# Patient Record
Sex: FEMALE | Race: WHITE | Employment: UNEMPLOYED | URBAN - METROPOLITAN AREA
[De-identification: names, ages, dates, MRNs, and addresses within clinical notes are randomized per-mention and may not be internally consistent; named-entity substitution may affect disease eponyms.]

---

## 2019-03-28 ENCOUNTER — HOSPITAL ENCOUNTER (EMERGENCY)
Age: 29
Discharge: HOME | End: 2019-03-28
Attending: EMERGENCY MEDICINE
Payer: SELF-PAY

## 2019-03-28 ENCOUNTER — APPOINTMENT (OUTPATIENT)
Dept: CT IMAGING | Age: 29
End: 2019-03-28
Attending: NURSE PRACTITIONER
Payer: SELF-PAY

## 2019-03-28 VITALS
HEART RATE: 100 BPM | RESPIRATION RATE: 19 BRPM | WEIGHT: 138 LBS | OXYGEN SATURATION: 97 % | HEIGHT: 59 IN | DIASTOLIC BLOOD PRESSURE: 72 MMHG | TEMPERATURE: 97.2 F | SYSTOLIC BLOOD PRESSURE: 122 MMHG | BODY MASS INDEX: 27.82 KG/M2

## 2019-03-28 DIAGNOSIS — N12 PYELONEPHRITIS: Primary | ICD-10-CM

## 2019-03-28 LAB
ALBUMIN SERPL-MCNC: 3.6 G/DL (ref 3.5–5)
ALBUMIN/GLOB SERPL: 0.8 {RATIO}
ALP SERPL-CCNC: 45 U/L (ref 50–130)
ALT SERPL-CCNC: 27 U/L (ref 12–65)
ANION GAP SERPL CALC-SCNC: 12 MMOL/L
AST SERPL-CCNC: 24 U/L (ref 15–37)
BACTERIA URNS QL MICRO: ABNORMAL /HPF
BASOPHILS # BLD: 0.1 K/UL (ref 0–0.2)
BASOPHILS NFR BLD: 0 % (ref 0–2)
BILIRUB SERPL-MCNC: 0.3 MG/DL (ref 0.2–1.1)
BUN SERPL-MCNC: 15 MG/DL (ref 6–23)
CALCIUM SERPL-MCNC: 9.1 MG/DL (ref 8.3–10.4)
CASTS URNS QL MICRO: ABNORMAL /LPF
CHLORIDE SERPL-SCNC: 102 MMOL/L (ref 98–107)
CO2 SERPL-SCNC: 24 MMOL/L (ref 21–32)
CREAT SERPL-MCNC: 0.72 MG/DL (ref 0.6–1)
DIFFERENTIAL METHOD BLD: ABNORMAL
EOSINOPHIL # BLD: 0.1 K/UL (ref 0–0.8)
EOSINOPHIL NFR BLD: 0 % (ref 0.5–7.8)
EPI CELLS #/AREA URNS HPF: ABNORMAL /HPF
ERYTHROCYTE [DISTWIDTH] IN BLOOD BY AUTOMATED COUNT: 13.6 % (ref 11.9–14.6)
GLOBULIN SER CALC-MCNC: 4.7 G/DL (ref 2.3–3.5)
GLUCOSE SERPL-MCNC: 57 MG/DL (ref 65–100)
HCG UR QL: NEGATIVE
HCT VFR BLD AUTO: 42.3 % (ref 35.8–46.3)
HGB BLD-MCNC: 14 G/DL (ref 11.7–15.4)
IMM GRANULOCYTES # BLD AUTO: 0.1 K/UL (ref 0–0.5)
IMM GRANULOCYTES NFR BLD AUTO: 1 % (ref 0–5)
LACTATE BLD-SCNC: 1.34 MMOL/L (ref 0.5–1.9)
LACTATE BLD-SCNC: 2.18 MMOL/L (ref 0.5–1.9)
LYMPHOCYTES # BLD: 1.6 K/UL (ref 0.5–4.6)
LYMPHOCYTES NFR BLD: 10 % (ref 13–44)
MCH RBC QN AUTO: 30.5 PG (ref 26.1–32.9)
MCHC RBC AUTO-ENTMCNC: 33.1 G/DL (ref 31.4–35)
MCV RBC AUTO: 92.2 FL (ref 79.6–97.8)
MONOCYTES # BLD: 0.7 K/UL (ref 0.1–1.3)
MONOCYTES NFR BLD: 4 % (ref 4–12)
NEUTS SEG # BLD: 14.5 K/UL (ref 1.7–8.2)
NEUTS SEG NFR BLD: 85 % (ref 43–78)
NRBC # BLD: 0 K/UL (ref 0–0.2)
PLATELET # BLD AUTO: 274 K/UL (ref 150–450)
PMV BLD AUTO: 10.4 FL (ref 9.4–12.3)
POTASSIUM SERPL-SCNC: 4.1 MMOL/L (ref 3.5–5.1)
PROT SERPL-MCNC: 8.3 G/DL
RBC # BLD AUTO: 4.59 M/UL (ref 4.05–5.2)
RBC #/AREA URNS HPF: ABNORMAL /HPF
SODIUM SERPL-SCNC: 138 MMOL/L (ref 136–145)
WBC # BLD AUTO: 17 K/UL (ref 4.3–11.1)
WBC URNS QL MICRO: >100 /HPF

## 2019-03-28 PROCEDURE — 96375 TX/PRO/DX INJ NEW DRUG ADDON: CPT | Performed by: NURSE PRACTITIONER

## 2019-03-28 PROCEDURE — 81003 URINALYSIS AUTO W/O SCOPE: CPT | Performed by: NURSE PRACTITIONER

## 2019-03-28 PROCEDURE — 96365 THER/PROPH/DIAG IV INF INIT: CPT | Performed by: NURSE PRACTITIONER

## 2019-03-28 PROCEDURE — 74011000258 HC RX REV CODE- 258: Performed by: NURSE PRACTITIONER

## 2019-03-28 PROCEDURE — 81025 URINE PREGNANCY TEST: CPT

## 2019-03-28 PROCEDURE — 74011250636 HC RX REV CODE- 250/636: Performed by: NURSE PRACTITIONER

## 2019-03-28 PROCEDURE — 99284 EMERGENCY DEPT VISIT MOD MDM: CPT | Performed by: NURSE PRACTITIONER

## 2019-03-28 PROCEDURE — 80053 COMPREHEN METABOLIC PANEL: CPT

## 2019-03-28 PROCEDURE — 87186 SC STD MICRODIL/AGAR DIL: CPT

## 2019-03-28 PROCEDURE — 87086 URINE CULTURE/COLONY COUNT: CPT

## 2019-03-28 PROCEDURE — 81015 MICROSCOPIC EXAM OF URINE: CPT

## 2019-03-28 PROCEDURE — 87088 URINE BACTERIA CULTURE: CPT

## 2019-03-28 PROCEDURE — 74176 CT ABD & PELVIS W/O CONTRAST: CPT

## 2019-03-28 PROCEDURE — 83605 ASSAY OF LACTIC ACID: CPT

## 2019-03-28 PROCEDURE — 85025 COMPLETE CBC W/AUTO DIFF WBC: CPT

## 2019-03-28 RX ORDER — CIPROFLOXACIN 500 MG/1
500 TABLET ORAL 2 TIMES DAILY
Qty: 14 TAB | Refills: 0 | Status: SHIPPED | OUTPATIENT
Start: 2019-03-28 | End: 2019-04-04

## 2019-03-28 RX ORDER — ONDANSETRON 2 MG/ML
4 INJECTION INTRAMUSCULAR; INTRAVENOUS
Status: COMPLETED | OUTPATIENT
Start: 2019-03-28 | End: 2019-03-28

## 2019-03-28 RX ORDER — KETOROLAC TROMETHAMINE 30 MG/ML
30 INJECTION, SOLUTION INTRAMUSCULAR; INTRAVENOUS
Status: COMPLETED | OUTPATIENT
Start: 2019-03-28 | End: 2019-03-28

## 2019-03-28 RX ORDER — SODIUM CHLORIDE 9 MG/ML
1000 INJECTION, SOLUTION INTRAVENOUS ONCE
Status: COMPLETED | OUTPATIENT
Start: 2019-03-28 | End: 2019-03-28

## 2019-03-28 RX ADMIN — CEFTRIAXONE 1 G: 1 INJECTION, POWDER, FOR SOLUTION INTRAMUSCULAR; INTRAVENOUS at 14:05

## 2019-03-28 RX ADMIN — ONDANSETRON 4 MG: 2 INJECTION INTRAMUSCULAR; INTRAVENOUS at 13:31

## 2019-03-28 RX ADMIN — KETOROLAC TROMETHAMINE 30 MG: 30 INJECTION, SOLUTION INTRAMUSCULAR; INTRAVENOUS at 13:32

## 2019-03-28 RX ADMIN — SODIUM CHLORIDE 1000 ML: 900 INJECTION, SOLUTION INTRAVENOUS at 13:31

## 2019-03-28 NOTE — ED NOTES
I have reviewed discharge instructions with the patient. The patient verbalized understanding. Patient left ED via Discharge Method: ambulatory to Home with spouse. Opportunity for questions and clarification provided. Patient given 1 scripts. To continue your aftercare when you leave the hospital, you may receive an automated call from our care team to check in on how you are doing. This is a free service and part of our promise to provide the best care and service to meet your aftercare needs.  If you have questions, or wish to unsubscribe from this service please call [phone]. Thank you for Choosing our [facility].

## 2019-03-28 NOTE — ED TRIAGE NOTES
Pt in states lower right side back pain x 5 days. States fever chills nausea and vomiting. States dysuria.

## 2019-03-28 NOTE — DISCHARGE INSTRUCTIONS
Cipro as prescribed. Increase your water intake. Return to the Emergency Department if symptoms fail to improve or get worse.

## 2019-03-28 NOTE — LETTER
[facility] EMERGENCY DEPT 
[address] [place] [id number] [phone] Work/School Note Date: 3/28/2019 To Whom It May concern: Sharla Gonzalez was seen and treated today in the emergency room by the following provider(s): 
Attending Provider: Amanda Haney MD 
Nurse Practitioner: ABBEY Montelongo. Sharla Gonzalez needs to be excused from work 03/28/2019.  
 
Sincerely, 
 
 
 
 
ABBEY Feldman

## 2019-03-28 NOTE — ED PROVIDER NOTES
Patient presents with right sided lower back pain, fever, dysuria, nausea, and vomiting for the past 5 days. Patient is taking over the counter Azo without relief. She states she took ibuprofen approx 4 hours prior to ED arrival.  
 
The history is provided by the patient. Urinary Pain This is a new problem. The current episode started more than 2 days ago. The problem occurs every urination. The problem has been gradually worsening. The quality of the pain is described as burning. The pain is at a severity of 6/10. The pain is moderate. There has been a fever of 101 - 101.9 F. The fever has been present for 1 - 2 days. She is sexually active. There is a history of pyelonephritis. Associated symptoms include chills, sweats, nausea, vomiting, flank pain and back pain. Pertinent negatives include no discharge, no frequency, no hematuria, no hesitancy, no urgency, no vaginal discharge, no penile discharge and no abdominal pain. The patient is not pregnant. Treatments tried: Azo. The treatment provided no relief. Her past medical history is significant for recurrent UTIs. Past Medical History:  
Diagnosis Date  Asthma  Chronic back pain  Chronic lower back pain  Psychiatric disorder   
 bipolar  Seizures ([facility] [address] Past Surgical History:  
Procedure Laterality Date  HX GYN    
 2 c-sec, tubal  
 
   
History reviewed. No pertinent family history. Social History Socioeconomic History  Marital status: SINGLE Spouse name: Not on file  Number of children: Not on file  Years of education: Not on file  Highest education level: Not on file Occupational History  Not on file Social Needs  Financial resource strain: Not on file  Food insecurity:  
  Worry: Not on file Inability: Not on file  Transportation needs:  
  Medical: Not on file Non-medical: Not on file Tobacco Use  Smoking status: Current Every Day Smoker   Packs/day: 1.00  
 Years: 1.00 Pack years: 1.00  Smokeless tobacco: Never Used Substance and Sexual Activity  Alcohol use: Yes Comment: occassionally  Drug use: No  
 Sexual activity: Not on file Lifestyle  Physical activity:  
  Days per week: Not on file Minutes per session: Not on file  Stress: Not on file Relationships  Social connections:  
  Talks on phone: Not on file Gets together: Not on file Attends Nondenominational service: Not on file Active member of club or organization: Not on file Attends meetings of clubs or organizations: Not on file Relationship status: Not on file  Intimate partner violence:  
  Fear of current or ex partner: Not on file Emotionally abused: Not on file Physically abused: Not on file Forced sexual activity: Not on file Other Topics Concern  Not on file Social History Narrative  Not on file ALLERGIES: Dilantin [phenytoin sodium extended] and Keppra [levetiracetam] Review of Systems Constitutional: Positive for chills and fever. Gastrointestinal: Positive for nausea and vomiting. Negative for abdominal pain. Genitourinary: Positive for flank pain. Negative for frequency, hematuria, hesitancy, penile discharge, urgency and vaginal discharge. Musculoskeletal: Positive for back pain. Vitals:  
 03/28/19 1200 BP: 128/73 Pulse: (!) 120 Resp: 22 Temp: 97.2 °F (36.2 °C) SpO2: 97% Weight: 62.6 kg (138 lb) Height: 4' 11\" (1.499 m) Physical Exam  
Constitutional: She is oriented to person, place, and time. She appears ill. No distress. HENT:  
Head: Normocephalic and atraumatic. Eyes: Conjunctivae and EOM are normal.  
Neck: Normal range of motion. Neck supple. Cardiovascular: Tachycardia present. Pulmonary/Chest: Effort normal and breath sounds normal.  
Abdominal: Soft. She exhibits no distension.  There is tenderness in the right lower quadrant, suprapubic area and left lower quadrant. Musculoskeletal:  
     Lumbar back: She exhibits tenderness and pain. Back: 
 
Neurological: She is alert and oriented to person, place, and time. Skin: Skin is warm and dry. She is not diaphoretic. Psychiatric: She has a normal mood and affect. Her behavior is normal.  
Nursing note and vitals reviewed. 12:48 PM-discussed patient with Dr. Naren Del Rosario. MDM Number of Diagnoses or Management Options Pyelonephritis:  
Diagnosis management comments: UA noted to be infected. Urine sent for culture. Patient noted to have elevated WBC and initial lactic acid. Lactic acid improved after IV fluids. Patient was given IV rocephin while in the department. CT was negative for kidney stone. Patient states symptoms have improved with treatment and she would like to be discharged home. Prescription for cipro given. Strict return precautions given. Discussed patient with Dr. Naren Del Rosario. Amount and/or Complexity of Data Reviewed Clinical lab tests: reviewed and ordered Tests in the radiology section of CPT®: ordered and reviewed Tests in the medicine section of CPT®: ordered and reviewed Discuss the patient with other providers: yes (Naren Del Rosario ) Risk of Complications, Morbidity, and/or Mortality Presenting problems: moderate Diagnostic procedures: moderate Management options: low Patient Progress Patient progress: stable ED Course as of Mar 28 1633 Thu Mar 28, 2019  
1624 In to discuss results and management with patient. She is sitting on the stretcher eating [facility]. She states she feels a lot better. Discussed admission with her for continued management. She states she would like to try out patient management first. I feel this is appropriate at this time. We discussed return precautions. She voiced understanding. Plan of care discussed with Dr. Naren Del Rosario. [JM] ED Course User Index [JM] ABBEY Alcantar  
 I discussed the results of all labs, procedures, radiographs, and treatments with the patient and available family. Treatment plan is agreed upon and the patient is ready for discharge. Questions about treatment in the ED and differential diagnosis of presenting condition were answered. Patient was given verbal discharge instructions including, but not limited to, importance of returning to the emergency department for any concern of worsening or continued symptoms. Instructions were given to follow up with a primary care provider or specialist within 1-2 days. Adverse effects of medications, if prescribed, were discussed and patient was advised to refrain from significant physical activity until followed up by primary care physician and to not drive or operate heavy machinery after taking any sedating substances. Procedures

## 2019-03-30 LAB
BACTERIA SPEC CULT: ABNORMAL
SERVICE CMNT-IMP: ABNORMAL

## 2019-04-06 ENCOUNTER — HOSPITAL ENCOUNTER (INPATIENT)
Age: 29
LOS: 18 days | Discharge: LEFT BEFORE DISCHARGE | DRG: 871 | End: 2019-04-24
Attending: EMERGENCY MEDICINE | Admitting: INTERNAL MEDICINE
Payer: SELF-PAY

## 2019-04-06 ENCOUNTER — APPOINTMENT (OUTPATIENT)
Dept: ULTRASOUND IMAGING | Age: 29
DRG: 871 | End: 2019-04-06
Attending: INTERNAL MEDICINE
Payer: SELF-PAY

## 2019-04-06 ENCOUNTER — APPOINTMENT (OUTPATIENT)
Dept: CT IMAGING | Age: 29
DRG: 871 | End: 2019-04-06
Attending: EMERGENCY MEDICINE
Payer: SELF-PAY

## 2019-04-06 ENCOUNTER — APPOINTMENT (OUTPATIENT)
Dept: GENERAL RADIOLOGY | Age: 29
DRG: 871 | End: 2019-04-06
Attending: EMERGENCY MEDICINE
Payer: SELF-PAY

## 2019-04-06 DIAGNOSIS — A41.9 SEPSIS, DUE TO UNSPECIFIED ORGANISM: Primary | ICD-10-CM

## 2019-04-06 PROBLEM — E87.1 HYPONATREMIA: Status: ACTIVE | Noted: 2019-04-06

## 2019-04-06 PROBLEM — I10 HYPERTENSION: Status: ACTIVE | Noted: 2019-04-06

## 2019-04-06 PROBLEM — F19.90 SUBSTANCE USE DISORDER: Chronic | Status: ACTIVE | Noted: 2019-04-06

## 2019-04-06 PROBLEM — N17.9 AKI (ACUTE KIDNEY INJURY) (HCC): Status: ACTIVE | Noted: 2019-04-06

## 2019-04-06 PROBLEM — K75.9 HEPATITIS: Status: ACTIVE | Noted: 2019-04-06

## 2019-04-06 PROBLEM — M25.559 HIP PAIN: Status: ACTIVE | Noted: 2019-04-06

## 2019-04-06 PROBLEM — N39.0 UTI (URINARY TRACT INFECTION): Status: ACTIVE | Noted: 2019-04-06

## 2019-04-06 LAB
ALBUMIN SERPL-MCNC: 3.1 G/DL (ref 3.5–5)
ALBUMIN/GLOB SERPL: 0.7 {RATIO} (ref 1.2–3.5)
ALP SERPL-CCNC: 48 U/L (ref 50–136)
ALT SERPL-CCNC: 672 U/L (ref 12–65)
AMORPH CRY URNS QL MICRO: ABNORMAL
AMPHET UR QL SCN: POSITIVE
ANION GAP SERPL CALC-SCNC: 11 MMOL/L (ref 7–16)
APPEARANCE UR: ABNORMAL
AST SERPL-CCNC: 3195 U/L (ref 15–37)
BACTERIA URNS QL MICRO: ABNORMAL /HPF
BARBITURATES UR QL SCN: NEGATIVE
BASOPHILS # BLD: 0.1 K/UL (ref 0–0.2)
BASOPHILS NFR BLD: 0 % (ref 0–2)
BENZODIAZ UR QL: POSITIVE
BILIRUB SERPL-MCNC: 0.7 MG/DL (ref 0.2–1.1)
BILIRUB UR QL: ABNORMAL
BUN SERPL-MCNC: 22 MG/DL (ref 6–23)
CALCIUM SERPL-MCNC: 7.9 MG/DL (ref 8.3–10.4)
CANNABINOIDS UR QL SCN: NEGATIVE
CASTS URNS QL MICRO: 0 /LPF
CHLORIDE SERPL-SCNC: 103 MMOL/L (ref 98–107)
CO2 SERPL-SCNC: 17 MMOL/L (ref 21–32)
COCAINE UR QL SCN: NEGATIVE
COLOR UR: ABNORMAL
CREAT SERPL-MCNC: 1.78 MG/DL (ref 0.6–1)
CRYSTALS URNS QL MICRO: 0 /LPF
DIFFERENTIAL METHOD BLD: ABNORMAL
EOSINOPHIL # BLD: 0 K/UL (ref 0–0.8)
EOSINOPHIL NFR BLD: 0 % (ref 0.5–7.8)
EPI CELLS #/AREA URNS HPF: 0 /HPF
ERYTHROCYTE [DISTWIDTH] IN BLOOD BY AUTOMATED COUNT: 13.7 % (ref 11.9–14.6)
ETHANOL SERPL-MCNC: <3 MG/DL
GLOBULIN SER CALC-MCNC: 4.2 G/DL (ref 2.3–3.5)
GLUCOSE SERPL-MCNC: 194 MG/DL (ref 65–100)
GLUCOSE UR STRIP.AUTO-MCNC: 250 MG/DL
HCG UR QL: NEGATIVE
HCT VFR BLD AUTO: 52.2 % (ref 35.8–46.3)
HGB BLD-MCNC: 17.3 G/DL (ref 11.7–15.4)
HGB UR QL STRIP: ABNORMAL
IMM GRANULOCYTES # BLD AUTO: 0.2 K/UL (ref 0–0.5)
IMM GRANULOCYTES NFR BLD AUTO: 1 % (ref 0–5)
KETONES UR QL STRIP.AUTO: 15 MG/DL
LACTATE SERPL-SCNC: 1.5 MMOL/L (ref 0.4–2)
LEUKOCYTE ESTERASE UR QL STRIP.AUTO: ABNORMAL
LYMPHOCYTES # BLD: 1.4 K/UL (ref 0.5–4.6)
LYMPHOCYTES NFR BLD: 6 % (ref 13–44)
MCH RBC QN AUTO: 30.1 PG (ref 26.1–32.9)
MCHC RBC AUTO-ENTMCNC: 33.1 G/DL (ref 31.4–35)
MCV RBC AUTO: 90.8 FL (ref 79.6–97.8)
METHADONE UR QL: NEGATIVE
MONOCYTES # BLD: 1.2 K/UL (ref 0.1–1.3)
MONOCYTES NFR BLD: 5 % (ref 4–12)
MUCOUS THREADS URNS QL MICRO: ABNORMAL /LPF
NEUTS SEG # BLD: 21 K/UL (ref 1.7–8.2)
NEUTS SEG NFR BLD: 88 % (ref 43–78)
NITRITE UR QL STRIP.AUTO: POSITIVE
NRBC # BLD: 0 K/UL (ref 0–0.2)
OPIATES UR QL: POSITIVE
OTHER OBSERVATIONS,UCOM: ABNORMAL
PCP UR QL: NEGATIVE
PH UR STRIP: 5.5 [PH] (ref 5–9)
PLATELET # BLD AUTO: 347 K/UL (ref 150–450)
PMV BLD AUTO: 9.9 FL (ref 9.4–12.3)
POTASSIUM SERPL-SCNC: 4.4 MMOL/L (ref 3.5–5.1)
PROT SERPL-MCNC: 7.3 G/DL (ref 6.3–8.2)
PROT UR STRIP-MCNC: 100 MG/DL
RBC # BLD AUTO: 5.75 M/UL (ref 4.05–5.2)
RBC #/AREA URNS HPF: ABNORMAL /HPF
SODIUM SERPL-SCNC: 131 MMOL/L (ref 136–145)
SP GR UR REFRACTOMETRY: 1.03 (ref 1–1.02)
UROBILINOGEN UR QL STRIP.AUTO: 1 EU/DL (ref 0.2–1)
WBC # BLD AUTO: 23.8 K/UL (ref 4.3–11.1)
WBC URNS QL MICRO: 0 /HPF

## 2019-04-06 PROCEDURE — 96374 THER/PROPH/DIAG INJ IV PUSH: CPT | Performed by: EMERGENCY MEDICINE

## 2019-04-06 PROCEDURE — 81015 MICROSCOPIC EXAM OF URINE: CPT

## 2019-04-06 PROCEDURE — 80307 DRUG TEST PRSMV CHEM ANLYZR: CPT

## 2019-04-06 PROCEDURE — 76700 US EXAM ABDOM COMPLETE: CPT

## 2019-04-06 PROCEDURE — 96375 TX/PRO/DX INJ NEW DRUG ADDON: CPT | Performed by: EMERGENCY MEDICINE

## 2019-04-06 PROCEDURE — 74011250636 HC RX REV CODE- 250/636: Performed by: INTERNAL MEDICINE

## 2019-04-06 PROCEDURE — 77030027138 HC INCENT SPIROMETER -A

## 2019-04-06 PROCEDURE — 87086 URINE CULTURE/COLONY COUNT: CPT

## 2019-04-06 PROCEDURE — 70450 CT HEAD/BRAIN W/O DYE: CPT

## 2019-04-06 PROCEDURE — 71046 X-RAY EXAM CHEST 2 VIEWS: CPT

## 2019-04-06 PROCEDURE — 81025 URINE PREGNANCY TEST: CPT

## 2019-04-06 PROCEDURE — 87040 BLOOD CULTURE FOR BACTERIA: CPT

## 2019-04-06 PROCEDURE — 74011000258 HC RX REV CODE- 258: Performed by: INTERNAL MEDICINE

## 2019-04-06 PROCEDURE — 99285 EMERGENCY DEPT VISIT HI MDM: CPT | Performed by: EMERGENCY MEDICINE

## 2019-04-06 PROCEDURE — 65270000029 HC RM PRIVATE

## 2019-04-06 PROCEDURE — 83605 ASSAY OF LACTIC ACID: CPT

## 2019-04-06 PROCEDURE — 81001 URINALYSIS AUTO W/SCOPE: CPT

## 2019-04-06 PROCEDURE — 82550 ASSAY OF CK (CPK): CPT

## 2019-04-06 PROCEDURE — 74011000258 HC RX REV CODE- 258: Performed by: EMERGENCY MEDICINE

## 2019-04-06 PROCEDURE — 65660000000 HC RM CCU STEPDOWN

## 2019-04-06 PROCEDURE — 36415 COLL VENOUS BLD VENIPUNCTURE: CPT

## 2019-04-06 PROCEDURE — 80053 COMPREHEN METABOLIC PANEL: CPT

## 2019-04-06 PROCEDURE — 93005 ELECTROCARDIOGRAM TRACING: CPT | Performed by: EMERGENCY MEDICINE

## 2019-04-06 PROCEDURE — 74011250636 HC RX REV CODE- 250/636: Performed by: EMERGENCY MEDICINE

## 2019-04-06 PROCEDURE — 85025 COMPLETE CBC W/AUTO DIFF WBC: CPT

## 2019-04-06 RX ORDER — LORAZEPAM 2 MG/ML
1 INJECTION INTRAMUSCULAR
Status: DISCONTINUED | OUTPATIENT
Start: 2019-04-06 | End: 2019-04-24 | Stop reason: HOSPADM

## 2019-04-06 RX ORDER — MORPHINE SULFATE 2 MG/ML
4 INJECTION, SOLUTION INTRAMUSCULAR; INTRAVENOUS ONCE
Status: COMPLETED | OUTPATIENT
Start: 2019-04-06 | End: 2019-04-06

## 2019-04-06 RX ORDER — ONDANSETRON 2 MG/ML
4 INJECTION INTRAMUSCULAR; INTRAVENOUS
Status: DISCONTINUED | OUTPATIENT
Start: 2019-04-06 | End: 2019-04-24 | Stop reason: HOSPADM

## 2019-04-06 RX ORDER — VANCOMYCIN/0.9 % SOD CHLORIDE 1.5G/250ML
1500 PLASTIC BAG, INJECTION (ML) INTRAVENOUS ONCE
Status: COMPLETED | OUTPATIENT
Start: 2019-04-06 | End: 2019-04-07

## 2019-04-06 RX ORDER — SODIUM CHLORIDE 9 MG/ML
75 INJECTION, SOLUTION INTRAVENOUS CONTINUOUS
Status: DISCONTINUED | OUTPATIENT
Start: 2019-04-06 | End: 2019-04-11

## 2019-04-06 RX ORDER — SODIUM CHLORIDE 0.9 % (FLUSH) 0.9 %
5-10 SYRINGE (ML) INJECTION AS NEEDED
Status: DISCONTINUED | OUTPATIENT
Start: 2019-04-06 | End: 2019-04-24 | Stop reason: HOSPADM

## 2019-04-06 RX ORDER — MORPHINE SULFATE 2 MG/ML
2 INJECTION, SOLUTION INTRAMUSCULAR; INTRAVENOUS
Status: DISCONTINUED | OUTPATIENT
Start: 2019-04-06 | End: 2019-04-15

## 2019-04-06 RX ORDER — IBUPROFEN 200 MG
1 TABLET ORAL DAILY
Status: DISCONTINUED | OUTPATIENT
Start: 2019-04-06 | End: 2019-04-22

## 2019-04-06 RX ORDER — NALOXONE HYDROCHLORIDE 0.4 MG/ML
0.4 INJECTION, SOLUTION INTRAMUSCULAR; INTRAVENOUS; SUBCUTANEOUS
Status: DISCONTINUED | OUTPATIENT
Start: 2019-04-06 | End: 2019-04-24 | Stop reason: HOSPADM

## 2019-04-06 RX ADMIN — PIPERACILLIN AND TAZOBACTAM 3.38 G: 3; .375 INJECTION, POWDER, FOR SOLUTION INTRAVENOUS at 22:31

## 2019-04-06 RX ADMIN — SODIUM CHLORIDE 1000 ML: 900 INJECTION, SOLUTION INTRAVENOUS at 18:37

## 2019-04-06 RX ADMIN — SODIUM CHLORIDE 1000 ML: 900 INJECTION, SOLUTION INTRAVENOUS at 22:20

## 2019-04-06 RX ADMIN — MORPHINE SULFATE 4 MG: 2 INJECTION, SOLUTION INTRAMUSCULAR; INTRAVENOUS at 20:47

## 2019-04-06 RX ADMIN — CEFTRIAXONE SODIUM 1 G: 1 INJECTION, POWDER, FOR SOLUTION INTRAMUSCULAR; INTRAVENOUS at 20:49

## 2019-04-06 NOTE — ED NOTES
Pt arrives via EMS with complaint of pain all over after drinking ETOH and smoking meth. Requesting pain medicine.

## 2019-04-06 NOTE — ED PROVIDER NOTES
Patient is a 42-year-old female who arrives in the emergency department via EMS. On arrival she complained of pain and kept asking for pain medication but would not provide much information. She does admit to alcohol and methamphetamine abuse. When I enter the room she is sleeping soundly. She then complains of pain in both of her legs. She then asked for something to drink. She does not know how she got here she does not want not know what happened. She thinks she might have passed out. The history is provided by the patient. Altered mental status Pertinent negatives include no seizures and no weakness. Mental status baseline is normal.  Her past medical history does not include head trauma. Past Medical History:  
Diagnosis Date  Asthma  Chronic back pain  Chronic lower back pain  Psychiatric disorder   
 bipolar  Seizures ([facility] [address] Past Surgical History:  
Procedure Laterality Date  HX GYN    
 2 c-sec, tubal  
 
   
History reviewed. No pertinent family history. Social History Socioeconomic History  Marital status: SINGLE Spouse name: Not on file  Number of children: Not on file  Years of education: Not on file  Highest education level: Not on file Occupational History  Not on file Social Needs  Financial resource strain: Not on file  Food insecurity:  
  Worry: Not on file Inability: Not on file  Transportation needs:  
  Medical: Not on file Non-medical: Not on file Tobacco Use  Smoking status: Current Every Day Smoker Packs/day: 1.00 Years: 1.00 Pack years: 1.00  Smokeless tobacco: Never Used Substance and Sexual Activity  Alcohol use: Yes Comment: occassionally  Drug use: No  
 Sexual activity: Not on file Lifestyle  Physical activity:  
  Days per week: Not on file Minutes per session: Not on file  Stress: Not on file Relationships  Social connections: Talks on phone: Not on file Gets together: Not on file Attends Gnosticist service: Not on file Active member of club or organization: Not on file Attends meetings of clubs or organizations: Not on file Relationship status: Not on file  Intimate partner violence:  
  Fear of current or ex partner: Not on file Emotionally abused: Not on file Physically abused: Not on file Forced sexual activity: Not on file Other Topics Concern  Not on file Social History Narrative  Not on file ALLERGIES: Dilantin [phenytoin sodium extended] and Keppra [levetiracetam] Review of Systems Constitutional: Negative for chills, fatigue and fever. HENT: Negative for congestion, rhinorrhea and sore throat. Eyes: Negative for pain, discharge and visual disturbance. Respiratory: Negative for cough and shortness of breath. Cardiovascular: Negative for chest pain and palpitations. Gastrointestinal: Negative for abdominal pain, diarrhea and nausea. Endocrine: Negative for polydipsia and polyuria. Genitourinary: Negative for dysuria, frequency and urgency. Musculoskeletal: Negative for back pain and neck pain. Skin: Negative for rash. Neurological: Negative for seizures, syncope and weakness. Hematological: Negative. Vitals:  
 04/06/19 1601 04/06/19 1753 BP: 124/90 (!) 134/105 Pulse: (!) 134 Resp: 18 Temp: 97.1 °F (36.2 °C) SpO2: 99% 92% Weight: 56.7 kg (125 lb) Height: 4' 11\" (1.499 m) Physical Exam  
Constitutional: She is oriented to person, place, and time. She appears well-developed and well-nourished. disheveled HENT:  
Head: Normocephalic and atraumatic. Dry mucous membranes Eyes: Pupils are equal, round, and reactive to light. Conjunctivae and EOM are normal.  
Neck: Normal range of motion. Neck supple. No midline cervical spine tenderness Cardiovascular: Regular rhythm and intact distal pulses.  Tachycardia present. Pulmonary/Chest: Effort normal and breath sounds normal.  
Abdominal: Soft. There is no tenderness. There is no rebound and no guarding. Musculoskeletal: Normal range of motion. She exhibits no edema, tenderness or deformity. Lymphadenopathy:  
  She has no cervical adenopathy. Neurological: She is alert and oriented to person, place, and time. She has normal strength. No cranial nerve deficit or sensory deficit. GCS eye subscore is 4. GCS verbal subscore is 5. GCS motor subscore is 6. Skin: Skin is warm and dry. Capillary refill takes less than 2 seconds. No rash noted. Psychiatric: She expresses no homicidal and no suicidal ideation. Bizarre affect with flight of ideas. Nursing note and vitals reviewed. MDM Number of Diagnoses or Management Options Diagnosis management comments: EKG reviewed by me shows sinus tachycardia rate 134. No acute ischemia. 8:31 PM 
Chest x-ray is negative CAT scan head normal 
White blood cell count markedly elevated at 23,000 Acute kidney injury with a creatinine of 1.78 These laboratory values are very different from her visit 3 days ago when she was treated for UTI I've ordered blood cultures ×2 and IV Rocephin Blood alcohol negative Urine drug screen is positive for multiple substances I will consult with the hospitalist service for admission and further management. Voice dictation software was used during the making of this note. This software is not perfect and grammatical and other typographical errors may be present. This note has been proofread, but may still contain errors. Elva Cerrato MD; 4/6/2019 @8:57 PM  
=================================================================== Amount and/or Complexity of Data Reviewed Clinical lab tests: ordered and reviewed Tests in the radiology section of CPT®: ordered and reviewed Review and summarize past medical records: yes Discuss the patient with other providers: yes Independent visualization of images, tracings, or specimens: yes Risk of Complications, Morbidity, and/or Mortality Presenting problems: moderate Diagnostic procedures: moderate Management options: low Patient Progress Patient progress: stable Procedures

## 2019-04-07 ENCOUNTER — APPOINTMENT (OUTPATIENT)
Dept: ULTRASOUND IMAGING | Age: 29
DRG: 871 | End: 2019-04-07
Attending: PHYSICIAN ASSISTANT
Payer: SELF-PAY

## 2019-04-07 ENCOUNTER — APPOINTMENT (OUTPATIENT)
Dept: MRI IMAGING | Age: 29
DRG: 871 | End: 2019-04-07
Attending: INTERNAL MEDICINE
Payer: SELF-PAY

## 2019-04-07 ENCOUNTER — APPOINTMENT (OUTPATIENT)
Dept: CT IMAGING | Age: 29
DRG: 871 | End: 2019-04-07
Attending: INTERNAL MEDICINE
Payer: SELF-PAY

## 2019-04-07 ENCOUNTER — APPOINTMENT (OUTPATIENT)
Dept: GENERAL RADIOLOGY | Age: 29
DRG: 871 | End: 2019-04-07
Attending: INTERNAL MEDICINE
Payer: SELF-PAY

## 2019-04-07 ENCOUNTER — APPOINTMENT (OUTPATIENT)
Dept: ULTRASOUND IMAGING | Age: 29
DRG: 871 | End: 2019-04-07
Attending: INTERNAL MEDICINE
Payer: SELF-PAY

## 2019-04-07 PROBLEM — M62.82 RHABDOMYOLYSIS: Status: ACTIVE | Noted: 2019-04-07

## 2019-04-07 PROBLEM — K59.00 CONSTIPATION: Chronic | Status: ACTIVE | Noted: 2019-04-07

## 2019-04-07 PROBLEM — K59.00 CONSTIPATION: Status: ACTIVE | Noted: 2019-04-07

## 2019-04-07 LAB
ALBUMIN SERPL-MCNC: 2.3 G/DL (ref 3.5–5)
ALBUMIN/GLOB SERPL: 0.6 {RATIO} (ref 1.2–3.5)
ALP SERPL-CCNC: 39 U/L (ref 50–136)
ALT SERPL-CCNC: 491 U/L (ref 12–65)
ANION GAP SERPL CALC-SCNC: 11 MMOL/L (ref 7–16)
APAP SERPL-MCNC: 3 UG/ML (ref 10–30)
AST SERPL-CCNC: 1155 U/L (ref 15–37)
ATRIAL RATE: 134 BPM
BASOPHILS # BLD: 0 K/UL (ref 0–0.2)
BASOPHILS NFR BLD: 0 % (ref 0–2)
BILIRUB SERPL-MCNC: 0.5 MG/DL (ref 0.2–1.1)
BUN SERPL-MCNC: 39 MG/DL (ref 6–23)
CALCIUM SERPL-MCNC: 7.4 MG/DL (ref 8.3–10.4)
CALCULATED P AXIS, ECG09: 81 DEGREES
CALCULATED R AXIS, ECG10: 78 DEGREES
CALCULATED T AXIS, ECG11: 62 DEGREES
CHLORIDE SERPL-SCNC: 105 MMOL/L (ref 98–107)
CK SERPL-CCNC: ABNORMAL U/L (ref 21–215)
CO2 SERPL-SCNC: 18 MMOL/L (ref 21–32)
CREAT SERPL-MCNC: 4.13 MG/DL (ref 0.6–1)
DIAGNOSIS, 93000: NORMAL
DIFFERENTIAL METHOD BLD: ABNORMAL
EOSINOPHIL # BLD: 0 K/UL (ref 0–0.8)
EOSINOPHIL NFR BLD: 0 % (ref 0.5–7.8)
ERYTHROCYTE [DISTWIDTH] IN BLOOD BY AUTOMATED COUNT: 14 % (ref 11.9–14.6)
GLOBULIN SER CALC-MCNC: 3.8 G/DL (ref 2.3–3.5)
GLUCOSE SERPL-MCNC: 82 MG/DL (ref 65–100)
HCT VFR BLD AUTO: 51.4 % (ref 35.8–46.3)
HGB BLD-MCNC: 17.1 G/DL (ref 11.7–15.4)
HIV1 P24 AG SERPL QL IA: NONREACTIVE
HIV1+2 AB SERPL QL IA: NONREACTIVE
IMM GRANULOCYTES # BLD AUTO: 0.2 K/UL (ref 0–0.5)
IMM GRANULOCYTES NFR BLD AUTO: 1 % (ref 0–5)
LYMPHOCYTES # BLD: 2.8 K/UL (ref 0.5–4.6)
LYMPHOCYTES NFR BLD: 13 % (ref 13–44)
MCH RBC QN AUTO: 30.6 PG (ref 26.1–32.9)
MCHC RBC AUTO-ENTMCNC: 33.3 G/DL (ref 31.4–35)
MCV RBC AUTO: 91.9 FL (ref 79.6–97.8)
MONOCYTES # BLD: 1.1 K/UL (ref 0.1–1.3)
MONOCYTES NFR BLD: 5 % (ref 4–12)
NEUTS SEG # BLD: 17.1 K/UL (ref 1.7–8.2)
NEUTS SEG NFR BLD: 81 % (ref 43–78)
NRBC # BLD: 0 K/UL (ref 0–0.2)
P-R INTERVAL, ECG05: 122 MS
PLATELET # BLD AUTO: 203 K/UL (ref 150–450)
PMV BLD AUTO: 10.8 FL (ref 9.4–12.3)
POTASSIUM SERPL-SCNC: 4.7 MMOL/L (ref 3.5–5.1)
PROT SERPL-MCNC: 6.1 G/DL (ref 6.3–8.2)
Q-T INTERVAL, ECG07: 282 MS
QRS DURATION, ECG06: 68 MS
QTC CALCULATION (BEZET), ECG08: 421 MS
RBC # BLD AUTO: 5.59 M/UL (ref 4.05–5.2)
SODIUM SERPL-SCNC: 134 MMOL/L (ref 136–145)
VENTRICULAR RATE, ECG03: 134 BPM
WBC # BLD AUTO: 21.2 K/UL (ref 4.3–11.1)

## 2019-04-07 PROCEDURE — 65660000000 HC RM CCU STEPDOWN

## 2019-04-07 PROCEDURE — 65270000029 HC RM PRIVATE

## 2019-04-07 PROCEDURE — 80053 COMPREHEN METABOLIC PANEL: CPT

## 2019-04-07 PROCEDURE — 74011250636 HC RX REV CODE- 250/636: Performed by: INTERNAL MEDICINE

## 2019-04-07 PROCEDURE — 77030034849

## 2019-04-07 PROCEDURE — 93970 EXTREMITY STUDY: CPT

## 2019-04-07 PROCEDURE — 80307 DRUG TEST PRSMV CHEM ANLYZR: CPT

## 2019-04-07 PROCEDURE — 73502 X-RAY EXAM HIP UNI 2-3 VIEWS: CPT

## 2019-04-07 PROCEDURE — 93922 UPR/L XTREMITY ART 2 LEVELS: CPT

## 2019-04-07 PROCEDURE — 74011000258 HC RX REV CODE- 258: Performed by: INTERNAL MEDICINE

## 2019-04-07 PROCEDURE — 85025 COMPLETE CBC W/AUTO DIFF WBC: CPT

## 2019-04-07 PROCEDURE — 82550 ASSAY OF CK (CPK): CPT

## 2019-04-07 PROCEDURE — 36415 COLL VENOUS BLD VENIPUNCTURE: CPT

## 2019-04-07 PROCEDURE — 74176 CT ABD & PELVIS W/O CONTRAST: CPT

## 2019-04-07 PROCEDURE — 74011636320 HC RX REV CODE- 636/320: Performed by: INTERNAL MEDICINE

## 2019-04-07 PROCEDURE — 87040 BLOOD CULTURE FOR BACTERIA: CPT

## 2019-04-07 PROCEDURE — 77030020263 HC SOL INJ SOD CL0.9% LFCR 1000ML

## 2019-04-07 PROCEDURE — 74011250637 HC RX REV CODE- 250/637: Performed by: INTERNAL MEDICINE

## 2019-04-07 PROCEDURE — 51798 US URINE CAPACITY MEASURE: CPT

## 2019-04-07 PROCEDURE — 80074 ACUTE HEPATITIS PANEL: CPT

## 2019-04-07 PROCEDURE — 72148 MRI LUMBAR SPINE W/O DYE: CPT

## 2019-04-07 PROCEDURE — BT40ZZZ ULTRASONOGRAPHY OF BLADDER: ICD-10-PCS | Performed by: INTERNAL MEDICINE

## 2019-04-07 PROCEDURE — 74011250636 HC RX REV CODE- 250/636: Performed by: PHYSICIAN ASSISTANT

## 2019-04-07 PROCEDURE — 87389 HIV-1 AG W/HIV-1&-2 AB AG IA: CPT

## 2019-04-07 PROCEDURE — 0T9B70Z DRAINAGE OF BLADDER WITH DRAINAGE DEVICE, VIA NATURAL OR ARTIFICIAL OPENING: ICD-10-PCS | Performed by: INTERNAL MEDICINE

## 2019-04-07 RX ORDER — HEPARIN SODIUM 5000 [USP'U]/ML
5000 INJECTION, SOLUTION INTRAVENOUS; SUBCUTANEOUS EVERY 8 HOURS
Status: DISCONTINUED | OUTPATIENT
Start: 2019-04-07 | End: 2019-04-07

## 2019-04-07 RX ORDER — SODIUM CHLORIDE 0.9 % (FLUSH) 0.9 %
10 SYRINGE (ML) INJECTION
Status: ACTIVE | OUTPATIENT
Start: 2019-04-07 | End: 2019-04-08

## 2019-04-07 RX ORDER — HEPARIN SODIUM 5000 [USP'U]/100ML
18-36 INJECTION, SOLUTION INTRAVENOUS
Status: DISCONTINUED | OUTPATIENT
Start: 2019-04-07 | End: 2019-04-16

## 2019-04-07 RX ADMIN — LORAZEPAM 1 MG: 2 INJECTION INTRAMUSCULAR; INTRAVENOUS at 12:56

## 2019-04-07 RX ADMIN — MORPHINE SULFATE 2 MG: 2 INJECTION, SOLUTION INTRAMUSCULAR; INTRAVENOUS at 06:17

## 2019-04-07 RX ADMIN — PIPERACILLIN AND TAZOBACTAM 3.38 G: 3; .375 INJECTION, POWDER, FOR SOLUTION INTRAVENOUS at 16:37

## 2019-04-07 RX ADMIN — DIATRIZOATE MEGLUMINE AND DIATRIZOATE SODIUM 15 ML: 660; 100 LIQUID ORAL; RECTAL at 21:33

## 2019-04-07 RX ADMIN — VANCOMYCIN HYDROCHLORIDE 1500 MG: 10 INJECTION, POWDER, LYOPHILIZED, FOR SOLUTION INTRAVENOUS at 00:10

## 2019-04-07 RX ADMIN — SODIUM CHLORIDE 150 ML/HR: 900 INJECTION, SOLUTION INTRAVENOUS at 16:38

## 2019-04-07 RX ADMIN — MORPHINE SULFATE 2 MG: 2 INJECTION, SOLUTION INTRAMUSCULAR; INTRAVENOUS at 21:44

## 2019-04-07 RX ADMIN — MORPHINE SULFATE 2 MG: 2 INJECTION, SOLUTION INTRAMUSCULAR; INTRAVENOUS at 10:33

## 2019-04-07 RX ADMIN — HEPARIN SODIUM 5000 UNITS: 5000 INJECTION INTRAVENOUS; SUBCUTANEOUS at 16:40

## 2019-04-07 RX ADMIN — HEPARIN SODIUM 5000 UNITS: 5000 INJECTION INTRAVENOUS; SUBCUTANEOUS at 10:23

## 2019-04-07 RX ADMIN — PIPERACILLIN AND TAZOBACTAM 3.38 G: 3; .375 INJECTION, POWDER, FOR SOLUTION INTRAVENOUS at 06:12

## 2019-04-07 RX ADMIN — MORPHINE SULFATE 2 MG: 2 INJECTION, SOLUTION INTRAMUSCULAR; INTRAVENOUS at 15:28

## 2019-04-07 RX ADMIN — LORAZEPAM 1 MG: 2 INJECTION INTRAMUSCULAR; INTRAVENOUS at 01:10

## 2019-04-07 RX ADMIN — MORPHINE SULFATE 2 MG: 2 INJECTION, SOLUTION INTRAMUSCULAR; INTRAVENOUS at 00:09

## 2019-04-07 RX ADMIN — SODIUM CHLORIDE 150 ML/HR: 900 INJECTION, SOLUTION INTRAVENOUS at 00:09

## 2019-04-07 RX ADMIN — SODIUM CHLORIDE 1000 ML: 900 INJECTION, SOLUTION INTRAVENOUS at 12:46

## 2019-04-07 NOTE — H&P
Hospitalist H&P Note Admit Date:  2019  4:59 PM  
Name:  Vilma Daly Age:  33 y.o. 
:  1990 MRN:  [id number] PCP:  None Treatment Team: Attending Provider: Neville Aldana MD 
 
HPI:  
 
CC:  Hip pain Ms. Hernandez is a 34 yo female with PMH of polysubstance use who is evaluated with malaise and bilateral hip pain. She has UDS showing amphetamines, benzo, opiates and is altered and agitated on my interview. history obtained from chart and from speaking with ED Dr. Jocelyne Ryder. UA positive and has been ordered rocpehin. Also noted CIRA and elevated LFTS. CT haad negative. CXR negative. 10 systems not reviewed due to agitation and mentation Past Medical History:  
Diagnosis Date  Asthma  Chronic back pain  Chronic lower back pain  Psychiatric disorder   
 bipolar  Seizures ([facility] [address] Past Surgical History:  
Procedure Laterality Date  HX GYN    
 2 c-sec, tubal  
  
Allergies Allergen Reactions  Dilantin [Phenytoin Sodium Extended] Itching  Keppra [Levetiracetam] Rash Social History Tobacco Use  Smoking status: Current Every Day Smoker Packs/day: 1.00 Years: 1.00 Pack years: 1.00  Smokeless tobacco: Never Used Substance Use Topics  Alcohol use: Yes Comment: occassionally Unable to obtain her family history due to agitation There is no immunization history on file for this patient. PTA Medications: 
None Objective:  
 
Patient Vitals for the past 24 hrs: 
 Temp Pulse Resp BP SpO2  
19 97.8 °F (36.6 °C)   (!) 126/98 98 % 19    136/83 99 % 19 1753    (!) 134/105 92 % 19 1601 97.1 °F (36.2 °C) (!) 134 18 124/90 99 % Oxygen Therapy O2 Sat (%): 98 % (19) Pulse via Oximetry: 127 beats per minute (19) O2 Device: Room air (19) Intake/Output Summary (Last 24 hours) at 2019 4536 Last data filed at 2019 Gross per 24 hour Intake  Output 500 ml Net -500 ml Physical Exam: 
General:    Lethargic, appears unwell Eyes:   Normal sclera. Extraocular movements intact. PERRLA 
ENT:  Normocephalic, atraumatic.  dry mucous membranes CV:   Regular and tachycardic, No edema Lungs:  CTAB. No wheezing, rhonchi, or rales. anterior exam 
Abdomen: Soft, nontender, nondistended. Present BS Extremities: Cool and slightly mottled Neurologic:  states unable to move RLE, has intact sensation to LE, moves all  extremities spontaneously Skin:     Mottled LE Psych:  Agitated I reviewed the labs, imaging, EKGs, telemetry, and other studies done this admission. EKG: tracing personally reviewed as sinus tachycardia Data Review:  
Recent Results (from the past 24 hour(s)) EKG, 12 LEAD, INITIAL Collection Time: 04/06/19  6:10 PM  
Result Value Ref Range Ventricular Rate 134 BPM  
 Atrial Rate 134 BPM  
 P-R Interval 122 ms QRS Duration 68 ms Q-T Interval 282 ms QTC Calculation (Bezet) 421 ms Calculated P Axis 81 degrees Calculated R Axis 78 degrees Calculated T Axis 62 degrees Diagnosis Sinus tachycardia Biatrial enlargement Nonspecific ST abnormality Abnormal ECG When compared with ECG of 13-SEP-2015 19:29, 
Premature ventricular complexes are no longer Present Vent. rate has increased BY  45 BPM 
ST now depressed in Inferior leads DRUG SCREEN, URINE Collection Time: 04/06/19  6:57 PM  
Result Value Ref Range PCP(PHENCYCLIDINE) NEGATIVE BENZODIAZEPINES POSITIVE    
 COCAINE NEGATIVE AMPHETAMINES POSITIVE METHADONE NEGATIVE     
 THC (TH-CANNABINOL) NEGATIVE     
 OPIATES POSITIVE    
 BARBITURATES NEGATIVE URINE MICROSCOPIC Collection Time: 04/06/19  6:57 PM  
Result Value Ref Range WBC 0 0 /hpf  
 RBC 20-50 0 /hpf Epithelial cells 0 0 /hpf Bacteria TRACE 0 /hpf Casts 0 0 /lpf Crystals, urine 0 0 /LPF Amorphous Crystals 4+ (H) 0 Mucus 2+ (H) 0 /lpf Other observations RESULTS VERIFIED MANUALLY URINALYSIS W/ RFLX MICROSCOPIC Collection Time: 04/06/19  6:57 PM  
Result Value Ref Range Color RED Appearance TURBID Specific gravity 1.026 (H) 1.001 - 1.023    
 pH (UA) 5.5 5.0 - 9.0 Protein 100 (A) NEG mg/dL Glucose 250 mg/dL Ketone 15 (A) NEG mg/dL Bilirubin LARGE (A) NEG Blood LARGE (A) NEG Urobilinogen 1.0 0.2 - 1.0 EU/dL Nitrites POSITIVE (A) NEG Leukocyte Esterase MODERATE (A) NEG    
HCG URINE, QL. - POC Collection Time: 04/06/19  7:01 PM  
Result Value Ref Range Pregnancy test,urine (POC) NEGATIVE  NEG    
CBC WITH AUTOMATED DIFF Collection Time: 04/06/19  7:25 PM  
Result Value Ref Range WBC 23.8 (H) 4.3 - 11.1 K/uL  
 RBC 5.75 (H) 4.05 - 5.2 M/uL  
 HGB 17.3 (H) 11.7 - 15.4 g/dL HCT 52.2 (H) 35.8 - 46.3 % MCV 90.8 79.6 - 97.8 FL  
 MCH 30.1 26.1 - 32.9 PG  
 MCHC 33.1 31.4 - 35.0 g/dL  
 RDW 13.7 11.9 - 14.6 % PLATELET 982 223 - 877 K/uL MPV 9.9 9.4 - 12.3 FL ABSOLUTE NRBC 0.00 0.0 - 0.2 K/uL  
 DF AUTOMATED NEUTROPHILS 88 (H) 43 - 78 % LYMPHOCYTES 6 (L) 13 - 44 % MONOCYTES 5 4.0 - 12.0 % EOSINOPHILS 0 (L) 0.5 - 7.8 % BASOPHILS 0 0.0 - 2.0 % IMMATURE GRANULOCYTES 1 0.0 - 5.0 %  
 ABS. NEUTROPHILS 21.0 (H) 1.7 - 8.2 K/UL  
 ABS. LYMPHOCYTES 1.4 0.5 - 4.6 K/UL  
 ABS. MONOCYTES 1.2 0.1 - 1.3 K/UL  
 ABS. EOSINOPHILS 0.0 0.0 - 0.8 K/UL  
 ABS. BASOPHILS 0.1 0.0 - 0.2 K/UL  
 ABS. IMM. GRANS. 0.2 0.0 - 0.5 K/UL METABOLIC PANEL, COMPREHENSIVE Collection Time: 04/06/19  7:25 PM  
Result Value Ref Range Sodium 131 (L) 136 - 145 mmol/L Potassium 4.4 3.5 - 5.1 mmol/L Chloride 103 98 - 107 mmol/L  
 CO2 17 (L) 21 - 32 mmol/L Anion gap 11 7 - 16 mmol/L Glucose 194 (H) 65 - 100 mg/dL BUN 22 6 - 23 MG/DL Creatinine 1.78 (H) 0.6 - 1.0 MG/DL  
 GFR est AA 43 (L) >60 ml/min/1.73m2 GFR est non-AA 36 (L) >60 ml/min/1.73m2 Calcium 7.9 (L) 8.3 - 10.4 MG/DL Bilirubin, total 0.7 0.2 - 1.1 MG/DL  
 ALT (SGPT) 672 (H) 12 - 65 U/L  
 AST (SGOT) 3,195 (H) 15 - 37 U/L Alk. phosphatase 48 (L) 50 - 136 U/L Protein, total 7.3 6.3 - 8.2 g/dL Albumin 3.1 (L) 3.5 - 5.0 g/dL Globulin 4.2 (H) 2.3 - 3.5 g/dL A-G Ratio 0.7 (L) 1.2 - 3.5 ETHYL ALCOHOL Collection Time: 04/06/19  7:25 PM  
Result Value Ref Range ALCOHOL(ETHYL),SERUM <3 MG/DL  
LACTIC ACID Collection Time: 04/06/19  8:40 PM  
Result Value Ref Range Lactic acid 1.5 0.4 - 2.0 MMOL/L All Micro Results Procedure Component Value Units Date/Time CULTURE, URINE [[id number]] Collected:  04/06/19 1857 Order Status:  Completed Specimen:  Cath Urine Updated:  04/06/19 2310 CULTURE, URINE [[id number]] Order Status:  Sent Specimen:  Urine from Clean catch CULTURE, BLOOD [[id number]] Collected:  04/06/19 2032 Order Status:  Completed Specimen:  Blood Updated:  04/06/19 2130 CULTURE, BLOOD [[id number]] Order Status:  Sent Specimen:  Blood Other Studies: Xr Chest Pa Lat Result Date: 4/6/2019 EXAM: Chest x-ray. INDICATION: Chest pain. COMPARISON: April 1, 2013. TECHNIQUE: Two view chest. FINDINGS: The lungs are clear. The cardiac size, mediastinal contour and pulmonary vasculature are normal. No pneumothorax or pleural effusion is seen. IMPRESSION: Normal chest x-ray. Ct Head Wo Cont Result Date: 4/6/2019 EXAM: Noncontrast CT head. INDICATION: Pain and mental status changes. COMPARISON: October 21, 2015. TECHNIQUE: Noncontrast CT images of the head were obtained. Radiation dose reduction techniques were used for this study. Our CT scanners use one or all of the following:  Automated exposure control, adjustment of the mA or kV according to patient size, iterative reconstruction. FINDINGS: Brain volume is appropriate for age. No acute infarct, hemorrhage or mass is identified. There is no mass effect, midline shift or depressed fracture. The visualized paranasal sinuses and mastoid air cells are clear. IMPRESSION: No acute process. Us Abd Comp Result Date: 4/6/2019 EXAM: Complete abdominal ultrasound. INDICATION: Pain. COMPARISON: None. TECHNIQUE: Standard protocol limited right upper quadrant abdomen ultrasound. FINDINGS: - Liver: Within normal limits. - Gallbladder: Within normal limits. - Bile ducts: Within normal limits. Measures 4.2 mm. - Pancreas: Within normal limits. - Bilateral kidneys: Within normal limits. The right kidney measures 9.7 cm in the left kidney measures 8.4 cm. - Aorta and IVC: Within normal limits. - Portal vein: Within normal limits. - Other: The spleen is normal, measuring 7.9 cm. There is no ascites. IMPRESSION: Unremarkable study. Assessment and Plan:  
 
Hospital Problems as of 4/6/2019 Never Reviewed Codes Class Noted - Resolved POA Sepsis ([facility] ICD-10-CM: A41.9 ICD-9-CM: 038.9, 995.91  4/6/2019 - Present Yes Hepatitis ICD-10-CM: K75.9 ICD-9-CM: 573.3  4/6/2019 - Present Yes Substance use disorder (Chronic) ICD-10-CM: F19.90 ICD-9-CM: 305.90  4/6/2019 - Present Yes Hip pain ICD-10-CM: M25.559 ICD-9-CM: 719.45  4/6/2019 - Present Yes Hyponatremia ICD-10-CM: E87.1 ICD-9-CM: 276.1  4/6/2019 - Present Yes CIRA (acute kidney injury) ([facility] ICD-10-CM: N17.9 ICD-9-CM: 584.9  4/6/2019 - Present Yes  
   
 UTI (urinary tract infection) ICD-10-CM: N39.0 ICD-9-CM: 599.0  4/6/2019 - Present Yes Hypertension ICD-10-CM: I10 
ICD-9-CM: 401.9  4/6/2019 - Present Yes · Sepsis with UTI: due to severity of her il ness and suspected bacteremia with illicit drug use will start vancomycin and zosyn, followup BC x 2, check UCX · Bilateral hip pain and RLE weakness: check hip films and MRI Lspine · Polysubstance use: needs cessation, prn ativan and nicotine patch · CIRA: hydrate and reassess BMP , pending CPK · Elevated LFTS/ hepatitis: check tylenol level/ HIV/ hepatitis panel and ABD US 
· Hyponatremia: hydrate and reassess Discharge planning:  pending DVT ppx: SCD Code status:  Full Estimated LOS:  Greater than 2 midnights Risk:  high Care plan: unable to obtain Signed: Charanjit Griffin MD

## 2019-04-07 NOTE — PROGRESS NOTES
Pharmacokinetic Consult to Pharmacist 
 
Orlandotamiko Pickens is a 33 y.o. female being treated for sepsis with Vancomycin. Height: 4' 11\" (149.9 cm)  Weight: 56.7 kg (125 lb) Lab Results Component Value Date/Time BUN 22 04/06/2019 07:25 PM  
 Creatinine 1.78 (H) 04/06/2019 07:25 PM  
 WBC 23.8 (H) 04/06/2019 07:25 PM  
 Lactic acid 1.5 04/06/2019 08:40 PM  
 Lactic Acid (POC) 1.34 03/28/2019 02:44 PM  
  
Estimated Creatinine Clearance: 35.9 mL/min (A) (based on SCr of 1.78 mg/dL (H)). CULTURES: 
All Micro Results Procedure Component Value Units Date/Time CULTURE, URINE [[id number]] Collected:  04/06/19 1857 Order Status:  Completed Specimen:  Cath Urine Updated:  04/06/19 2310 CULTURE, URINE [[id number]] Order Status:  Sent Specimen:  Urine from Clean catch CULTURE, BLOOD [[id number]] Collected:  04/06/19 2032 Order Status:  Completed Specimen:  Blood Updated:  04/06/19 2130 CULTURE, BLOOD [[id number]] Order Status:  Sent Specimen:  Blood Day 1 of vancomycin. Goal trough is 15-20. Vancomycin dose initiated at 1500 mg x 1 dose; followed by intermittent dosing . Will continue to follow patient. Thank you, Aleida Ruiz, Pharm D.

## 2019-04-07 NOTE — ED NOTES
TRANSFER - OUT REPORT: 
 
Verbal report given to Farhat(name) on Darlynn Levels  being transferred to [facility](unit) for routine progression of care Report consisted of patients Situation, Background, Assessment and  
Recommendations(SBAR). Information from the following report(s) SBAR, ED Summary, [facility] and Recent Results was reviewed with the receiving nurse. Lines:  
Peripheral IV 04/06/19 Right Wrist (Active) Site Assessment Clean, dry, & intact 4/6/2019  7:06 PM  
Phlebitis Assessment 0 4/6/2019  7:06 PM  
Infiltration Assessment 0 4/6/2019  7:06 PM  
Dressing Status Clean, dry, & intact 4/6/2019  7:06 PM  
Alcohol Cap Used No 4/6/2019  7:06 PM  
   
Peripheral IV 04/06/19 Right Antecubital (Active) Opportunity for questions and clarification was provided. Patient transported with: 
 [facility]

## 2019-04-07 NOTE — PROGRESS NOTES
Hospitalist Progress Note 2019 Admit Date: 2019  4:59 PM  
NAME: Gretel Corral :  1990 MRN:  [id number] Attending: Dilip Lopes MD 
PCP:  None SUBJECTIVE:  
Patient is a 32 yo CF with a history of polysubstance use, asthma, chronic pain, seizures, Bipolar who presented to the ED with a complaint of malaise, b/l hip pain. UDS showing amphetamines, benzo, opiates. Admitted with Sepsis, UTI, CIRA and hepatitis. : No Events over night. Patient complains of severe b/l calf, leg, and \"butt\" pain, swelling. States pain medication only last 30 minutes. No chest pain, abd pain or palpitations. Review of Systems negative with exception of pertinent positives noted above PHYSICAL EXAM  
 
Visit Vitals /90 Pulse (!) 126 Comment: Notified RN Temp 97.4 °F (36.3 °C) Resp 20 Ht 4' 11\" (1.499 m) Wt 56.7 kg (125 lb) SpO2 98% BMI 25.25 kg/m² Temp (24hrs), Av.5 °F (36.4 °C), Min:97.1 °F (36.2 °C), Max:97.8 °F (36.6 °C) Oxygen Therapy O2 Sat (%): 98 % (19 0754) Pulse via Oximetry: 127 beats per minute (19) O2 Device: Room air (19) Intake/Output Summary (Last 24 hours) at 2019 1040 Last data filed at 2019 0340 Gross per 24 hour Intake  Output 500 ml Net -500 ml General: Alert and oriented, acute painful distress, thrashing in bed Eye: EOMI, Normal conjunctiva HENT: Normocephalic, atraumatic, Normal hearing, dry mucous membranes. Neck: Supple, Non-tender. Respiratory: Lungs are clear to auscultation, Respirations are non-labored. Cardiovascular: Normal rate, Regular rhythm, No murmur. Gastrointestinal: Soft, Non-tender, nondistended, positive bowel sounds Musculoskeletal: 1+ LE edema, extremities cold to touch, decreased pedal pulses appreciatable. Painful palpation of b/l calves. Integumentary: Warm, Dry, Pink, no rash. Neurologic: Alert, Oriented, No focal deficits. Cognition and Speech: Oriented, Speech clear and coherent. Psychiatric: Crying, asking for pain medication. EKG: Sinus Tachycardia ASSESSMENT Active Hospital Problems Diagnosis Date Noted  Rhabdomyolysis 04/07/2019  Sepsis ([facility] 04/06/2019  Hepatitis 04/06/2019  Substance use disorder 04/06/2019  Hip pain 04/06/2019  Hyponatremia 04/06/2019  CIRA (acute kidney injury) ([facility] 04/06/2019  UTI (urinary tract infection) 04/06/2019  Hypertension 04/06/2019  Asthma 05/19/2016 Last Assessment & Plan:  
Albuterol prn  Borderline personality disorder ([facility] 05/19/2016 Last Assessment & Plan:  
Continue Prozac, Seroquel, and trazodone Sepsis, Acute urinary tract infection - IV Vanc & Zosyn due to suspected bacteremia with ilicit drug use and severity of illness. - IVFs. - Follow Urine C&S. 
- Follow blood cultures - CXR: NAD 
- Tele - LA 1.5 Acute kidney injury 
- Hold nephrotoxic medications. - Follow BUN and Cr.  
- likely prerenal secondary to dehydration , sepsis 
- IVF - Abd US NAD Rhabdomyolysis - IVFs - Follow CK Elevated LFTs/ Hepatitis - tylenol level/ HIV/ hepatitis panel pending - Abd US NAD 
- follow lfts Hyponatremia - Repeat and follow Na.  
- Hold SSRIs, thiazide diuretics. - Goal is not to increase Na more than 8-12 mEq/L/d. - Hypovolemia. Most likely secondary to dehydration. Give IVFs. H/o Asthma 
- controlled 
- monitor breathing Acute encephalopathy  
- CT head NAD 
- likely due to infection and improved. - tele, neurochecks Acute on chronic pain 
- with polysubstance abuse and likely tolerance - treat pain appropriately and use of non pharmacological therapy encouraged B/L Leg Pain 
- edema, decreased pedal pulses - Order venous doppler, r/o DVT 
- MRI lumbar spine Pending Hip Pain: - XR NAD 
- acute likely 2/2 rhabdo Polysubstance use - aware Seizures - aware Psychiatric Disease - continue home medications. DVT prophylaxis: heparin Full Code Dispo: to home when stable. Total Time spent: 35 minutes. Counseling & coordinating care dominated the encounter >55%. Counseled patient regarding dx, rx, tx. Reviewed prior records, labs, vitals, diagnostic tests, flow sheet, home medications, inpatient medications, nursing and provider notes.  
 
Chelsy Almonte PA-C, MPAS

## 2019-04-08 ENCOUNTER — APPOINTMENT (OUTPATIENT)
Dept: INTERVENTIONAL RADIOLOGY/VASCULAR | Age: 29
DRG: 871 | End: 2019-04-08
Attending: NURSE PRACTITIONER
Payer: SELF-PAY

## 2019-04-08 ENCOUNTER — APPOINTMENT (OUTPATIENT)
Dept: ULTRASOUND IMAGING | Age: 29
DRG: 871 | End: 2019-04-08
Attending: FAMILY MEDICINE
Payer: SELF-PAY

## 2019-04-08 PROBLEM — G92.9 ENCEPHALOPATHY, TOXIC: Status: ACTIVE | Noted: 2019-04-08

## 2019-04-08 PROBLEM — G92.8 TOXIC METABOLIC ENCEPHALOPATHY: Status: ACTIVE | Noted: 2019-04-08

## 2019-04-08 LAB
ALBUMIN SERPL-MCNC: 1.7 G/DL (ref 3.5–5)
ALBUMIN/GLOB SERPL: 0.6 {RATIO} (ref 1.2–3.5)
ALP SERPL-CCNC: 29 U/L (ref 50–136)
ALT SERPL-CCNC: 310 U/L (ref 12–65)
ANION GAP SERPL CALC-SCNC: 14 MMOL/L (ref 7–16)
APTT PPP: 63 SEC (ref 24.7–39.8)
APTT PPP: 96.9 SEC (ref 24.7–39.8)
AST SERPL-CCNC: 726 U/L (ref 15–37)
BASOPHILS # BLD: 0.1 K/UL (ref 0–0.2)
BASOPHILS NFR BLD: 0 % (ref 0–2)
BILIRUB SERPL-MCNC: 0.5 MG/DL (ref 0.2–1.1)
BUN SERPL-MCNC: 44 MG/DL (ref 6–23)
CALCIUM SERPL-MCNC: 6.9 MG/DL (ref 8.3–10.4)
CHLORIDE SERPL-SCNC: 106 MMOL/L (ref 98–107)
CK SERPL-CCNC: ABNORMAL U/L (ref 21–215)
CO2 SERPL-SCNC: 14 MMOL/L (ref 21–32)
CREAT SERPL-MCNC: 5.31 MG/DL (ref 0.6–1)
DIFFERENTIAL METHOD BLD: ABNORMAL
EOSINOPHIL # BLD: 0 K/UL (ref 0–0.8)
EOSINOPHIL NFR BLD: 0 % (ref 0.5–7.8)
ERYTHROCYTE [DISTWIDTH] IN BLOOD BY AUTOMATED COUNT: 13.8 % (ref 11.9–14.6)
GLOBULIN SER CALC-MCNC: 3 G/DL (ref 2.3–3.5)
GLUCOSE SERPL-MCNC: 84 MG/DL (ref 65–100)
HCT VFR BLD AUTO: 37.2 % (ref 35.8–46.3)
HGB BLD-MCNC: 12.3 G/DL (ref 11.7–15.4)
IMM GRANULOCYTES # BLD AUTO: 0.1 K/UL (ref 0–0.5)
IMM GRANULOCYTES NFR BLD AUTO: 1 % (ref 0–5)
INR PPP: 1
LYMPHOCYTES # BLD: 2.3 K/UL (ref 0.5–4.6)
LYMPHOCYTES NFR BLD: 13 % (ref 13–44)
MCH RBC QN AUTO: 31 PG (ref 26.1–32.9)
MCHC RBC AUTO-ENTMCNC: 33.1 G/DL (ref 31.4–35)
MCV RBC AUTO: 93.7 FL (ref 79.6–97.8)
MONOCYTES # BLD: 1.1 K/UL (ref 0.1–1.3)
MONOCYTES NFR BLD: 6 % (ref 4–12)
NEUTS SEG # BLD: 13.5 K/UL (ref 1.7–8.2)
NEUTS SEG NFR BLD: 79 % (ref 43–78)
NRBC # BLD: 0 K/UL (ref 0–0.2)
PLATELET # BLD AUTO: 147 K/UL (ref 150–450)
PMV BLD AUTO: 10.6 FL (ref 9.4–12.3)
POTASSIUM SERPL-SCNC: 4.5 MMOL/L (ref 3.5–5.1)
PROT SERPL-MCNC: 4.7 G/DL (ref 6.3–8.2)
PROTHROMBIN TIME: 13.3 SEC (ref 11.7–14.5)
RBC # BLD AUTO: 3.97 M/UL (ref 4.05–5.2)
SODIUM SERPL-SCNC: 134 MMOL/L (ref 136–145)
VANCOMYCIN SERPL-MCNC: 26.7 UG/ML
WBC # BLD AUTO: 17 K/UL (ref 4.3–11.1)

## 2019-04-08 PROCEDURE — 74011250637 HC RX REV CODE- 250/637: Performed by: INTERNAL MEDICINE

## 2019-04-08 PROCEDURE — 77030020263 HC SOL INJ SOD CL0.9% LFCR 1000ML

## 2019-04-08 PROCEDURE — 74011250636 HC RX REV CODE- 250/636: Performed by: INTERNAL MEDICINE

## 2019-04-08 PROCEDURE — 76857 US EXAM PELVIC LIMITED: CPT

## 2019-04-08 PROCEDURE — 36415 COLL VENOUS BLD VENIPUNCTURE: CPT

## 2019-04-08 PROCEDURE — 85025 COMPLETE CBC W/AUTO DIFF WBC: CPT

## 2019-04-08 PROCEDURE — 80053 COMPREHEN METABOLIC PANEL: CPT

## 2019-04-08 PROCEDURE — 85730 THROMBOPLASTIN TIME PARTIAL: CPT

## 2019-04-08 PROCEDURE — 77030018719 HC DRSG PTCH ANTIMIC J&J -A

## 2019-04-08 PROCEDURE — 85610 PROTHROMBIN TIME: CPT

## 2019-04-08 PROCEDURE — 74011250636 HC RX REV CODE- 250/636

## 2019-04-08 PROCEDURE — 02H633Z INSERTION OF INFUSION DEVICE INTO RIGHT ATRIUM, PERCUTANEOUS APPROACH: ICD-10-PCS | Performed by: RADIOLOGY

## 2019-04-08 PROCEDURE — 74011250636 HC RX REV CODE- 250/636: Performed by: RADIOLOGY

## 2019-04-08 PROCEDURE — 80202 ASSAY OF VANCOMYCIN: CPT

## 2019-04-08 PROCEDURE — 74011000258 HC RX REV CODE- 258: Performed by: INTERNAL MEDICINE

## 2019-04-08 PROCEDURE — C1894 INTRO/SHEATH, NON-LASER: HCPCS

## 2019-04-08 PROCEDURE — 93306 TTE W/DOPPLER COMPLETE: CPT

## 2019-04-08 PROCEDURE — 77001 FLUOROGUIDE FOR VEIN DEVICE: CPT

## 2019-04-08 PROCEDURE — 76830 TRANSVAGINAL US NON-OB: CPT

## 2019-04-08 PROCEDURE — 82550 ASSAY OF CK (CPK): CPT

## 2019-04-08 PROCEDURE — 74011250637 HC RX REV CODE- 250/637: Performed by: FAMILY MEDICINE

## 2019-04-08 PROCEDURE — 65660000000 HC RM CCU STEPDOWN

## 2019-04-08 PROCEDURE — C1750 CATH, HEMODIALYSIS,LONG-TERM: HCPCS

## 2019-04-08 PROCEDURE — 77030002996 HC SUT SLK J&J -A

## 2019-04-08 RX ORDER — LANOLIN ALCOHOL/MO/W.PET/CERES
100 CREAM (GRAM) TOPICAL DAILY
Status: DISCONTINUED | OUTPATIENT
Start: 2019-04-08 | End: 2019-04-24 | Stop reason: HOSPADM

## 2019-04-08 RX ORDER — FOLIC ACID 1 MG/1
1 TABLET ORAL DAILY
Status: DISCONTINUED | OUTPATIENT
Start: 2019-04-08 | End: 2019-04-24 | Stop reason: HOSPADM

## 2019-04-08 RX ORDER — HEPARIN SODIUM 5000 [USP'U]/ML
35 INJECTION, SOLUTION INTRAVENOUS; SUBCUTANEOUS ONCE
Status: COMPLETED | OUTPATIENT
Start: 2019-04-08 | End: 2019-04-08

## 2019-04-08 RX ORDER — HEPARIN SODIUM 200 [USP'U]/100ML
1000 INJECTION, SOLUTION INTRAVENOUS
Status: DISCONTINUED | OUTPATIENT
Start: 2019-04-08 | End: 2019-04-09 | Stop reason: HOSPADM

## 2019-04-08 RX ORDER — HEPARIN SODIUM 1000 [USP'U]/ML
1000-10000 INJECTION, SOLUTION INTRAVENOUS; SUBCUTANEOUS ONCE
Status: COMPLETED | OUTPATIENT
Start: 2019-04-08 | End: 2019-04-08

## 2019-04-08 RX ORDER — DOXYCYCLINE 100 MG/1
100 CAPSULE ORAL EVERY 12 HOURS
Status: DISCONTINUED | OUTPATIENT
Start: 2019-04-08 | End: 2019-04-09

## 2019-04-08 RX ORDER — FENTANYL CITRATE 50 UG/ML
100 INJECTION, SOLUTION INTRAMUSCULAR; INTRAVENOUS ONCE
Status: COMPLETED | OUTPATIENT
Start: 2019-04-08 | End: 2019-04-08

## 2019-04-08 RX ORDER — LIDOCAINE HYDROCHLORIDE 20 MG/ML
40-120 INJECTION, SOLUTION INFILTRATION; PERINEURAL ONCE
Status: COMPLETED | OUTPATIENT
Start: 2019-04-08 | End: 2019-04-08

## 2019-04-08 RX ADMIN — ONDANSETRON 4 MG: 2 INJECTION INTRAMUSCULAR; INTRAVENOUS at 15:32

## 2019-04-08 RX ADMIN — MORPHINE SULFATE 2 MG: 2 INJECTION, SOLUTION INTRAMUSCULAR; INTRAVENOUS at 15:32

## 2019-04-08 RX ADMIN — Medication 100 MG: at 14:27

## 2019-04-08 RX ADMIN — HEPARIN SODIUM 2000 UNITS: 5000 INJECTION INTRAVENOUS; SUBCUTANEOUS at 23:04

## 2019-04-08 RX ADMIN — MORPHINE SULFATE 2 MG: 2 INJECTION, SOLUTION INTRAMUSCULAR; INTRAVENOUS at 06:49

## 2019-04-08 RX ADMIN — FENTANYL CITRATE 100 MCG: 50 INJECTION, SOLUTION INTRAMUSCULAR; INTRAVENOUS at 13:44

## 2019-04-08 RX ADMIN — MORPHINE SULFATE 2 MG: 2 INJECTION, SOLUTION INTRAMUSCULAR; INTRAVENOUS at 19:25

## 2019-04-08 RX ADMIN — MORPHINE SULFATE 2 MG: 2 INJECTION, SOLUTION INTRAMUSCULAR; INTRAVENOUS at 23:25

## 2019-04-08 RX ADMIN — SODIUM CHLORIDE 150 ML/HR: 900 INJECTION, SOLUTION INTRAVENOUS at 11:16

## 2019-04-08 RX ADMIN — PIPERACILLIN AND TAZOBACTAM 3.38 G: 3; .375 INJECTION, POWDER, FOR SOLUTION INTRAVENOUS at 18:05

## 2019-04-08 RX ADMIN — DOXYCYCLINE HYCLATE 100 MG: 100 CAPSULE ORAL at 01:48

## 2019-04-08 RX ADMIN — SODIUM CHLORIDE 150 ML/HR: 900 INJECTION, SOLUTION INTRAVENOUS at 22:40

## 2019-04-08 RX ADMIN — PIPERACILLIN AND TAZOBACTAM 3.38 G: 3; .375 INJECTION, POWDER, FOR SOLUTION INTRAVENOUS at 05:49

## 2019-04-08 RX ADMIN — MORPHINE SULFATE 2 MG: 2 INJECTION, SOLUTION INTRAMUSCULAR; INTRAVENOUS at 10:32

## 2019-04-08 RX ADMIN — DOXYCYCLINE HYCLATE 100 MG: 100 CAPSULE ORAL at 10:32

## 2019-04-08 RX ADMIN — HEPARIN SODIUM 2300 UNITS: 1000 INJECTION INTRAVENOUS; SUBCUTANEOUS at 13:53

## 2019-04-08 RX ADMIN — DOXYCYCLINE HYCLATE 100 MG: 100 CAPSULE ORAL at 22:40

## 2019-04-08 RX ADMIN — HEPARIN SODIUM 2000 UNITS: 200 INJECTION, SOLUTION INTRAVENOUS at 13:53

## 2019-04-08 RX ADMIN — MORPHINE SULFATE 2 MG: 2 INJECTION, SOLUTION INTRAMUSCULAR; INTRAVENOUS at 01:48

## 2019-04-08 RX ADMIN — FOLIC ACID 1 MG: 1 TABLET ORAL at 14:27

## 2019-04-08 RX ADMIN — LIDOCAINE HYDROCHLORIDE 100 MG: 20 INJECTION, SOLUTION INFILTRATION; PERINEURAL at 13:51

## 2019-04-08 RX ADMIN — HEPARIN SODIUM 18 UNITS/KG/HR: 5000 INJECTION, SOLUTION INTRAVENOUS at 01:04

## 2019-04-08 RX ADMIN — ONDANSETRON 4 MG: 2 INJECTION INTRAMUSCULAR; INTRAVENOUS at 10:32

## 2019-04-08 NOTE — PROGRESS NOTES
Labs show worsening CIRA, will consult renal. Also has left non-occlusinve SFA stenosis, on IV heparin and will consult vascular, pending INR, check ECHO Alex Robin MD

## 2019-04-08 NOTE — PROGRESS NOTES
Spoke to Ms. Hernandez (very drowsy, only able or willing to say a word or two) and her friend Mr. Chelsie Pisano (his cell is [phone]). Ms. Max Yanes gave verbal approval to speak to friend Kenisha Means in the room, and also to her aunt Ms. Angely Landry via phone ([phone]). Ms. Gonzalo Inman aunsallie has raised her since Ms. Hernandez was around 14years old. Ms. Hernandez's 2 children (9 year old girl and 6year old boy) are in the permanent custody of the aunt. The aunt said that Ms. Hernandez is not welcome to stay with her, as the aunt does not want the children exposed to the \"drugs and alcohol. \"  Aunt also said Ms. Hernandez may be facing jail time for parole violations, and that the last time Ms. Hernandez was in alf was [place] a month ago. \" Asked Ms. Hernandez if she was interested in NA or AA, and she did not respond. Kenisha Clary said that she has been in drug and alcohol rehab in the past, but did not elaborate. He said that she is welcome to stay with him at discharge. Ms. Max Yanes is also \"self pay\" (no health insurance), and has no PCP. Will follow and assist, as able, with discharge planning. Will make sure Deco sees her, and when she is more awake, discuss [place] Medicaid and [employer]. Care Management Interventions Current Support Network: Karen, Other Confirm Follow Up Transport: Family Plan discussed with Pt/Family/Caregiver:  Yes

## 2019-04-08 NOTE — ROUTINE PROCESS
gastroview is ordered and can be taken out of pixis on floor. Pt to drink and wait one hour after drinking.  
 
Floor please notify CT when patient finishes oral contrast.

## 2019-04-08 NOTE — PROGRESS NOTES
Hospitalist Progress Note Admit Date:  2019  4:59 PM  
Name:  José Miguel Galvan Age:  33 y.o. 
:  1990 MRN:  [id number] PCP:  None Treatment Team: Attending Provider: Caron Gonzalez MD; Consulting Provider: Dhara Foster MD; Care Manager: Jocy Raygoza, RN; Consulting Provider: Malika Almanzar MD 
 
Subjective:  
Ms. Leslie Severe is a 32 yo female with PMH of polysubstance use who is evaluated with malaise and bilateral hip pain. She has UDS showing amphetamines, benzo, opiates and is altered and agitated on my interview. history obtained from chart and from speaking with ED Dr. Christian Edwards. UA positive and has been ordered rocpehin. Also noted CIRA and elevated LFTS. CT haad negative. CXR negative. Also admitted for rhabdomyolysis, bilateral hip pain and rt lowe ext weakness,sepsis,hyponatremia and elevated lfts. Pt has rt peroneal vein dvt- presently on heparin. Arterial doppler showed - 
IMPRESSION: 
Evidence to suggest arterial occlusive disease in the small vessels of the left 
foot.  
Mild to moderate stenosis in the mid left superficial femoral artery.  Turbulent flow in the left common femoral artery raises the possibility of 
atherosclerotic plaque or stenosis in the left external iliac artery. Vascular surgery  Consulted- no intervention required. MRI lumbar spine- 
IMPRESSION: 
1. Lumbar spine is unremarkable. 2.  Developing presacral fluid/edema, uncertain significance. Suggest CT of the 
pelvis with IV contrast 
 
Since she was in CIRA(nephrology was consulted)- pt had cy abd pelvis with out contrast- 
IMPRESSION: 
1. New fluid and stranding throughout the pelvic fat, of unclear etiology. Differential considerations would include pelvic inflammatory disease or the 
sequela of a ruptured ovarian cyst. 
2. There are also new edematous changes in the subcutaneous tissue of the pelvis 
and bilateral buttock muscles. Correlate for myositis. 19 Pt c/o pain all over the body Says still cant move rt lower ext Wants more pain medications Improved wbc but worsen creatinine Still elevated cpk.  who has pts children says pt also drinks lot of alcohol- says recently was seen at [address] er- was told uti with sepsis- advised admission(? 2 weeks ago)- but pt refused because she was starting a new job, says did take antibiotics for 2 days then thought those medications would cause her seizures hence stopped taking antibiotics. Has previous h/o PID. Objective:  
 
Patient Vitals for the past 24 hrs: 
 Temp Pulse Resp BP SpO2  
04/08/19 1515 97.4 °F (36.3 °C) 98 18 150/88 99 % 04/08/19 1315 98 °F (36.7 °C) (!) 105 16 149/89 98 % 04/08/19 1156 97.8 °F (36.6 °C) 97 16 138/74 98 % 04/08/19 0748 98 °F (36.7 °C) (!) 101 16 140/70 99 % 04/08/19 0328 98.5 °F (36.9 °C) (!) 109 16 120/78 97 % Oxygen Therapy O2 Sat (%): 99 % (04/08/19 1515) Pulse via Oximetry: 127 beats per minute (04/06/19 2119) O2 Device: Room air (04/08/19 1500) Intake/Output Summary (Last 24 hours) at 4/8/2019 1932 Last data filed at 4/8/2019 5457 Gross per 24 hour Intake 2837 ml Output 150 ml Net 2687 ml General:    Well nourished. Alert.   
heent-normal 
CV:   RRR. No murmur, rub, or gallop. Lungs:   Clear to auscultation bilaterally. No wheezing, rhonchi, or rales. Abdomen:   Soft, mild lower abd tenderness, no rebound or guarding Cns- says unable to move rt lower ext- scratching the rt plantar pt was able to flexion movement. normal movements of the all other extremities Extremities: Warm and dry. No cyanosis or edema. C/o pain on palpation both lower ext,buttocks. Skin:     No rashes or jaundice. Data Review: 
I have reviewed all labs, meds, telemetry events, and studies from the last 24 hours. Recent Results (from the past 24 hour(s)) METABOLIC PANEL, COMPREHENSIVE Collection Time: 04/07/19  9:30 PM  
Result Value Ref Range Sodium 134 (L) 136 - 145 mmol/L Potassium 4.7 3.5 - 5.1 mmol/L Chloride 105 98 - 107 mmol/L  
 CO2 18 (L) 21 - 32 mmol/L Anion gap 11 7 - 16 mmol/L Glucose 82 65 - 100 mg/dL BUN 39 (H) 6 - 23 MG/DL Creatinine 4.13 (H) 0.6 - 1.0 MG/DL  
 GFR est AA 16 (L) >60 ml/min/1.73m2 GFR est non-AA 14 (L) >60 ml/min/1.73m2 Calcium 7.4 (L) 8.3 - 10.4 MG/DL Bilirubin, total 0.5 0.2 - 1.1 MG/DL  
 ALT (SGPT) 491 (H) 12 - 65 U/L  
 AST (SGOT) 1,155 (H) 15 - 37 U/L Alk. phosphatase 39 (L) 50 - 136 U/L Protein, total 6.1 (L) 6.3 - 8.2 g/dL Albumin 2.3 (L) 3.5 - 5.0 g/dL Globulin 3.8 (H) 2.3 - 3.5 g/dL A-G Ratio 0.6 (L) 1.2 - 3.5    
CBC WITH AUTOMATED DIFF Collection Time: 04/07/19  9:30 PM  
Result Value Ref Range WBC 21.2 (H) 4.3 - 11.1 K/uL  
 RBC 5.59 (H) 4.05 - 5.2 M/uL  
 HGB 17.1 (H) 11.7 - 15.4 g/dL HCT 51.4 (H) 35.8 - 46.3 % MCV 91.9 79.6 - 97.8 FL  
 MCH 30.6 26.1 - 32.9 PG  
 MCHC 33.3 31.4 - 35.0 g/dL  
 RDW 14.0 11.9 - 14.6 % PLATELET 064 089 - 124 K/uL MPV 10.8 9.4 - 12.3 FL ABSOLUTE NRBC 0.00 0.0 - 0.2 K/uL NEUTROPHILS 81 (H) 43 - 78 % LYMPHOCYTES 13 13 - 44 % MONOCYTES 5 4.0 - 12.0 % EOSINOPHILS 0 (L) 0.5 - 7.8 % BASOPHILS 0 0.0 - 2.0 % IMMATURE GRANULOCYTES 1 0.0 - 5.0 %  
 ABS. NEUTROPHILS 17.1 (H) 1.7 - 8.2 K/UL  
 ABS. LYMPHOCYTES 2.8 0.5 - 4.6 K/UL  
 ABS. MONOCYTES 1.1 0.1 - 1.3 K/UL  
 ABS. EOSINOPHILS 0.0 0.0 - 0.8 K/UL  
 ABS. BASOPHILS 0.0 0.0 - 0.2 K/UL  
 ABS. IMM. GRANS. 0.2 0.0 - 0.5 K/UL  
 DF AUTOMATED ACETAMINOPHEN Collection Time: 04/07/19  9:30 PM  
Result Value Ref Range Acetaminophen level 3 (L) 10.0 - 30.0 ug/mL HIV-1,2 P24 AG/AB SCREEN Collection Time: 04/07/19  9:30 PM  
Result Value Ref Range p24 Antigen NONREACTIVE NR    
 HIV-1,2 Ab NONREACTIVE NR    
VANCOMYCIN, RANDOM Collection Time: 04/08/19 12:31 AM  
Result Value Ref Range  Vancomycin, random 26.7 UG/ML  
PROTHROMBIN TIME + INR  
 Collection Time: 04/08/19 12:31 AM  
Result Value Ref Range Prothrombin time 13.3 11.7 - 14.5 sec INR 1.0 METABOLIC PANEL, COMPREHENSIVE Collection Time: 04/08/19  7:57 AM  
Result Value Ref Range Sodium 134 (L) 136 - 145 mmol/L Potassium 4.5 3.5 - 5.1 mmol/L Chloride 106 98 - 107 mmol/L  
 CO2 14 (L) 21 - 32 mmol/L Anion gap 14 7 - 16 mmol/L Glucose 84 65 - 100 mg/dL BUN 44 (H) 6 - 23 MG/DL Creatinine 5.31 (H) 0.6 - 1.0 MG/DL  
 GFR est AA 12 (L) >60 ml/min/1.73m2 GFR est non-AA 10 (L) >60 ml/min/1.73m2 Calcium 6.9 (L) 8.3 - 10.4 MG/DL Bilirubin, total 0.5 0.2 - 1.1 MG/DL  
 ALT (SGPT) 310 (H) 12 - 65 U/L  
 AST (SGOT) 726 (H) 15 - 37 U/L Alk. phosphatase 29 (L) 50 - 136 U/L Protein, total 4.7 (L) 6.3 - 8.2 g/dL Albumin 1.7 (L) 3.5 - 5.0 g/dL Globulin 3.0 2.3 - 3.5 g/dL A-G Ratio 0.6 (L) 1.2 - 3.5    
CBC WITH AUTOMATED DIFF Collection Time: 04/08/19  7:57 AM  
Result Value Ref Range WBC 17.0 (H) 4.3 - 11.1 K/uL  
 RBC 3.97 (L) 4.05 - 5.2 M/uL  
 HGB 12.3 11.7 - 15.4 g/dL HCT 37.2 35.8 - 46.3 % MCV 93.7 79.6 - 97.8 FL  
 MCH 31.0 26.1 - 32.9 PG  
 MCHC 33.1 31.4 - 35.0 g/dL  
 RDW 13.8 11.9 - 14.6 % PLATELET 175 (L) 031 - 450 K/uL MPV 10.6 9.4 - 12.3 FL ABSOLUTE NRBC 0.00 0.0 - 0.2 K/uL  
 DF AUTOMATED NEUTROPHILS 79 (H) 43 - 78 % LYMPHOCYTES 13 13 - 44 % MONOCYTES 6 4.0 - 12.0 % EOSINOPHILS 0 (L) 0.5 - 7.8 % BASOPHILS 0 0.0 - 2.0 % IMMATURE GRANULOCYTES 1 0.0 - 5.0 %  
 ABS. NEUTROPHILS 13.5 (H) 1.7 - 8.2 K/UL  
 ABS. LYMPHOCYTES 2.3 0.5 - 4.6 K/UL  
 ABS. MONOCYTES 1.1 0.1 - 1.3 K/UL  
 ABS. EOSINOPHILS 0.0 0.0 - 0.8 K/UL  
 ABS. BASOPHILS 0.1 0.0 - 0.2 K/UL  
 ABS. IMM. GRANS. 0.1 0.0 - 0.5 K/UL  
CK Collection Time: 04/08/19  7:57 AM  
Result Value Ref Range CK >14,000 (H) 21 - 215 U/L  
PTT Collection Time: 04/08/19  7:57 AM  
Result Value Ref Range aPTT 96.9 (H) 24.7 - 39.8 SEC All Micro Results Procedure Component Value Units Date/Time CULTURE, BLOOD [[id number]] Collected:  04/06/19 2032 Order Status:  Completed Specimen:  Blood Updated:  04/08/19 6698 Special Requests: --     
  LEFT 
HAND Culture result: NO GROWTH 2 DAYS     
 CULTURE, URINE [[id number]] Collected:  04/06/19 1857 Order Status:  Completed Specimen:  Cath Urine Updated:  04/08/19 6916 Special Requests: NO SPECIAL REQUESTS Culture result: NO GROWTH 1 DAY     
 CULTURE, BLOOD [[id number]] Collected:  04/07/19 2130 Order Status:  Completed Specimen:  Blood Updated:  04/08/19 0114 CULTURE, URINE [[id number]] Collected:  04/06/19 2145 Order Status:  Canceled Specimen:  Urine from Clean catch CULTURE, BLOOD [[id number]] Order Status:  Sent Specimen:  Blood Current Meds: 
Current Facility-Administered Medications Medication Dose Route Frequency  doxycycline (VIBRAMYCIN) capsule 100 mg  100 mg Oral Q12H  piperacillin-tazobactam (ZOSYN) 3.375 g in 0.9% sodium chloride (MBP/ADV) 100 mL  3.375 g IntraVENous Q12H  thiamine HCL (B-1) tablet 100 mg  100 mg Oral DAILY  folic acid (FOLVITE) tablet 1 mg  1 mg Oral DAILY  heparin (PF) 2 units/ml in NS infusion 2,000 Units  1,000 mL Irrigation Multiple  heparin 25,000 units in dextrose 500 mL infusion  18-36 Units/kg/hr IntraVENous TITRATE  sodium chloride (NS) flush 5-10 mL  5-10 mL IntraVENous PRN  
 ondansetron (ZOFRAN) injection 4 mg  4 mg IntraVENous Q6H PRN  
 0.9% sodium chloride infusion  150 mL/hr IntraVENous CONTINUOUS  
 nicotine (NICODERM CQ) 21 mg/24 hr patch 1 Patch  1 Patch TransDERmal DAILY  LORazepam (ATIVAN) injection 1 mg  1 mg IntraVENous Q6H PRN  
 morphine injection 2 mg  2 mg IntraVENous Q4H PRN  
 naloxone (NARCAN) injection 0.4 mg  0.4 mg IntraVENous EVERY 2 MINUTES AS NEEDED Other Studies (last 24 hours): 
Ct Abd Pelv Wo Cont Result Date: 4/8/2019 EXAM: CT abdomen and pelvis without IV contrast. INDICATION: Pain. COMPARISON: Prior CT abdomen and pelvis on March 28, 2019. TECHNIQUE: Axial CT images of abdomen and pelvis were obtained after oral contrast. No IV contrast was administered. Radiation dose reduction techniques were used for this study. Our CT scanners use one or all of the following: Automated exposure control, adjustment of the mA and/or kV according to patient size, iterative reconstruction. FINDINGS: There is new fluid and stranding throughout the pelvic fat. No focal abscess or associated gas is identified. The urinary bladder is decompressed around a Smith catheter. The uterus is present. The ovaries are grossly normal.  The gallbladder is mildly distended, but otherwise normal in appearance. No calcified gallstones are identified. The liver, pancreas, spleen, adrenal glands, kidneys, bowel, appendix and abdominal aorta are within normal limits. The lung bases are clear. The bony structures are unremarkable. New edematous changes are also noted in the subcutaneous tissue of the pelvis and bilateral buttock muscles, without associated gas. IMPRESSION: 1. New fluid and stranding throughout the pelvic fat, of unclear etiology. Differential considerations would include pelvic inflammatory disease or the sequela of a ruptured ovarian cyst. 2. There are also new edematous changes in the subcutaneous tissue of the pelvis and bilateral buttock muscles. Correlate for myositis. [facility] [facility] Result Date: 4/8/2019 Indication:33year-old female status post tubal ligation with vaginal discharge Comparison exams: None Findings: Transabdominal imaging the pelvis. Patient discomfort with transvaginal imaging. Bladder is collapsed with Smith catheter in place. Endometrial thickness is measuring 10 mm. Uterus is midline. Size = 8.0 x 3.4 by transverse width of 4.9 cm.  Left ovarian size = 3.0 x 2.0 x 1.8 cm. Right ovarian size = 2.9 x 2.5 x 1.8 cm. Bilateral ovaries demonstrate normal homogeneous echotexture. Color flow and spectral analysis of bilateral ovarian arteries and veins demonstrates patency. Normal arterial and venous waveforms were documented. Impression:  No ovarian torsion Unremarkable transabdominal pelvic ultrasound Duplex Low Ext Arteries With Ladona Gist Result Date: 4/8/2019 History: Decreased pedal pulses on the left side. FINDINGS: Duplex Doppler ultrasound was performed of the lower extremities bilaterally. Mildly elevated velocity in the distal right superficial femoral artery. Patent anterior tibial and posterior tibial arteries on the right side. Turbulent flow in the left common femoral artery. Patent left profunda femoral artery. Elevated velocity in the left superficial femoral artery at the mid segment measuring 199 cm/s. Dampened waveform in the left popliteal artery. Patent left posterior tibial and anterior tibial arteries. Right ankle brachial index of 0.98. Left ankle brachial index of 0.96. Right toe index of 0.19. Dampened waveforms in the right toe. No waveform demonstrated in the left great toe. IMPRESSION: Evidence to suggest arterial occlusive disease in the small vessels of the left foot. Mild to moderate stenosis in the mid left superficial femoral artery. Turbulent flow in the left common femoral artery raises the possibility of atherosclerotic plaque or stenosis in the left external iliac artery. Ir Insert Non Tunl Cvc Over 5 Yrs Result Date: 4/8/2019 Title:  Temporary hemodialysis catheter placement. Ultrasound guidance for vascular access. Indication:  Acute renal failure. Consent: Informed written and oral consent was obtained from the patient after explanation of benefits and risks (including, but not limited to: Infection, hemorrhage, pneumothorax).   The patient's questions were answered to their satisfaction. The patient stated understanding and requested that we proceed. Procedure:  Maximal sterile barrier technique (including:  cap, mask, sterile gown, sterile gloves, sterile sheet, hand hygiene, chlorhexidene for antiseptic skin preparation, sterile ultrasound techniques including sterile gel and sterile ultrasound probe cover) was used. Following routine prep and drape of the right neck, a local field block with lidocaine was achieved. Ultrasound evaluation of all potential access sites was performed due to lack of a palpable vein. No veins were palpable due to overlying adipose tissue. Using real-time ultrasound guidance, with appropriate image recording and visualization of vascular needle entry, the patent right internal jugular vein was accessed using micropuncture technique. Using fluoroscopy, a triple lumen temporary hemodialysis catheter was advanced over the wire positioning the tip in the right atrium. A permanent image was recorded. All 3 lumens aspirated easily and were filled with heparinized saline. The catheter was secured with nonabsorbable suture. Sterile dressings were applied. Complications: None. Radiation Exposure Indices: Reference Air Kerma (Ka,r) = 1 mGy Dose Area Product/Kerma Area Product (DAP/RIAN/PKA) = 7.06 cGy-cm2 Fluoroscopy Exposure Time = 0.6 minutes/seconds Fluorographic Images = 1 Contrast: None Medications: Procedure was performed with local lidocaine. Findings:  Patent right internal jugular vein. Catheter tip in the mid right atrium. Impression:  Technically successful temporary hemodialysis catheter placement. Plan:  Catheter is ready for use. Assessment and Plan:  
 
Hospital Problems as of 4/8/2019 Date Reviewed: 4/7/2019 Codes Class Noted - Resolved POA Encephalopathy, toxic ICD-10-CM: G92 ICD-9-CM: 349.82  4/8/2019 - Present Unknown  Toxic metabolic encephalopathy [id number]: D19 
 ICD-9-CM: 349.82  4/8/2019 - Present Unknown Rhabdomyolysis ICD-10-CM: U49.88 ICD-9-CM: 728.88  4/7/2019 - Present Yes * (Principal) Sepsis ([place] [address] ICD-10-CM: A41.9 ICD-9-CM: 038.9, 995.91  4/6/2019 - Present Yes Hepatitis ICD-10-CM: K75.9 ICD-9-CM: 573.3  4/6/2019 - Present Yes Substance use disorder (Chronic) ICD-10-CM: F19.90 ICD-9-CM: 305.90  4/6/2019 - Present Yes Hip pain ICD-10-CM: M25.559 ICD-9-CM: 719.45  4/6/2019 - Present Yes Hyponatremia ICD-10-CM: E87.1 ICD-9-CM: 276.1  4/6/2019 - Present Yes CIRA (acute kidney injury) ([place] [address] ICD-10-CM: N17.9 ICD-9-CM: 584.9  4/6/2019 - Present Yes  
   
 UTI (urinary tract infection) ICD-10-CM: N39.0 ICD-9-CM: 599.0  4/6/2019 - Present Yes Hypertension ICD-10-CM: I10 
ICD-9-CM: 401.9  4/6/2019 - Present Yes Asthma (Chronic) ICD-10-CM: J45.909 ICD-9-CM: 493.90  5/19/2016 - Present Yes Overview Signed 4/7/2019  7:19 AM by GARRET Lucero Last Assessment & Plan:  
Albuterol prn Borderline personality disorder ([facility] (Chronic) ICD-10-CM: F60.3 ICD-9-CM: 301.83  5/19/2016 - Present Yes Overview Signed 4/7/2019  7:19 AM by GARRET Lucero Last Assessment & Plan:  
Continue Prozac, Seroquel, and trazodone PLAN:   
Sepsis- on vanc and zosyn- will d/c vancomycin. uti-  
cira- nephrology following- for temporary dialysis cath today Rhabdomyolysis/myositis-?etiology- cont ivf Elevated lfts- sec to rhabdomyolysis vs some sec to alcohol. Ct abd pelvis findings of PID vs ovarian cyst rupture- ordered pelvic ultrasound- couldn't do per vaginal sec to pain- transabdominal films- normal ovaries. Polysubstance abuse-alcohol,meth amphetamine-- will order thiamine and folic acid. Rt peroneal vein dvt- heparin drip Pad left leg- no vascular intervention at this point. Difficulty moving rt leg-?  Etiology- was able to do flexion at hip and knee on scratching the rt sole. 
htn Asthma Hyponatremia- resolved. Toxic metabolic encephalopathy sec to amphetamine etc- improving Advise on polysubstance abuse cessation. DC planning/Dispo: DVT ppx:  heparin Signed: 
Christen Enriquez MD

## 2019-04-08 NOTE — CONSULTS
Nephrology consult    Admission Date:  4/6/2019    Admission Diagnosis  Sepsis ([facility] [address] [A41.9]    We are asked by Dr. Joanie Machado  We are consulted for CIRA  History of Present Illness: Ms. Sharif Mosher is a 33 y.o. Female admitted with complaints of malaise and bilateral hip pain. PMH significant for polysubstance abuse, chronic back pain, seizure, bipolar disorder, and asthma. ER labs significant for amphetamines, benzodiazepines, and opiates on toxicology, CK >14,000, Cr 1.78-->4.13 today, UA + protein, Amorphous crystals 4+, RBC 20-50, moderate leukocyte esterase was placed on Rocephin, elevated LFTs. CT and CXR negative. Baseline Creatinine 0.85-1.1, abdominal US revealed bilateral normal size kidneys with no obstructive nephropathy, UOP last 24 hours 500 ml, not on ACEi or ARB. Patient seen and examined on rounds, friend at the bedside pt is drowsy and oriented reports feeling pain all over, denies SOB, CP, N/V/D, no fever/ chills. Reports she remembers going out side and being found by a friend and may have been unconscious for an unknown amount of time. Discussed dialysis and pt agrees if eminent.      Past Medical History:   Diagnosis Date    Asthma     Chronic back pain     Chronic lower back pain     Psychiatric disorder     bipolar    Seizures (HCC)       Past Surgical History:   Procedure Laterality Date    HX GYN      2 c-sec, tubal      Current Facility-Administered Medications   Medication Dose Route Frequency    doxycycline (VIBRAMYCIN) capsule 100 mg  100 mg Oral Q12H    Vancomycin Intermittent Dosing   Other Rx Dosing/Monitoring    heparin 25,000 units in dextrose 500 mL infusion  18-36 Units/kg/hr IntraVENous TITRATE    sodium chloride 0.9 % bolus infusion 100 mL  100 mL IntraVENous RAD ONCE    saline peripheral flush soln 10 mL  10 mL InterCATHeter RAD ONCE    iopamidol (ISOVUE-370) 76 % injection 100 mL  100 mL IntraVENous RAD ONCE    sodium chloride (NS) flush 5-10 mL  5-10 mL IntraVENous PRN  ondansetron (ZOFRAN) injection 4 mg  4 mg IntraVENous Q6H PRN    piperacillin-tazobactam (ZOSYN) 3.375 g in 0.9% sodium chloride (MBP/ADV) 100 mL  3.375 g IntraVENous Q8H    0.9% sodium chloride infusion  150 mL/hr IntraVENous CONTINUOUS    nicotine (NICODERM CQ) 21 mg/24 hr patch 1 Patch  1 Patch TransDERmal DAILY    LORazepam (ATIVAN) injection 1 mg  1 mg IntraVENous Q6H PRN    morphine injection 2 mg  2 mg IntraVENous Q4H PRN    naloxone (NARCAN) injection 0.4 mg  0.4 mg IntraVENous EVERY 2 MINUTES AS NEEDED     Allergies   Allergen Reactions    Dilantin [Phenytoin Sodium Extended] Itching    Keppra [Levetiracetam] Rash      Social History     Tobacco Use    Smoking status: Current Every Day Smoker     Packs/day: 1.00     Years: 1.00     Pack years: 1.00    Smokeless tobacco: Never Used   Substance Use Topics    Alcohol use: Yes     Comment: occassionally      History reviewed. No pertinent family history.      Review of Systems  Gen - no fever, no chills, appetite poor  HEENT - no sore throat, no decreased vision, no hearing loss  CV - no chest pain, no palpitation, no orthopnea  Lung - no shortness of breath, no cough, no hemoptysis  Abd - + tenderness, no nausea/vomiting, no bloody stool  Ext - no edema, no clubbing, no cyanosis  Musculoskeletal - no joint pain, no back pain  Neurologic - no headaches  Psychiatric - + anxiety, + depression  Skin - no rashes, no pupura  Genitourinary - + decreased urine output    Objective:     Vitals:    04/07/19 1549 04/07/19 1810 04/08/19 0328 04/08/19 0748   BP: (!) 147/106 (!) 142/97 120/78 140/70   Pulse: (!) 104 99 (!) 109 (!) 101   Resp: 20 20 16 16   Temp: 97.4 °F (36.3 °C) 97.9 °F (36.6 °C) 98.5 °F (36.9 °C) 98 °F (36.7 °C)   SpO2: 100% 100% 97% 99%   Weight:       Height:           Intake/Output Summary (Last 24 hours) at 4/8/2019 0837  Last data filed at 4/7/2019 2000  Gross per 24 hour   Intake 1218 ml   Output 50 ml   Net 1168 ml       Physical Exam  GEN :in no distress, alert and oriented  HEENT: anicteric sclerae, Oropharynx without lesions. Mucous membranes are moist.  Neck - supple without JVD,   No lymphadenopathy. CV - regular, rhythm tachycardia, no murmur, no rub  Lung - clear bilaterally, lungs expand symmetrically  Abd - soft, nontender, bowel sounds present, no hepatosplenomegaly  Ext - no clubbing, no cyanosis, no edema  Neurologic - mild asterixis   Genitourinary - Smith   Skin - no rashes, no purpura, no ecchymoses  Psychiatric: Normal mood and affect. Data Review:   Recent Labs     04/08/19  0757 04/07/19 2130 04/06/19 1925   WBC 17.0* 21.2* 23.8*   HGB 12.3 17.1* 17.3*   HCT 37.2 51.4* 52.2*   * 203 347     Recent Labs     04/07/19 2130 04/06/19 1925   * 131*   K 4.7 4.4    103   CO2 18* 17*   BUN 39* 22   CREA 4.13* 1.78*   GLU 82 194*   CA 7.4* 7.9*     No results for input(s): PH, PCO2, PO2, PCO2 in the last 72 hours. Problem List:     Patient Active Problem List    Diagnosis Date Noted    Rhabdomyolysis 04/07/2019    Constipation 04/07/2019    Sepsis ([facility] [address] 04/06/2019    Hepatitis 04/06/2019    Substance use disorder 04/06/2019    Hip pain 04/06/2019    Hyponatremia 04/06/2019    CIRA (acute kidney injury) ([facility] [address] 04/06/2019    UTI (urinary tract infection) 04/06/2019    Hypertension 04/06/2019    Asthma 05/19/2016    Borderline personality disorder ([facility] [address] 05/19/2016       Impression:    Plan:     1. CIRA 2/2 rhabdomyolysis in the setting of CK >14,000, positive UA   -Creatinine worsening 5.3,  last 24 hours, discussed dialysis and patient agrees if eminent.   -Continue aggressive IVF monitor renal function and UOP closely    2. Hyponatremia 2/2 #1    3. Sepsis with UTI  Urine cultures positive for E coli, BC NTD  - now on zosyn and vancomycin per hosp    4. Elevated LFTs  - HIV- non reactive,  hepatitis panel pending per hosp    5.  Polysubstance abuse   - discussed cessation  -prn ativan and nicotine patch per hosp     6.  Non occlusive SFA stenosis  -on heparin gtts- vascular following

## 2019-04-08 NOTE — PROGRESS NOTES
Spoke with Dr. Castillo Class with nephrology to confirm placement for PICC. Nephrology is on board.

## 2019-04-08 NOTE — PROGRESS NOTES
IV heparin rate has been adjusted based on the most recent PTT results. Lab Results Component Value Date/Time  
 aPTT 96.9 (H) 04/08/2019 07:57 AM  
 
No change to heparin rate. PTT at a therapeutic range. Will order ptt to be drawn at [phone] today.

## 2019-04-08 NOTE — CONSULTS
[address] [facility]  [phone]    History and Physical / Surgical Consultation   Kera Moyer    Admit date: 2019    MRN: [id number]     : 1990     Age: 33 y.o.          2019 11:06 AM    Subjective/HPI:   This patient is a 33 y.o. white female seen at the request of Prudence Kan MD and evaluated for diminished lower extremity pulses and abnormality on vascular imaging. PMH with polysubstance abuse (UDS +benzos, amphetamines, opiates), asthma and recent UTI, she presented to the ED 2019 with malaise and bilateral lower extremity pain and was admitted for sepsis. During further work-up, providers were unable to appreciate pedal pulses, arterial and venous duplexes were ordered and we were consulted. Today the patient reports generalized bilateral lower extremity pain x1wk, R>L. She states this prevents her from walking. She is a difficult historian, states re: B LE \"they just hurt all over,\" denies recent trauma. She is a daily cigarette smoker and denies injectable drug use in lower extremities. Venous duplex with thrombosed right peroneal vein, no thrombus seen above knee. Arterial duplex with mild-moderate stenosis of mid left SFA and question of plaque / stenosis in left external iliac artery. Patient's past medical, surgical, family and social histories were reviewed as noted here and below. Review of Systems  A comprehensive review of systems was negative except for that written in the HPI.     Past Medical History:   Diagnosis Date    Asthma     Chronic back pain     Chronic lower back pain     Psychiatric disorder     bipolar    Seizures (HCC)       Past Surgical History:   Procedure Laterality Date    HX GYN      2 c-sec, tubal      Allergies   Allergen Reactions    Dilantin [Phenytoin Sodium Extended] Itching    Keppra [Levetiracetam] Rash      Social History     Tobacco Use    Smoking status: Current Every Day Smoker     Packs/day: 1.00     Years: 1.00     Pack years: 1.00    Smokeless tobacco: Never Used   Substance Use Topics    Alcohol use: Yes     Comment: occassionally      History reviewed. No pertinent family history.      None     Current Facility-Administered Medications   Medication Dose Route Frequency    doxycycline (VIBRAMYCIN) capsule 100 mg  100 mg Oral Q12H    piperacillin-tazobactam (ZOSYN) 3.375 g in 0.9% sodium chloride (MBP/ADV) 100 mL  3.375 g IntraVENous Q12H    Vancomycin Intermittent Dosing   Other Rx Dosing/Monitoring    heparin 25,000 units in dextrose 500 mL infusion  18-36 Units/kg/hr IntraVENous TITRATE    sodium chloride (NS) flush 5-10 mL  5-10 mL IntraVENous PRN    ondansetron (ZOFRAN) injection 4 mg  4 mg IntraVENous Q6H PRN    0.9% sodium chloride infusion  150 mL/hr IntraVENous CONTINUOUS    nicotine (NICODERM CQ) 21 mg/24 hr patch 1 Patch  1 Patch TransDERmal DAILY    LORazepam (ATIVAN) injection 1 mg  1 mg IntraVENous Q6H PRN    morphine injection 2 mg  2 mg IntraVENous Q4H PRN    naloxone (NARCAN) injection 0.4 mg  0.4 mg IntraVENous EVERY 2 MINUTES AS NEEDED     Objective:     Vitals:    04/07/19 1549 04/07/19 1810 04/08/19 0328 04/08/19 0748   BP: (!) 147/106 (!) 142/97 120/78 140/70   Pulse: (!) 104 99 (!) 109 (!) 101   Resp: 20 20 16 16   Temp: 97.4 °F (36.3 °C) 97.9 °F (36.6 °C) 98.5 °F (36.9 °C) 98 °F (36.7 °C)   SpO2: 100% 100% 97% 99%   Weight:       Height:           Physical Exam:   General well-developed, well-nourished, dozes during exam, in no acute distress  Skin  warm and moist with good texture and good turgor; no jaundice or rashes  HEENT normocephalic without lesions to the scalp, extraocular muscles intact, nares patent, mouth with adequate dentition, oral mucosa moist  Neck  supple without JVD or bruits; trachea midline  Lungs  respirations unlabored, lungs clear to auscultation bilaterally  Heart  regular rate and rhythm, no appreciable murmurs  Abdomen soft, slightly protuberant but non-distended, mild generalized tenderness, worse at [facility]; bowel sounds normoactive  Extremities warm and pink without cyanosis; sensorimotor intact, feet without lesions / wounds / ulcerations  Pulses  2+ palpable and symmetric at radial, brachial, and posterior tibialis; 1+ palpable at bilateral dorsalis pedis, with right fainter than left, 1+ palpable at bilateral femoral  Neurological alert and oriented; no gross sensorimotor deficits     Data Review   Recent Labs     04/08/19 0757 04/07/19 2130 04/06/19 1925   WBC 17.0* 21.2* 23.8*   HGB 12.3 17.1* 17.3*   HCT 37.2 51.4* 52.2*   * 203 347     Recent Labs     04/08/19 0757 04/08/19  0031 04/07/19 2130 04/06/19 1925   *  --  134* 131*   K 4.5  --  4.7 4.4     --  105 103   CO2 14*  --  18* 17*   GLU 84  --  82 194*   BUN 44*  --  39* 22   CREA 5.31*  --  4.13* 1.78*   INR  --  1.0  --   --        Imaging  Bilateral lower extremity venous ultrasound, 4/7/2019  INDICATION:  Pain and swelling,  FINDINGS:  There is normal flow in the common femoral, superficial femoral, and  popliteal veins. Normal compression and augmentation is demonstrated. There is normal flow in the left calf veins. In the right calf, the peroneal  vein is thrombosed. IMPRESSION:  1. No evidence of DVT in the left leg. 2.  Thrombosis of the right peroneal vein. No thrombus is seen about the knee         Duplex Doppler ultrasound was performed of the lower extremities bilaterally, 4/7/2019  Mildly elevated velocity in the distal right superficial femoral artery. Patent  anterior tibial and posterior tibial arteries on the right side. History: Decreased pedal pulses on the left side. FINDINGS:  Turbulent flow in the left common femoral artery. Patent left profunda femoral  artery. Elevated velocity in the left superficial femoral artery at the mid  segment measuring 199 cm/s.  Dampened waveform in the left popliteal artery. Patent left posterior tibial and anterior tibial arteries. Right ankle brachial index of 0.98. Left ankle brachial index of 0.96. Right toe  index of 0.19. Dampened waveforms in the right toe. No waveform demonstrated in the left great toe. IMPRESSION:  Evidence to suggest arterial occlusive disease in the small vessels of the left  foot. Mild to moderate stenosis in the mid left superficial femoral artery. Turbulent flow in the left common femoral artery raises the possibility of  atherosclerotic plaque or stenosis in the left external iliac artery.       Assessment / Plan / Recommendations / Medical Decision Making:     Hospital Problems  Date Reviewed: 4/7/2019          Codes Class Noted POA    Rhabdomyolysis ICD-10-CM: M62.82  ICD-9-CM: 728.88  4/7/2019 Yes        * (Principal) Sepsis ([facility] ICD-10-CM: A41.9  ICD-9-CM: 038.9, 995.91  4/6/2019 Yes        Hepatitis ICD-10-CM: K75.9  ICD-9-CM: 573.3  4/6/2019 Yes        Substance use disorder (Chronic) ICD-10-CM: F19.90  ICD-9-CM: 305.90  4/6/2019 Yes        Hip pain ICD-10-CM: M25.559  ICD-9-CM: 719.45  4/6/2019 Yes        Hyponatremia ICD-10-CM: E87.1  ICD-9-CM: 276.1  4/6/2019 Yes        CIRA (acute kidney injury) ([facility] ICD-10-CM: N17.9  ICD-9-CM: 584.9  4/6/2019 Yes        UTI (urinary tract infection) ICD-10-CM: N39.0  ICD-9-CM: 599.0  4/6/2019 Yes        Hypertension ICD-10-CM: I10  ICD-9-CM: 401.9  4/6/2019 Yes        Asthma (Chronic) ICD-10-CM: J45.909  ICD-9-CM: 493.90  5/19/2016 Yes    Overview Signed 4/7/2019  7:19 AM by GARRET Bermeo     Last Assessment & Plan:   Albuterol prn             Borderline personality disorder ([facility] (Chronic) ICD-10-CM: F60.3  ICD-9-CM: 301.83  5/19/2016 Yes    Overview Signed 4/7/2019  7:19 AM by GARRET Bermeo     Last Assessment & Plan:   Continue Prozac, Seroquel, and trazodone                   Patient Active Problem List    Diagnosis Date Noted    Rhabdomyolysis 04/07/2019    Constipation 04/07/2019  Sepsis ([facility] 04/06/2019    Hepatitis 04/06/2019    Substance use disorder 04/06/2019    Hip pain 04/06/2019    Hyponatremia 04/06/2019    CIRA (acute kidney injury) ([facility] 04/06/2019    UTI (urinary tract infection) 04/06/2019    Hypertension 04/06/2019    Asthma 05/19/2016    Borderline personality disorder ([facility] 05/19/2016         Jesus Fong is a 33 y.o. female smoker with bilateral lower extremity pain and mild disease in left superficial femoral artery. Patient with palpable pedal pulses to my exam and normal ABIs; vascular surgeon on-call, Nidhi Sarabia MD will review imaging and determine further plan. Thank you very much for this referral. We appreciate the opportunity to participate in this patient's care. We will follow along with above stated plan. Henrique Chua PA-C  Physician Assistant with [facility]  [employer].  Shirley Daly MD / Victor M Anderson MD

## 2019-04-08 NOTE — PROGRESS NOTES
END OF SHIFT NOTE: 
 
INTAKE/OUTPUT 
04/07 0701 - 04/08 0700 In: 1218 [I.V.:1218] Out: 50 [Urine:50] Voiding: NO 
Catheter: YES Drain:   
 
 
 
 
 
Flatus: Patient does have flatus present. Stool:  0 occurrences. Characteristics: 
  
Emesis: 0 occurrences. Characteristics: VITAL SIGNS Patient Vitals for the past 12 hrs: 
 Temp Pulse Resp BP SpO2  
04/08/19 0328 98.5 °F (36.9 °C) (!) 109 16 120/78 97 % Pain Assessment Pain Intensity 1: 0 (04/07/19 2006) Pain Location 1: Buttocks, Leg 
Pain Intervention(s) 1: Medication (see MAR) Patient Stated Pain Goal: 0 Ambulating No 
 
Shift report given to oncoming nurse at the bedside.  
 
Medhat Woodward RN

## 2019-04-08 NOTE — PROCEDURES
.Anesthesia: Local, Moderate sedation    Findings: Please see dictated radiology report    Estimated Blood Loss: None    Tubes/Lines: 12F triple lumen HD catheter placement RT IJ    Specimen:  None    Complications: None    Disposition:  Stable to recovery

## 2019-04-08 NOTE — CDMP QUERY
Patient admitted with sepsis/UTI, Encephalopathy documented. Please further specify type of Encephalopathy in the medical record ? Alcoholic Encephalopathy ? Metabolic Encephalopathy ? Septic Encephalopathy ? Toxic Encephalopathy 
? Encephalopathy due to medications or drugs (please specify) ? Toxic Metabolic Encephalopathy 
? Other Encephalopathy 
? Other, please specify ? Clinically unable to determine The medical record reflects the following: 
   Risk Factors: Polysubstance abuse, Sepsis/UTI, Rhabdo, Hyponatremia Clinical Indicators: UDS +amphetamines/benzo/opiates, admitted to ETOH and meth prior to admission, UA w/trace bacteria, Agitation and AMS on admit per MD, Na 131 Treatment: Treating underlying infection with NS @ 150 ml/hr and 2L bolus, and IV abx. Ativan Thanks, Tara Payne, BSN, RN, CDS Clinical Documentation Improvement 
[phone]

## 2019-04-08 NOTE — PROGRESS NOTES
Came by to placed PICC line. Patient in IR for procedure. Primary RN asked to call VAT when patient returns to room.   Ludie Claude, RN, VAT

## 2019-04-08 NOTE — PROGRESS NOTES
CT with pelvic fat fluid- possible PID in differential, will add oral doxycycline Catarino Amador MD

## 2019-04-08 NOTE — PROGRESS NOTES
Asked to evaluate due to oliguria despite high rate IVF, cold/painful LLE without ability to doppler pulse, has new right peroneal DVT, MRI Lspine shows presacral edema and recommending CT pelvis. Unable to obtain labs due to difficult stick- has rhabdo and CIRA without followup labs done today. At bedside patient more alert than on admit, has diffuse pain, states left foot painful, unable to palpate left DP pulse with some dorsal foot bruising, has decreased palpable RLE DP pulse, abdomen tender to palpation, boyle placed with return of cristina urine. Will proceed with LE arterial studies, add IV heparin drip for RLE DVT, CT AP with oral contrast, asked Claudeen Hollering in lab to attempt stick for followup labs ordered, needs PICC if renal function improved, clear liquid diet Elizabeth Mills MD

## 2019-04-08 NOTE — PROGRESS NOTES
TRANSFER - OUT REPORT: 
 
Verbal report given to [facility] RN(name) on Wendi Barr  being transferred to [facility](unit) for routine progression of care Report consisted of patients Situation, Background, Assessment and  
Recommendations(SBAR). Information from the following report(s) SBAR, Kardex, Procedure Summary, Intake/Output and MAR was reviewed with the receiving nurse. Lines:  
Peripheral IV 04/06/19 Right Wrist (Active) Site Assessment Clean, dry, & intact 4/8/2019  7:30 AM  
Phlebitis Assessment 0 4/8/2019  7:30 AM  
Infiltration Assessment 0 4/8/2019  7:30 AM  
Dressing Status Clean, dry, & intact 4/8/2019  7:30 AM  
Dressing Type Transparent 4/8/2019  7:30 AM  
Hub Color/Line Status Capped 4/8/2019  7:30 AM  
Alcohol Cap Used No 4/6/2019  7:06 PM  
   
Peripheral IV 04/06/19 Right Antecubital (Active) Site Assessment Clean, dry, & intact 4/8/2019  7:30 AM  
Phlebitis Assessment 0 4/8/2019  7:30 AM  
Infiltration Assessment 0 4/8/2019  7:30 AM  
Dressing Status Clean, dry, & intact 4/8/2019  7:30 AM  
Dressing Type Transparent 4/8/2019  7:30 AM  
Hub Color/Line Status Infusing 4/8/2019  7:30 AM  
   
Peripheral IV 04/08/19 Left Antecubital (Active) Site Assessment Clean, dry, & intact 4/8/2019  7:30 AM  
Phlebitis Assessment 0 4/8/2019  7:30 AM  
Infiltration Assessment 0 4/8/2019  7:30 AM  
Dressing Status Clean, dry, & intact 4/8/2019  7:30 AM  
Dressing Type Transparent 4/8/2019  7:30 AM  
  
 
Opportunity for questions and clarification was provided. Patient transported with: 
 [employer]

## 2019-04-08 NOTE — CONSULTS
Patient seen and examined. Full consult to follow. Patient admitted for sepsis. Consult for ultrasound findings of mild narrowing the left SFA. Clinically patient has palpable femoral pulses and palpable pedal pulses. No evidence of any ulcers or wounds. Review ultrasound images show mild disease in the left SFA. She does have a history of smoking. With palpable pedal pulses and ABIs showing very 0.9 no  for any further workup. We will sign off please call with questions or concerns.     Claudia Ellis

## 2019-04-08 NOTE — PROGRESS NOTES
Came back to patients room to place PICC. Patient in U/S at this time. Primary RN to call VAT when she returns to the unit.   Miryam Moran RN, VAT

## 2019-04-09 LAB
ALBUMIN SERPL-MCNC: 1.6 G/DL (ref 3.5–5)
ALBUMIN/GLOB SERPL: 0.6 {RATIO} (ref 1.2–3.5)
ALP SERPL-CCNC: 31 U/L (ref 50–136)
ALT SERPL-CCNC: 271 U/L (ref 12–65)
ANION GAP SERPL CALC-SCNC: 14 MMOL/L (ref 7–16)
APTT PPP: 103.3 SEC (ref 24.7–39.8)
APTT PPP: 115.9 SEC (ref 24.7–39.8)
AST SERPL-CCNC: 602 U/L (ref 15–37)
BACTERIA SPEC CULT: NORMAL
BASOPHILS # BLD: 0.1 K/UL (ref 0–0.2)
BASOPHILS NFR BLD: 0 % (ref 0–2)
BILIRUB SERPL-MCNC: 0.4 MG/DL (ref 0.2–1.1)
BUN SERPL-MCNC: 53 MG/DL (ref 6–23)
CALCIUM SERPL-MCNC: 6.8 MG/DL (ref 8.3–10.4)
CHLORIDE SERPL-SCNC: 108 MMOL/L (ref 98–107)
CK SERPL-CCNC: ABNORMAL U/L (ref 21–215)
CO2 SERPL-SCNC: 12 MMOL/L (ref 21–32)
CREAT SERPL-MCNC: 6.97 MG/DL (ref 0.6–1)
DIFFERENTIAL METHOD BLD: ABNORMAL
EOSINOPHIL # BLD: 0.1 K/UL (ref 0–0.8)
EOSINOPHIL NFR BLD: 1 % (ref 0.5–7.8)
ERYTHROCYTE [DISTWIDTH] IN BLOOD BY AUTOMATED COUNT: 13.8 % (ref 11.9–14.6)
GLOBULIN SER CALC-MCNC: 2.8 G/DL (ref 2.3–3.5)
GLUCOSE SERPL-MCNC: 82 MG/DL (ref 65–100)
HAV IGM SERPL QL IA: NEGATIVE
HBV CORE IGM SERPL QL IA: NEGATIVE
HBV SURFACE AG SERPL QL IA: NEGATIVE
HCT VFR BLD AUTO: 34.3 % (ref 35.8–46.3)
HCV AB S/CO SERPL IA: <0.1 S/CO RATIO (ref 0–0.9)
HGB BLD-MCNC: 11.2 G/DL (ref 11.7–15.4)
IMM GRANULOCYTES # BLD AUTO: 0.1 K/UL (ref 0–0.5)
IMM GRANULOCYTES NFR BLD AUTO: 1 % (ref 0–5)
LYMPHOCYTES # BLD: 1.7 K/UL (ref 0.5–4.6)
LYMPHOCYTES NFR BLD: 14 % (ref 13–44)
MCH RBC QN AUTO: 30.4 PG (ref 26.1–32.9)
MCHC RBC AUTO-ENTMCNC: 32.7 G/DL (ref 31.4–35)
MCV RBC AUTO: 93 FL (ref 79.6–97.8)
MONOCYTES # BLD: 0.7 K/UL (ref 0.1–1.3)
MONOCYTES NFR BLD: 6 % (ref 4–12)
NEUTS SEG # BLD: 9.7 K/UL (ref 1.7–8.2)
NEUTS SEG NFR BLD: 79 % (ref 43–78)
NRBC # BLD: 0 K/UL (ref 0–0.2)
PLATELET # BLD AUTO: 140 K/UL (ref 150–450)
PMV BLD AUTO: 10.7 FL (ref 9.4–12.3)
POTASSIUM SERPL-SCNC: 4.1 MMOL/L (ref 3.5–5.1)
PROT SERPL-MCNC: 4.4 G/DL (ref 6.3–8.2)
RBC # BLD AUTO: 3.69 M/UL (ref 4.05–5.2)
SERVICE CMNT-IMP: NORMAL
SODIUM SERPL-SCNC: 134 MMOL/L (ref 136–145)
WBC # BLD AUTO: 12.3 K/UL (ref 4.3–11.1)

## 2019-04-09 PROCEDURE — 77030020263 HC SOL INJ SOD CL0.9% LFCR 1000ML

## 2019-04-09 PROCEDURE — 74011250636 HC RX REV CODE- 250/636: Performed by: INTERNAL MEDICINE

## 2019-04-09 PROCEDURE — 74011250637 HC RX REV CODE- 250/637: Performed by: NURSE PRACTITIONER

## 2019-04-09 PROCEDURE — 80053 COMPREHEN METABOLIC PANEL: CPT

## 2019-04-09 PROCEDURE — 5A1D70Z PERFORMANCE OF URINARY FILTRATION, INTERMITTENT, LESS THAN 6 HOURS PER DAY: ICD-10-PCS | Performed by: INTERNAL MEDICINE

## 2019-04-09 PROCEDURE — 65270000029 HC RM PRIVATE

## 2019-04-09 PROCEDURE — 90935 HEMODIALYSIS ONE EVALUATION: CPT

## 2019-04-09 PROCEDURE — 82550 ASSAY OF CK (CPK): CPT

## 2019-04-09 PROCEDURE — 74011250637 HC RX REV CODE- 250/637: Performed by: INTERNAL MEDICINE

## 2019-04-09 PROCEDURE — 36415 COLL VENOUS BLD VENIPUNCTURE: CPT

## 2019-04-09 PROCEDURE — 85025 COMPLETE CBC W/AUTO DIFF WBC: CPT

## 2019-04-09 PROCEDURE — 74011000258 HC RX REV CODE- 258: Performed by: INTERNAL MEDICINE

## 2019-04-09 PROCEDURE — 85730 THROMBOPLASTIN TIME PARTIAL: CPT

## 2019-04-09 PROCEDURE — 74011250637 HC RX REV CODE- 250/637: Performed by: FAMILY MEDICINE

## 2019-04-09 PROCEDURE — 65660000000 HC RM CCU STEPDOWN

## 2019-04-09 PROCEDURE — 87491 CHLMYD TRACH DNA AMP PROBE: CPT

## 2019-04-09 RX ORDER — FLUCONAZOLE 100 MG/1
200 TABLET ORAL ONCE
Status: COMPLETED | OUTPATIENT
Start: 2019-04-09 | End: 2019-04-09

## 2019-04-09 RX ORDER — ALBUTEROL SULFATE 90 UG/1
2 AEROSOL, METERED RESPIRATORY (INHALATION)
COMMUNITY

## 2019-04-09 RX ADMIN — DOXYCYCLINE HYCLATE 100 MG: 100 CAPSULE ORAL at 14:44

## 2019-04-09 RX ADMIN — SODIUM CHLORIDE 100 ML/HR: 900 INJECTION, SOLUTION INTRAVENOUS at 18:56

## 2019-04-09 RX ADMIN — MORPHINE SULFATE 2 MG: 2 INJECTION, SOLUTION INTRAMUSCULAR; INTRAVENOUS at 08:04

## 2019-04-09 RX ADMIN — MORPHINE SULFATE 2 MG: 2 INJECTION, SOLUTION INTRAMUSCULAR; INTRAVENOUS at 15:54

## 2019-04-09 RX ADMIN — MORPHINE SULFATE 2 MG: 2 INJECTION, SOLUTION INTRAMUSCULAR; INTRAVENOUS at 03:28

## 2019-04-09 RX ADMIN — Medication 100 MG: at 14:44

## 2019-04-09 RX ADMIN — HEPARIN SODIUM 20 UNITS/KG/HR: 5000 INJECTION, SOLUTION INTRAVENOUS at 07:27

## 2019-04-09 RX ADMIN — HEPARIN SODIUM 20 UNITS/KG/HR: 5000 INJECTION, SOLUTION INTRAVENOUS at 18:56

## 2019-04-09 RX ADMIN — MORPHINE SULFATE 2 MG: 2 INJECTION, SOLUTION INTRAMUSCULAR; INTRAVENOUS at 12:19

## 2019-04-09 RX ADMIN — SODIUM CHLORIDE 150 ML/HR: 900 INJECTION, SOLUTION INTRAVENOUS at 05:55

## 2019-04-09 RX ADMIN — LORAZEPAM 1 MG: 2 INJECTION INTRAMUSCULAR; INTRAVENOUS at 18:56

## 2019-04-09 RX ADMIN — FLUCONAZOLE 200 MG: 100 TABLET ORAL at 18:09

## 2019-04-09 RX ADMIN — FOLIC ACID 1 MG: 1 TABLET ORAL at 14:44

## 2019-04-09 RX ADMIN — MORPHINE SULFATE 2 MG: 2 INJECTION, SOLUTION INTRAMUSCULAR; INTRAVENOUS at 20:32

## 2019-04-09 RX ADMIN — PIPERACILLIN AND TAZOBACTAM 3.38 G: 3; .375 INJECTION, POWDER, FOR SOLUTION INTRAVENOUS at 05:56

## 2019-04-09 NOTE — PROGRESS NOTES
Hospitalist Progress Note 2019 Admit Date: 2019  4:59 PM  
NAME: Migel Coleman :  1990 DOS:              19 MRN:  [id number] Attending: Pino Huynh MD 
PCP:  None Treatment Team: Attending Provider: Susana Trujillo MD; Consulting Provider: Janas Holstein, MD; Care Manager: Brina Cosme RN; Consulting Provider: Lydia Guy MD 
 
Full Code SUBJECTIVE: As previously documented: 32 yo female with PMH of polysubstance use who is evaluated with malaise and bilateral hip pain. She has UDS showing amphetamines, benzo, opiates. In the ED patient was found to have UA (+) for UTI, CIRA ,rhabdo and elevated LFTs. CT brain and CXR with no acute disease. Patient also was found to have a R peroneal vein thrombosis on US, started on heparin drip. Vascular evaluated patient for suspected stenosis of L external iliac artery with no acute intervention recommended. Nephrology following due to CIRA. 19    Lena Hernandez stated still having pain all over her body. 10+ ROS reviewed and negative except for positive in HPI. Allergies Allergen Reactions  Dilantin [Phenytoin Sodium Extended] Itching  Keppra [Levetiracetam] Rash Current Facility-Administered Medications Medication Dose Route Frequency  doxycycline (VIBRAMYCIN) capsule 100 mg  100 mg Oral Q12H  piperacillin-tazobactam (ZOSYN) 3.375 g in 0.9% sodium chloride (MBP/ADV) 100 mL  3.375 g IntraVENous Q12H  thiamine HCL (B-1) tablet 100 mg  100 mg Oral DAILY  folic acid (FOLVITE) tablet 1 mg  1 mg Oral DAILY  heparin 25,000 units in dextrose 500 mL infusion  18-36 Units/kg/hr IntraVENous TITRATE  sodium chloride (NS) flush 5-10 mL  5-10 mL IntraVENous PRN  
 ondansetron (ZOFRAN) injection 4 mg  4 mg IntraVENous Q6H PRN  
 0.9% sodium chloride infusion  150 mL/hr IntraVENous CONTINUOUS  
 nicotine (NICODERM CQ) 21 mg/24 hr patch 1 Patch  1 Patch TransDERmal DAILY  LORazepam (ATIVAN) injection 1 mg  1 mg IntraVENous Q6H PRN  
 morphine injection 2 mg  2 mg IntraVENous Q4H PRN  
 naloxone (NARCAN) injection 0.4 mg  0.4 mg IntraVENous EVERY 2 MINUTES AS NEEDED There is no immunization history on file for this patient. Objective:  
 
Patient Vitals for the past 24 hrs: 
 Temp Pulse Resp BP SpO2  
19 1201  89  (!) 125/95   
19 1128  93  148/89   
19 1057  (!) 101  (!) 145/91   
19 1027  95 16 144/89   
19 0715 97.8 °F (36.6 °C) 96 16 136/86 98 % 19 0408 98.2 °F (36.8 °C) (!) 104 18 129/87 96 % 19 0052 97.8 °F (36.6 °C) 65 18 123/64 97 % 19 0004 98.5 °F (36.9 °C) (!) 103  128/84 96 % 19 1932 97.6 °F (36.4 °C) 98 19 (!) 143/93 97 % 19 1515 97.4 °F (36.3 °C) 98 18 150/88 99 % 19 1315 98 °F (36.7 °C) (!) 105 16 149/89 98 % Temp (24hrs), Av.9 °F (36.6 °C), Min:97.4 °F (36.3 °C), Max:98.5 °F (36.9 °C) Oxygen Therapy O2 Sat (%): 98 % (19) Pulse via Oximetry: 127 beats per minute (19) O2 Device: Room air (19 1500) Oxygen Therapy O2 Sat (%): 98 % (19) Pulse via Oximetry: 127 beats per minute (19) O2 Device: Room air (19 1500) Physical Exam: 
General:         Alert, cooperative, no distress HEENT:               NCAT. No obvious deformity. Lungs: No wheezing/rhonchi/rales Cardiovascular:   RRR. No m/r/g. No pedal edema b/l. Abdomen:       S/nt/nd. Bowel sounds normal. .  
Skin:         Diffused generalized pain Neurologic:    No gross focal deficit. Psychiatric:         Good mood. Normal affect. DIAGNOSTIC STUDIES Data Review:  
Recent Results (from the past 24 hour(s)) PTT Collection Time: 19 10:04 PM  
Result Value Ref Range aPTT 63.0 (H) 24.7 - 39.8 SEC  
CK Collection Time: 19  5:50 AM  
Result Value Ref Range  CK 11,557 (H) 21 - 215 U/L  
 CBC WITH AUTOMATED DIFF Collection Time: 04/09/19  5:50 AM  
Result Value Ref Range WBC 12.3 (H) 4.3 - 11.1 K/uL  
 RBC 3.69 (L) 4.05 - 5.2 M/uL  
 HGB 11.2 (L) 11.7 - 15.4 g/dL HCT 34.3 (L) 35.8 - 46.3 % MCV 93.0 79.6 - 97.8 FL  
 MCH 30.4 26.1 - 32.9 PG  
 MCHC 32.7 31.4 - 35.0 g/dL  
 RDW 13.8 11.9 - 14.6 % PLATELET 608 (L) 082 - 450 K/uL MPV 10.7 9.4 - 12.3 FL ABSOLUTE NRBC 0.00 0.0 - 0.2 K/uL  
 DF AUTOMATED NEUTROPHILS 79 (H) 43 - 78 % LYMPHOCYTES 14 13 - 44 % MONOCYTES 6 4.0 - 12.0 % EOSINOPHILS 1 0.5 - 7.8 % BASOPHILS 0 0.0 - 2.0 % IMMATURE GRANULOCYTES 1 0.0 - 5.0 %  
 ABS. NEUTROPHILS 9.7 (H) 1.7 - 8.2 K/UL  
 ABS. LYMPHOCYTES 1.7 0.5 - 4.6 K/UL  
 ABS. MONOCYTES 0.7 0.1 - 1.3 K/UL  
 ABS. EOSINOPHILS 0.1 0.0 - 0.8 K/UL  
 ABS. BASOPHILS 0.1 0.0 - 0.2 K/UL  
 ABS. IMM. GRANS. 0.1 0.0 - 0.5 K/UL METABOLIC PANEL, COMPREHENSIVE Collection Time: 04/09/19  5:50 AM  
Result Value Ref Range Sodium 134 (L) 136 - 145 mmol/L Potassium 4.1 3.5 - 5.1 mmol/L Chloride 108 (H) 98 - 107 mmol/L  
 CO2 12 (L) 21 - 32 mmol/L Anion gap 14 7 - 16 mmol/L Glucose 82 65 - 100 mg/dL BUN 53 (H) 6 - 23 MG/DL Creatinine 6.97 (H) 0.6 - 1.0 MG/DL  
 GFR est AA 9 (L) >60 ml/min/1.73m2 GFR est non-AA 7 (L) >60 ml/min/1.73m2 Calcium 6.8 (L) 8.3 - 10.4 MG/DL Bilirubin, total 0.4 0.2 - 1.1 MG/DL  
 ALT (SGPT) 271 (H) 12 - 65 U/L  
 AST (SGOT) 602 (H) 15 - 37 U/L Alk. phosphatase 31 (L) 50 - 136 U/L Protein, total 4.4 (L) 6.3 - 8.2 g/dL Albumin 1.6 (L) 3.5 - 5.0 g/dL Globulin 2.8 2.3 - 3.5 g/dL A-G Ratio 0.6 (L) 1.2 - 3.5 PTT Collection Time: 04/09/19  5:50 AM  
Result Value Ref Range aPTT 115.9 (H) 24.7 - 39.8 SEC All Micro Results Procedure Component Value Units Date/Time CULTURE, BLOOD [[id number]] Collected:  04/07/19 2130 Order Status:  Completed Specimen:  Blood Updated:  04/09/19 7685   Special Requests: --     
 LEFT 
ARM Culture result: NO GROWTH 1 DAY     
 CULTURE, BLOOD [[id number]] Collected:  04/06/19 2032 Order Status:  Completed Specimen:  Blood Updated:  04/09/19 0725 Special Requests: --     
  LEFT 
HAND Culture result: NO GROWTH 3 DAYS     
 CULTURE, URINE [[id number]] Collected:  04/06/19 1857 Order Status:  Completed Specimen:  Cath Urine Updated:  04/09/19 1945 Special Requests: NO SPECIAL REQUESTS Culture result: NO GROWTH 2 DAYS     
 CULTURE, URINE [[id number]] Collected:  04/06/19 2145 Order Status:  Canceled Specimen:  Urine from Clean catch CULTURE, BLOOD [[id number]] Order Status:  Sent Specimen:  Blood Imaging Lavenia Yareli /Studies: CXR Results  (Last 48 hours) None CT Results  (Last 48 hours) 04/07/19 2348  CT ABD PELV WO CONT Final result Impression:  IMPRESSION:  
1. New fluid and stranding throughout the pelvic fat, of unclear etiology. Differential considerations would include pelvic inflammatory disease or the  
sequela of a ruptured ovarian cyst.  
2. There are also new edematous changes in the subcutaneous tissue of the pelvis  
and bilateral buttock muscles. Correlate for myositis. Narrative:  EXAM: CT abdomen and pelvis without IV contrast.  
   
INDICATION: Pain. COMPARISON: Prior CT abdomen and pelvis on March 28, 2019. TECHNIQUE: Axial CT images of abdomen and pelvis were obtained after oral  
contrast. No IV contrast was administered. Radiation dose reduction techniques  
were used for this study. Our CT scanners use one or all of the following: Automated exposure control, adjustment of the mA and/or kV according to patient  
size, iterative reconstruction. FINDINGS: There is new fluid and stranding throughout the pelvic fat. No focal  
abscess or associated gas is identified. The urinary bladder is decompressed around a Smith catheter. The uterus is present. The ovaries are grossly normal.   
   
 The gallbladder is mildly distended, but otherwise normal in appearance. No  
calcified gallstones are identified. The liver, pancreas, spleen, adrenal  
glands, kidneys, bowel, appendix and abdominal aorta are within normal limits. The lung bases are clear. The bony structures are unremarkable. New edematous  
changes are also noted in the subcutaneous tissue of the pelvis and bilateral  
buttock muscles, without associated gas. [address] [place] Result Date: 2019 Impression:  No ovarian torsion Unremarkable transabdominal pelvic ultrasound Ir Insert Non Tunl Cvc Over 5 Yrs Result Date: 2019 Impression:  Technically successful temporary hemodialysis catheter placement. Plan:  Catheter is ready for use. Results for orders placed or performed during the hospital encounter of 19  
2D ECHO COMPLETE ADULT (TTE) W OR WO CONTR Narrative [facility] [address] [place] [address] Dr Lucero, [address] [facility] 
[phone] Transthoracic Echocardiogram 
2D, M-mode, Doppler, and Color Doppler Patient: Adriano Wiseman MR #: [id number] : 1990 Age: 33 years Gender: Female Study date: 2019 Account #: [de-identified] Height: 59 in 
Weight: 124.7 lb 
BSA: 1.51 mï¾² Status:Routine Location: 205 BP: 120/ 78 Allergies: PHENYTOIN SODIUM EXTENDED, LEVETIRACETAM 
 
Sonographer:  Norma Cardenas [facility] Group:  [facility] Referring Physician:  Rohini Patterson. Ana Silva MD 
Reading Physician:  Dheeraj Hernandez. Fred Ireland MD [facility] INDICATIONS: PVD. *Per patient- unable to turn onto left side due to blood clot in leg. * PROCEDURE: This was a routine study. A transthoracic echocardiogram was 
performed. The study included complete 2D imaging, M-mode, complete spectral 
Doppler, and color Doppler. Echocardiographic views were limited by  
restricted patient mobility and poor acoustic window availability. Image quality was 
adequate. LEFT VENTRICLE: Size was normal. Systolic function was normal. Ejection 
fraction was estimated in the range of 55 % to 60 %. There were no regional 
wall motion abnormalities. Wall thickness was normal. The E/e' ratio was 6.2. Left ventricular diastolic function parameters were normal. 
 
RIGHT VENTRICLE: The size was normal. Systolic function was normal. The 
tricuspid jet envelope definition was inadequate for estimation of RV  
systolic 
pressure. LEFT ATRIUM: Size was normal. 
 
RIGHT ATRIUM: Size was normal. 
 
SYSTEMIC VEINS: IVC: The inferior vena cava was normal in size and course. AORTIC VALVE: The valve was structurally normal, tri-commissural. There was  
no 
evidence for stenosis. There was no insufficiency. MITRAL VALVE: Valve structure was normal. There was no evidence for stenosis. There was trivial regurgitation. TRICUSPID VALVE: The valve structure was normal. There was no evidence for 
stenosis. There was no regurgitation. PULMONIC VALVE: Not well visualized. There was no evidence for stenosis. There 
was trivial regurgitation. PERICARDIUM: There was no pericardial effusion. AORTA: The root exhibited normal size. SUMMARY: 
 
-  Left ventricle: Systolic function was normal. Ejection fraction was 
estimated in the range of 55 % to 60 %. There were no regional wall motion 
abnormalities. SYSTEM MEASUREMENT TABLES 
 
2D mode AoR Diam (2D): 2.5 cm 
LA Dimension (2D): 3 cm Left Atrium Systolic Volume Index; Method of Disks, Biplane; 2D mode;: 15.2 
ml/m2 IVS/LVPW (2D): 1.1 IVSd (2D): 1 cm LVIDd (2D): 3.8 cm LVIDs (2D): 2.5 cm 
LVOT Area (2D): 3.1 cm2 LVPWd (2D): 0.9 cm Tissue Doppler Imaging LV Peak Early Charles Tissue Win; Lateral MA (TDI): 17.2 cm/s LV Peak Early Charles Tissue Win; Medial MA (TDI): 8.2 cm/s Unspecified Scan Mode Peak Grad; Mean; Antegrade Flow: 8 mm[Hg] Vmax; Antegrade Flow: 134 cm/s LVOT Diam: 2 cm 
MV Peak Win/LV Peak Tissue Win E-Wave; Lateral MA: 4 MV Peak Win/LV Peak Tissue Win E-Wave; Medial MA: 8.4 Prepared and signed by 
 
Chata Cameron MD [facility] Signed 08-Apr-2019 12:22:39 Labs and Studies from previous 24 hours have been personally reviewed by myself   
ASSESSMENT Active Hospital Problems Diagnosis Date Noted  Encephalopathy, toxic 04/08/2019  Toxic metabolic encephalopathy 99/42/5857  Rhabdomyolysis 04/07/2019  Sepsis ([facility] 04/06/2019  Hepatitis 04/06/2019  Substance use disorder 04/06/2019  Hip pain 04/06/2019  Hyponatremia 04/06/2019  CIRA (acute kidney injury) ([facility] 04/06/2019  UTI (urinary tract infection) 04/06/2019  Hypertension 04/06/2019  Asthma 05/19/2016 Last Assessment & Plan:  
Albuterol prn  Borderline personality disorder ([facility] 05/19/2016 Last Assessment & Plan:  
Continue Prozac, Seroquel, and trazodone Hospital Problems as of 4/9/2019 Date Reviewed: 4/7/2019 Codes Class Noted - Resolved POA Encephalopathy, toxic ICD-10-CM: G92 ICD-9-CM: 349.82  4/8/2019 - Present Unknown Toxic metabolic encephalopathy NFO-88-ROMO: X22 ICD-9-CM: 349.82  4/8/2019 - Present Unknown Rhabdomyolysis ICD-10-CM: L30.93 ICD-9-CM: 728.88  4/7/2019 - Present Yes * (Principal) Sepsis ([facility] ICD-10-CM: A41.9 ICD-9-CM: 038.9, 995.91  4/6/2019 - Present Yes Hepatitis ICD-10-CM: K75.9 ICD-9-CM: 573.3  4/6/2019 - Present Yes Substance use disorder (Chronic) ICD-10-CM: F19.90 ICD-9-CM: 305.90  4/6/2019 - Present Yes Hip pain ICD-10-CM: M25.559 ICD-9-CM: 719.45  4/6/2019 - Present Yes Hyponatremia ICD-10-CM: E87.1 ICD-9-CM: 276.1  4/6/2019 - Present Yes CIRA (acute kidney injury) ([facility] ICD-10-CM: N17.9 ICD-9-CM: 584.9  4/6/2019 - Present Yes  
   
 UTI (urinary tract infection) ICD-10-CM: N39.0 ICD-9-CM: 599.0  4/6/2019 - Present Yes Hypertension ICD-10-CM: I10 
ICD-9-CM: 401.9  4/6/2019 - Present Yes Asthma (Chronic) ICD-10-CM: J45.909 ICD-9-CM: 493.90  5/19/2016 - Present Yes Overview Signed 4/7/2019  7:19 AM by GARRET Chaidez Last Assessment & Plan:  
Albuterol prn Borderline personality disorder ([facility] (Chronic) ICD-10-CM: F60.3 ICD-9-CM: 301.83  5/19/2016 - Present Yes Overview Signed 4/7/2019  7:19 AM by GARRET Chaidez Last Assessment & Plan:  
Continue Prozac, Seroquel, and trazodone A/P: 
 
-Sepsis secondary to ? UTI WBC trending  Down Blood and urine cultures neg so far CT AP with PID vs rupture ovarian cyst 
Vancomycin d/c on 04/2019 Cont zosyn an doxy Will ask ID to evaluate as WBC trending down on abxs and not definitive source of infection identified. -CIRA Likely secondary to rhabdo Renal function worsening Nephrology eval appreciated Patient going for HD today 
 
-Rhabdo CPK trending down Decrease IVFs to 100 to prevent fluid overload - Toxic encephalopathy Likely secondary to amphetamine Resolved 
 
-Rt peroneal vein dvt Cont heparin drip 
 
-Elevated LFTs Likely due to rhabdo Trending down 
 viral hepatitis panel and HIV neg Cont to trend DVT Prophylaxis: heparin CODE Status: Full Plan of Care Discussed with: patient. Care team. 
 
 
Temi Son MD 
04/09/19

## 2019-04-09 NOTE — PROGRESS NOTES
IV heparin rate has been adjusted based on the most recent PTT results. Lab Results Component Value Date/Time  
 aPTT 115.9 (H) 04/09/2019 05:50 AM  
next PTT @ 1330. No change

## 2019-04-09 NOTE — PROGRESS NOTES
END OF SHIFT NOTE: 
 
INTAKE/OUTPUT 
04/08 0701 - 04/09 0700 In: 4510.9 [P.O.:240; I.V.:4270.9] Out: 225 [Urine:225] Voiding: NO 
Catheter: YES Drain:   
 
 
 
 
 
Flatus: Patient does have flatus present. Stool:  1 occurrences. Characteristics: 
Stool Assessment Stool Color: Harper Scriver Stool Appearance: Loose Stool Amount: Medium Stool Source/Status: Rectum Emesis: 0 occurrences. Characteristics: VITAL SIGNS Patient Vitals for the past 12 hrs: 
 Temp Pulse Resp BP SpO2  
04/09/19 1512 97.6 °F (36.4 °C) (!) 112 16 (!) 151/91 98 % 04/09/19 1312 97.4 °F (36.3 °C) (!) 103 16 (!) 137/95 99 % 04/09/19 1257  95  (!) 141/98   
04/09/19 1234  93  (!) 147/102   
04/09/19 1201  89  (!) 125/95   
04/09/19 1128  93  148/89   
04/09/19 1057  (!) 101  (!) 145/91   
04/09/19 1027  95 16 144/89  Pain Assessment Pain Intensity 1: 5 (04/09/19 1642) Pain Location 1: Leg 
Pain Intervention(s) 1: Medication (see MAR) Patient Stated Pain Goal: 0 Ambulating Yes with assistance Shift report given to oncoming nurse at the bedside.  
 
Apple Rosenberg RN

## 2019-04-09 NOTE — PROGRESS NOTES
Ptt 63. Instructions to give 35unit/kg bolus of Heparin. Rate increased 2u/kg/hr. Order put in to recheck ptt in 6 hrs.

## 2019-04-09 NOTE — PROGRESS NOTES
Consultation received 4/8/2019 Patient off floor on multiple attempts to see for procedures yesterday Chart reviewed Did have a discussion with significant other yesterday briefly and he did indicate that K2 and bath salts had been part of the patient's drug regime a year or 2 ago but he was unaware of anything currently. No additional information with reference to  apparent seizure history based upon the allergy to Dilantin and Keppra Formal consult to follow

## 2019-04-09 NOTE — DIALYSIS
TRANSFER IN - DIALYSIS Received patient in dialysis unit  from room 205 (unit) for ordered procedure. Consent verified for renal replacement therapy. Patient drowsy, responds to voice and oriented x 3 and vital signs stable. /91  P 101 Hemodialysis initiated using right temp cath. Aspirated and flushed both ports without difficulty. Dressing clean, dry and intact. Machine settings per MD order. Heparin 0 unit bolus and 0 units/hr. Will monitor during treatment.

## 2019-04-09 NOTE — CONSULTS
Consult    Patient: Jose Luis Vieyra MRN: [id number]  SSN: [id number]    YOB: 1990  Age: 33 y.o. Sex: female      Subjective: Jose Luis Vieyra is a 33 y.o. female who is being seen for evaluation with reference to an acute myopathy renal failure leg pain and history of intolerance to antiepileptic drugs but with an absence of significant seizure history. There is a see history however of significant drug and alcohol usage. As noted in my note yesterday with reference to history to which reference is made in terms of the subjective aspect of today's visit    Past Medical History:   Diagnosis Date    Asthma     Chronic back pain     Chronic lower back pain     Psychiatric disorder     bipolar    Seizures ([facility]      Past Surgical History:   Procedure Laterality Date    HX GYN      2 c-sec, tubal    IR INSERT NON TUNL CVC OVER 5 YRS  4/8/2019      History reviewed. No pertinent family history.   Social History     Tobacco Use    Smoking status: Current Every Day Smoker     Packs/day: 1.00     Years: 1.00     Pack years: 1.00    Smokeless tobacco: Never Used   Substance Use Topics    Alcohol use: Yes     Comment: occassionally      Current Facility-Administered Medications   Medication Dose Route Frequency Provider Last Rate Last Dose    doxycycline (VIBRAMYCIN) capsule 100 mg  100 mg Oral Q12H Nell Romo MD   100 mg at 04/08/19 2240    piperacillin-tazobactam (ZOSYN) 3.375 g in 0.9% sodium chloride (MBP/ADV) 100 mL  3.375 g IntraVENous Q12H Alexandre Romo MD 25 mL/hr at 04/09/19 0556 3.375 g at 04/09/19 0556    thiamine HCL (B-1) tablet 100 mg  100 mg Oral DAILY Jailene Abraham MD   100 mg at 07/67/20 3823    folic acid (FOLVITE) tablet 1 mg  1 mg Oral DAILY Jailene Abraham MD   1 mg at 04/08/19 1427    heparin 25,000 units in dextrose 500 mL infusion  18-36 Units/kg/hr IntraVENous TITRATE Alexandre Romo MD 22.7 mL/hr at 04/09/19 0727 20 Units/kg/hr at 04/09/19 0727    sodium chloride (NS) flush 5-10 mL  5-10 mL IntraVENous PRN Trina Romo MD        ondansetron (ZOFRAN) injection 4 mg  4 mg IntraVENous Q6H PRN Trina Romo MD   4 mg at 04/08/19 1532    0.9% sodium chloride infusion  150 mL/hr IntraVENous CONTINUOUS Trina Romo  mL/hr at 04/09/19 0555 150 mL/hr at 04/09/19 0555    nicotine (NICODERM CQ) 21 mg/24 hr patch 1 Patch  1 Patch TransDERmal DAILY Trina Romo MD   1 Patch at 04/08/19 0910    LORazepam (ATIVAN) injection 1 mg  1 mg IntraVENous Q6H PRN Trina Romo MD   1 mg at 04/07/19 1256    morphine injection 2 mg  2 mg IntraVENous Q4H PRN Trina Romo MD   2 mg at 04/09/19 0804    naloxone (NARCAN) injection 0.4 mg  0.4 mg IntraVENous EVERY 2 MINUTES AS NEEDED Trina Romo MD            Allergies   Allergen Reactions    Dilantin [Phenytoin Sodium Extended] Itching    Keppra [Levetiracetam] Rash       Review of Systems: In light of patient's cognitive function not felt to be reliable    Objective:     Vitals:    04/09/19 0804 04/09/19 1027 04/09/19 1057 04/09/19 1128   BP:  144/89 (!) 145/91 148/89   Pulse:  95 (!) 101 93   Resp:  16     Temp:       SpO2:       Weight: 155 lb 9.6 oz (70.6 kg)      Height:            Physical Exam:  Chronically ill-appearing female. No nuchal rigidity no rash  Cranial nerves II through XII within normal limits  Motor examination poor effort throughout.   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  I would continue with current management  Probable history of alcohol related seizures in the past    Hospital Problems  Date Reviewed: 4/7/2019          Codes Class Noted POA    Encephalopathy, toxic ICD-10-CM: G92  ICD-9-CM: 349.82  4/8/2019 Unknown        Toxic metabolic encephalopathy [facility]-94-FQ: G92  ICD-9-CM: 349.82  4/8/2019 Unknown        Rhabdomyolysis ICD-10-CM: M62.82  ICD-9-CM: 728.88  4/7/2019 Yes        * (Principal) Sepsis ([facility] ICD-10-CM: A41.9  ICD-9-CM: 038.9, 995.91  4/6/2019 Yes        Hepatitis ICD-10-CM: K75.9  ICD-9-CM: 573.3  4/6/2019 Yes        Substance use disorder (Chronic) ICD-10-CM: F19.90  ICD-9-CM: 305.90  4/6/2019 Yes        Hip pain ICD-10-CM: M25.559  ICD-9-CM: 719.45  4/6/2019 Yes        Hyponatremia ICD-10-CM: E87.1  ICD-9-CM: 276.1  4/6/2019 Yes        CIRA (acute kidney injury) ([facility] ICD-10-CM: N17.9  ICD-9-CM: 584.9  4/6/2019 Yes        UTI (urinary tract infection) ICD-10-CM: N39.0  ICD-9-CM: 599.0  4/6/2019 Yes        Hypertension ICD-10-CM: I10  ICD-9-CM: 401.9  4/6/2019 Yes        Asthma (Chronic) ICD-10-CM: J45.909  ICD-9-CM: 493.90  5/19/2016 Yes    Overview Signed 4/7/2019  7:19 AM by GARRET Morgan     Last Assessment & Plan:   Albuterol prn             Borderline personality disorder ([facility] (Chronic) ICD-10-CM: F60.3  ICD-9-CM: 301.83  5/19/2016 Yes    Overview Signed 4/7/2019  7:19 AM by GARRET Morgan     Last Assessment & Plan:   Continue Prozac, Seroquel, and trazodone                   Plan:     Seizure disorder not an active problem at this time  Combination of rhabdomyolysis and renal failure may well  be related to the possibility of an underlying vasculitic entity induced by drug use.   This has clearly been reported in the context of K2 and bath salts, and may well be an issue with other compounds    We will see on request.  Believe leg pain will likely resolve as rhabdomyolysis remits  Would probably cover the patient with thiamine given the alcohol history and evidence and history from family members and most close to her of poor oral intake    If CPK remains strikingly elevated then we will autoimmune serologies    Signed By: Elver Macedo MD     April 9, 2019

## 2019-04-09 NOTE — DIALYSIS
TRANSFER OUT- DIALYSIS Hemodialysis treatment completed without complications. Patient alert and oriented x 3 and VS stable  /98  P 95   
 
 1.5 Kgs removed. Flushed both ports with 10 mL of NS.  CVC dressing clean, dry, and intact, tego caps intact, bilateral lumens wrapped with 4x4 gauze. Meds given-morphine. Patient to room 205 after dialysis.

## 2019-04-09 NOTE — PROGRESS NOTES
Admit Date: 4/6/2019 Subjective:  
 
  
 
Review of Systems Lethargic. Pain all over Objective:  
 
Patient Vitals for the past 8 hrs: 
 BP Temp Pulse Resp SpO2 Weight 04/09/19 0804      70.6 kg (155 lb 9.6 oz) 04/09/19 0715 136/86 97.8 °F (36.6 °C) 96 16 98 %   
04/09/19 0408 129/87 98.2 °F (36.8 °C) (!) 104 18 96 %  No intake/output data recorded. Physical Exam:  
Lungs: clear CV: RR, Abdomen: soft,tender, no rebound. Ext: ++ edema Data Review Recent Results (from the past 8 hour(s)) CK Collection Time: 04/09/19  5:50 AM  
Result Value Ref Range CK 11,557 (H) 21 - 215 U/L  
CBC WITH AUTOMATED DIFF Collection Time: 04/09/19  5:50 AM  
Result Value Ref Range WBC 12.3 (H) 4.3 - 11.1 K/uL  
 RBC 3.69 (L) 4.05 - 5.2 M/uL  
 HGB 11.2 (L) 11.7 - 15.4 g/dL HCT 34.3 (L) 35.8 - 46.3 % MCV 93.0 79.6 - 97.8 FL  
 MCH 30.4 26.1 - 32.9 PG  
 MCHC 32.7 31.4 - 35.0 g/dL  
 RDW 13.8 11.9 - 14.6 % PLATELET 381 (L) 327 - 450 K/uL MPV 10.7 9.4 - 12.3 FL ABSOLUTE NRBC 0.00 0.0 - 0.2 K/uL  
 DF AUTOMATED NEUTROPHILS 79 (H) 43 - 78 % LYMPHOCYTES 14 13 - 44 % MONOCYTES 6 4.0 - 12.0 % EOSINOPHILS 1 0.5 - 7.8 % BASOPHILS 0 0.0 - 2.0 % IMMATURE GRANULOCYTES 1 0.0 - 5.0 %  
 ABS. NEUTROPHILS 9.7 (H) 1.7 - 8.2 K/UL  
 ABS. LYMPHOCYTES 1.7 0.5 - 4.6 K/UL  
 ABS. MONOCYTES 0.7 0.1 - 1.3 K/UL  
 ABS. EOSINOPHILS 0.1 0.0 - 0.8 K/UL  
 ABS. BASOPHILS 0.1 0.0 - 0.2 K/UL  
 ABS. IMM. GRANS. 0.1 0.0 - 0.5 K/UL METABOLIC PANEL, COMPREHENSIVE Collection Time: 04/09/19  5:50 AM  
Result Value Ref Range Sodium 134 (L) 136 - 145 mmol/L Potassium 4.1 3.5 - 5.1 mmol/L Chloride 108 (H) 98 - 107 mmol/L  
 CO2 12 (L) 21 - 32 mmol/L Anion gap 14 7 - 16 mmol/L Glucose 82 65 - 100 mg/dL BUN 53 (H) 6 - 23 MG/DL Creatinine 6.97 (H) 0.6 - 1.0 MG/DL  
 GFR est AA 9 (L) >60 ml/min/1.73m2 GFR est non-AA 7 (L) >60 ml/min/1.73m2 Calcium 6.8 (L) 8.3 - 10.4 MG/DL Bilirubin, total 0.4 0.2 - 1.1 MG/DL  
 ALT (SGPT) 271 (H) 12 - 65 U/L  
 AST (SGOT) 602 (H) 15 - 37 U/L Alk. phosphatase 31 (L) 50 - 136 U/L Protein, total 4.4 (L) 6.3 - 8.2 g/dL Albumin 1.6 (L) 3.5 - 5.0 g/dL Globulin 2.8 2.3 - 3.5 g/dL A-G Ratio 0.6 (L) 1.2 - 3.5 PTT Collection Time: 04/09/19  5:50 AM  
Result Value Ref Range aPTT 115.9 (H) 24.7 - 39.8 SEC Assessment:  
 
Principal Problem: 
  Sepsis ([facility] (4/6/2019) Active Problems: 
  Hepatitis (4/6/2019) Substance use disorder (4/6/2019) Hip pain (4/6/2019) Hyponatremia (4/6/2019) CIRA (acute kidney injury) ([facility] [address] (4/6/2019) UTI (urinary tract infection) (4/6/2019) Hypertension (4/6/2019) Rhabdomyolysis (4/7/2019) Asthma (5/19/2016) Overview: Last Assessment & Plan:  
    Albuterol prn Borderline personality disorder ([facility] (5/19/2016) Overview: Last Assessment & Plan:  
    Continue Prozac, Seroquel, and trazodone Encephalopathy, toxic (4/8/2019) Toxic metabolic encephalopathy (6/8/7825) Plan:  
 
Renal F. Worse with oliguria. HD today Jonatan Lara MD

## 2019-04-09 NOTE — PROGRESS NOTES
IV heparin rate has been adjusted based on the most recent PTT results. Lab Results Component Value Date/Time  
 aPTT 103.3 (H) 04/09/2019 03:15 PM  
2 consecutive no change. Next PTT @ 0400 4-10-19

## 2019-04-09 NOTE — CONSULTS
Infectious Disease Consult    Today's Date: 4/9/2019   Admit Date: 4/6/2019    Impression:   · Bilateral buttock myositis  · Rhabdomyolysis; CK >14,000  · E coli UTI (R Bactrim/Amp) (3/28); repeat urine cx (4/6) NG; took 2 days of PO Cipro  · Polysubstance abuse; hx overdose  · CIRA; HD started 4/8  · HIV (-)  · Thrombosis of the right peroneal vein  · Bipolar disorder  · Elevated LFT's with downward trend    Plan:   · Stop IV Zosyn and Doxcycline   · Mild cervical motion tenderness on exam; check urine GC/chlamydia & Trich  · Monitor labs daily    Anti-infectives:   · PO Doxycycline (4/8-  · IV Zosyn (4/6-  · Vancomycin (4/6-4/7)  · Rocephin (4/6)    Subjective:   Date of Consultation:  April 9, 2019  Referring Physician: Dr. Ro Sampson    Patient is a 33 y.o. female  With PMH polysubstance abuse who presented for malaise. Elevated CK, Scr, and LFT's noted initially in ER. Blood cx NGTD. Urine cx grew 100,00 CFU E coli treated with a short course of Cipro. Repeat urine cx this admission NG.   CT A/P 4/7 showed New fluid and stranding throughout the pelvic fat, of unclear etiology. Differential considerations would include pelvic inflammatory disease or the sequela of a ruptured ovarian cyst. There are also new edematous changes in the subcutaneous tissue of the pelvis and bilateral buttock muscles. Correlate for myositis per radiology. Patient has been started on PO Doxycycline and IV Zosyn. No fevers prior to presentation or since admission. No vaginal discharge. No urinary symtoms. Vaginal bleeding post U/S exam.  ID is asked to evaluate this patient for possible PID.      Patient Active Problem List   Diagnosis Code    Sepsis ([address] A41.9    Hepatitis K75.9    Substance use disorder F19.90    Hip pain M25.559    Hyponatremia E87.1    CIRA (acute kidney injury) ([facility] N17.9    UTI (urinary tract infection) N39.0    Hypertension I10    Rhabdomyolysis M62.82    Asthma J45.909    Borderline personality disorder ([facility] F60.3    Constipation K59.00    Encephalopathy, toxic G92    Toxic metabolic encephalopathy Q58     Past Medical History:   Diagnosis Date    Asthma     Chronic back pain     Chronic lower back pain     Psychiatric disorder     bipolar    Seizures ([facility]       History reviewed. No pertinent family history. Social History     Tobacco Use    Smoking status: Current Every Day Smoker     Packs/day: 1.00     Years: 1.00     Pack years: 1.00    Smokeless tobacco: Never Used   Substance Use Topics    Alcohol use: Yes     Comment: occassionally     Past Surgical History:   Procedure Laterality Date    HX GYN      2 c-sec, tubal    IR INSERT NON TUNL CVC OVER 5 YRS  2019      Prior to Admission medications    Not on File       Allergies   Allergen Reactions    Dilantin [Phenytoin Sodium Extended] Itching    Keppra [Levetiracetam] Rash        Review of Systems:  A comprehensive review of systems was negative except for that written in the History of Present Illness. Objective:     Visit Vitals  BP (!) 141/98   Pulse 95   Temp 97.8 °F (36.6 °C)   Resp 16   Ht 4' 11\" (1.499 m)   Wt 70.6 kg (155 lb 9.6 oz)   SpO2 98%   BMI 31.43 kg/m²     Temp (24hrs), Av.9 °F (36.6 °C), Min:97.4 °F (36.3 °C), Max:98.5 °F (36.9 °C)       Lines:  Peripheral IV:       Physical Exam:    General:  Alert, cooperative, well noursished, well developed, appears stated age   Eyes:  Sclera anicteric. Pupils equally round and reactive to light. Mouth/Throat: Mucous membranes normal, oral pharynx clear   Neck: Supple   Lungs:   Clear to auscultation bilaterally, good effort   CV:  Regular rate and rhythm,no murmur, click, rub or gallop   Abdomen:   Soft, non-tender.  bowel sounds normal. non-distended   Extremities: No cyanosis or edema   Skin: Skin color, texture, turgor normal. no acute rash or lesions   Lymph nodes: Cervical and supraclavicular normal   Musculoskeletal: No swelling or deformity   Lines/Devices: Intact, no erythema, drainage or tenderness   Psych: Alert and oriented, normal mood affect given the setting       Data Review:     CBC:  Recent Labs     19  0550 19   WBC 12.3* 17.0* 21.2*   GRANS 79* 79* 81*   MONOS 6 6 5   EOS 1 0* 0*   ANEU 9.7* 13.5* 17.1*   ABL 1.7 2.3 2.8   HGB 11.2* 12.3 17.1*   HCT 34.3* 37.2 51.4*   * 147* 203       BMP:  Recent Labs     19  0550 19  0757 19   CREA 6.97* 5.31* 4.13*   BUN 53* 44* 39*   * 134* 134*   K 4.1 4.5 4.7   * 106 105   CO2 12* 14* 18*   AGAP 14 14 11   GLU 82 84 82       LFTS:  Recent Labs     19  0550 19   TBILI 0.4 0.5 0.5   * 310* 491*   SGOT 602* 726* 1,155*   AP 31* 29* 39*   TP 4.4* 4.7* 6.1*   ALB 1.6* 1.7* 2.3*       Microbiology:     All Micro Results     Procedure Component Value Units Date/Time    CULTURE, BLOOD [[id number]] Collected:  19    Order Status:  Completed Specimen:  Blood Updated:  19     Special Requests: --        LEFT  ARM       Culture result: NO GROWTH 1 DAY       CULTURE, BLOOD [[id number]] Collected:  19    Order Status:  Completed Specimen:  Blood Updated:  19     Special Requests: --        LEFT  HAND       Culture result: NO GROWTH 3 DAYS       CULTURE, URINE [[id number]] Collected:  19 185    Order Status:  Completed Specimen:  Cath Urine Updated:  1945     Special Requests: NO SPECIAL REQUESTS        Culture result: NO GROWTH 2 DAYS       CULTURE, URINE [[id number]] Collected:  19    Order Status:  Canceled Specimen:  Urine from Clean catch     CULTURE, BLOOD [[id number]]     Order Status:  Sent Specimen:  Blood           Imagin/8 pelvic US (-)    19 CT abd/pelvis  IMPRESSION  IMPRESSION:  1. IMPRESSION  IMPRESSION:  1. New fluid and stranding throughout the pelvic fat, of unclear etiology.   Differential considerations would include pelvic inflammatory disease or the  sequela of a ruptured ovarian cyst.  2. There are also new edematous changes in the subcutaneous tissue of the pelvis  and bilateral buttock muscles. Correlate for myositis. 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   Mild to moderate stenosis in the mid left superficial femoral artery.   Turbulent flow in the left common femoral artery raises the possibility of  atherosclerotic plaque or stenosis in the left external iliac artery. 4/7/19 venous US  IMPRESSION:  1. No evidence of DVT in the left leg. 2.  Thrombosis of the right peroneal vein. No thrombus is seen about the knee. MRI l-spine 4/6  IMPRESSION:  1. Lumbar spine is unremarkable. 2.  Developing presacral fluid/edema, uncertain significance.   Suggest CT of the  pelvis with IV contrast.      4/6/19 Hip XR (-)    4/6 CXR (-)    4/6 Head CT (-)        Signed By: Shelly Dvais NP     April 9, 2019

## 2019-04-10 ENCOUNTER — APPOINTMENT (OUTPATIENT)
Dept: INTERVENTIONAL RADIOLOGY/VASCULAR | Age: 29
DRG: 871 | End: 2019-04-10
Attending: INTERNAL MEDICINE
Payer: SELF-PAY

## 2019-04-10 LAB
ALBUMIN SERPL-MCNC: 1.5 G/DL (ref 3.5–5)
ALBUMIN/GLOB SERPL: 0.5 {RATIO} (ref 1.2–3.5)
ALP SERPL-CCNC: 26 U/L (ref 50–136)
ALT SERPL-CCNC: 231 U/L (ref 12–65)
ANION GAP SERPL CALC-SCNC: 10 MMOL/L (ref 7–16)
APTT PPP: 100.4 SEC (ref 24.7–39.8)
AST SERPL-CCNC: 436 U/L (ref 15–37)
BASOPHILS # BLD: 0 K/UL (ref 0–0.2)
BASOPHILS NFR BLD: 0 % (ref 0–2)
BILIRUB SERPL-MCNC: 0.3 MG/DL (ref 0.2–1.1)
BUN SERPL-MCNC: 41 MG/DL (ref 6–23)
CALCIUM SERPL-MCNC: 7.3 MG/DL (ref 8.3–10.4)
CHLORIDE SERPL-SCNC: 106 MMOL/L (ref 98–107)
CK SERPL-CCNC: 8594 U/L (ref 21–215)
CO2 SERPL-SCNC: 21 MMOL/L (ref 21–32)
CREAT SERPL-MCNC: 6.55 MG/DL (ref 0.6–1)
DIFFERENTIAL METHOD BLD: ABNORMAL
EOSINOPHIL # BLD: 0.3 K/UL (ref 0–0.8)
EOSINOPHIL NFR BLD: 4 % (ref 0.5–7.8)
ERYTHROCYTE [DISTWIDTH] IN BLOOD BY AUTOMATED COUNT: 13.7 % (ref 11.9–14.6)
GLOBULIN SER CALC-MCNC: 3 G/DL (ref 2.3–3.5)
GLUCOSE SERPL-MCNC: 85 MG/DL (ref 65–100)
HCT VFR BLD AUTO: 31 % (ref 35.8–46.3)
HGB BLD-MCNC: 10.7 G/DL (ref 11.7–15.4)
IMM GRANULOCYTES # BLD AUTO: 0.1 K/UL (ref 0–0.5)
IMM GRANULOCYTES NFR BLD AUTO: 1 % (ref 0–5)
LYMPHOCYTES # BLD: 1.5 K/UL (ref 0.5–4.6)
LYMPHOCYTES NFR BLD: 17 % (ref 13–44)
MCH RBC QN AUTO: 30.9 PG (ref 26.1–32.9)
MCHC RBC AUTO-ENTMCNC: 34.5 G/DL (ref 31.4–35)
MCV RBC AUTO: 89.6 FL (ref 79.6–97.8)
MONOCYTES # BLD: 0.7 K/UL (ref 0.1–1.3)
MONOCYTES NFR BLD: 8 % (ref 4–12)
NEUTS SEG # BLD: 6.2 K/UL (ref 1.7–8.2)
NEUTS SEG NFR BLD: 71 % (ref 43–78)
NRBC # BLD: 0 K/UL (ref 0–0.2)
PLATELET # BLD AUTO: 162 K/UL (ref 150–450)
PMV BLD AUTO: 11.1 FL (ref 9.4–12.3)
POTASSIUM SERPL-SCNC: 3.5 MMOL/L (ref 3.5–5.1)
PROT SERPL-MCNC: 4.5 G/DL (ref 6.3–8.2)
RBC # BLD AUTO: 3.46 M/UL (ref 4.05–5.2)
SODIUM SERPL-SCNC: 137 MMOL/L (ref 136–145)
WBC # BLD AUTO: 8.7 K/UL (ref 4.3–11.1)

## 2019-04-10 PROCEDURE — 74011250636 HC RX REV CODE- 250/636: Performed by: INTERNAL MEDICINE

## 2019-04-10 PROCEDURE — 0JH63XZ INSERTION OF TUNNELED VASCULAR ACCESS DEVICE INTO CHEST SUBCUTANEOUS TISSUE AND FASCIA, PERCUTANEOUS APPROACH: ICD-10-PCS | Performed by: PHYSICIAN ASSISTANT

## 2019-04-10 PROCEDURE — 74011250637 HC RX REV CODE- 250/637: Performed by: INTERNAL MEDICINE

## 2019-04-10 PROCEDURE — 02H633Z INSERTION OF INFUSION DEVICE INTO RIGHT ATRIUM, PERCUTANEOUS APPROACH: ICD-10-PCS | Performed by: PHYSICIAN ASSISTANT

## 2019-04-10 PROCEDURE — 85730 THROMBOPLASTIN TIME PARTIAL: CPT

## 2019-04-10 PROCEDURE — 65660000000 HC RM CCU STEPDOWN

## 2019-04-10 PROCEDURE — 77001 FLUOROGUIDE FOR VEIN DEVICE: CPT

## 2019-04-10 PROCEDURE — 65270000029 HC RM PRIVATE

## 2019-04-10 PROCEDURE — 80053 COMPREHEN METABOLIC PANEL: CPT

## 2019-04-10 PROCEDURE — 77030031131 HC SUT MXN P COVD -B

## 2019-04-10 PROCEDURE — 82550 ASSAY OF CK (CPK): CPT

## 2019-04-10 PROCEDURE — 77030018719 HC DRSG PTCH ANTIMIC J&J -A

## 2019-04-10 PROCEDURE — 77030020263 HC SOL INJ SOD CL0.9% LFCR 1000ML

## 2019-04-10 PROCEDURE — C1750 CATH, HEMODIALYSIS,LONG-TERM: HCPCS

## 2019-04-10 PROCEDURE — 85025 COMPLETE CBC W/AUTO DIFF WBC: CPT

## 2019-04-10 PROCEDURE — 74011250637 HC RX REV CODE- 250/637: Performed by: FAMILY MEDICINE

## 2019-04-10 PROCEDURE — 90935 HEMODIALYSIS ONE EVALUATION: CPT

## 2019-04-10 PROCEDURE — 74011250636 HC RX REV CODE- 250/636: Performed by: PHYSICIAN ASSISTANT

## 2019-04-10 PROCEDURE — C1751 CATH, INF, PER/CENT/MIDLINE: HCPCS

## 2019-04-10 PROCEDURE — C1894 INTRO/SHEATH, NON-LASER: HCPCS

## 2019-04-10 PROCEDURE — 02PAX3Z REMOVAL OF INFUSION DEVICE FROM HEART, EXTERNAL APPROACH: ICD-10-PCS | Performed by: PHYSICIAN ASSISTANT

## 2019-04-10 PROCEDURE — 74011000250 HC RX REV CODE- 250: Performed by: PHYSICIAN ASSISTANT

## 2019-04-10 PROCEDURE — 77030002916 HC SUT ETHLN J&J -A

## 2019-04-10 PROCEDURE — 77030037400 HC ADH TISS HI VISC EXOFIN CHMP -B

## 2019-04-10 RX ORDER — HYDROCODONE BITARTRATE AND ACETAMINOPHEN 10; 325 MG/1; MG/1
1 TABLET ORAL
Status: DISCONTINUED | OUTPATIENT
Start: 2019-04-10 | End: 2019-04-24 | Stop reason: HOSPADM

## 2019-04-10 RX ORDER — HEPARIN SODIUM 200 [USP'U]/100ML
1000 INJECTION, SOLUTION INTRAVENOUS ONCE
Status: COMPLETED | OUTPATIENT
Start: 2019-04-10 | End: 2019-04-10

## 2019-04-10 RX ORDER — LIDOCAINE HYDROCHLORIDE 20 MG/ML
20-200 INJECTION, SOLUTION INFILTRATION; PERINEURAL ONCE
Status: ACTIVE | OUTPATIENT
Start: 2019-04-10 | End: 2019-04-10

## 2019-04-10 RX ORDER — DIPHENHYDRAMINE HCL 25 MG
25 CAPSULE ORAL
Status: DISCONTINUED | OUTPATIENT
Start: 2019-04-10 | End: 2019-04-24 | Stop reason: HOSPADM

## 2019-04-10 RX ORDER — HEPARIN SODIUM 1000 [USP'U]/ML
1000-6000 INJECTION, SOLUTION INTRAVENOUS; SUBCUTANEOUS ONCE
Status: COMPLETED | OUTPATIENT
Start: 2019-04-10 | End: 2019-04-10

## 2019-04-10 RX ADMIN — MORPHINE SULFATE 2 MG: 2 INJECTION, SOLUTION INTRAMUSCULAR; INTRAVENOUS at 09:12

## 2019-04-10 RX ADMIN — MORPHINE SULFATE 2 MG: 2 INJECTION, SOLUTION INTRAMUSCULAR; INTRAVENOUS at 14:31

## 2019-04-10 RX ADMIN — ONDANSETRON 4 MG: 2 INJECTION INTRAMUSCULAR; INTRAVENOUS at 00:33

## 2019-04-10 RX ADMIN — HEPARIN SODIUM 2000 UNITS: 200 INJECTION, SOLUTION INTRAVENOUS at 11:18

## 2019-04-10 RX ADMIN — FOLIC ACID 1 MG: 1 TABLET ORAL at 09:12

## 2019-04-10 RX ADMIN — SODIUM CHLORIDE 75 ML/HR: 900 INJECTION, SOLUTION INTRAVENOUS at 18:14

## 2019-04-10 RX ADMIN — DIPHENHYDRAMINE HYDROCHLORIDE 25 MG: 25 CAPSULE ORAL at 08:09

## 2019-04-10 RX ADMIN — HEPARIN SODIUM 3000 UNITS: 1000 INJECTION INTRAVENOUS; SUBCUTANEOUS at 11:41

## 2019-04-10 RX ADMIN — DIPHENHYDRAMINE HYDROCHLORIDE 25 MG: 25 CAPSULE ORAL at 22:43

## 2019-04-10 RX ADMIN — Medication 100 MG: at 09:12

## 2019-04-10 RX ADMIN — MORPHINE SULFATE 2 MG: 2 INJECTION, SOLUTION INTRAMUSCULAR; INTRAVENOUS at 00:33

## 2019-04-10 RX ADMIN — MORPHINE SULFATE 2 MG: 2 INJECTION, SOLUTION INTRAMUSCULAR; INTRAVENOUS at 20:18

## 2019-04-10 RX ADMIN — LORAZEPAM 1 MG: 2 INJECTION INTRAMUSCULAR; INTRAVENOUS at 11:03

## 2019-04-10 RX ADMIN — SODIUM CHLORIDE 100 ML/HR: 900 INJECTION, SOLUTION INTRAVENOUS at 05:56

## 2019-04-10 RX ADMIN — LIDOCAINE HYDROCHLORIDE 300 MG: 10; .005 INJECTION, SOLUTION EPIDURAL; INFILTRATION; INTRACAUDAL; PERINEURAL at 11:27

## 2019-04-10 RX ADMIN — HYDROCODONE BITARTRATE AND ACETAMINOPHEN 1 TABLET: 10; 325 TABLET ORAL at 18:14

## 2019-04-10 RX ADMIN — MORPHINE SULFATE 2 MG: 2 INJECTION, SOLUTION INTRAMUSCULAR; INTRAVENOUS at 04:48

## 2019-04-10 NOTE — PROGRESS NOTES
Infectious Disease Consult Today's Date: 4/10/2019 Admit Date: 2019 Impression: · Bilateral buttock myositis · Rhabdomyolysis; CK >14,000 · Polysubstance abuse; hx overdose · CIRA; HD started  · HIV (-) · Thrombosis of the right peroneal vein · Bipolar disorder Plan: · Urine pcr for GC, chlamydia, trich is pending · I think that this is all due to compression injury/rhabdomylosis and not infection · I will sign off. Call back with further concerns. Anti-infectives:  
· PO Doxycycline (- 
· IV Zosyn (- · Vancomycin (-) · Rocephin () Subjective:  
Dialysis interrupted this morning to replace dialysis access in right chest which was done this morning. She is having intense pain in her right buttock and thigh. She is unable to move her right leg Allergies Allergen Reactions  Dilantin [Phenytoin Sodium Extended] Itching  Keppra [Levetiracetam] Rash Review of Systems:  A comprehensive review of systems was negative except for that written in the History of Present Illness. Objective:  
 
Visit Vitals BP (!) 143/93 (BP 1 Location: Left arm, BP Patient Position: At rest) Pulse (!) 103 Temp 97.7 °F (36.5 °C) Resp 16 Ht 4' 11\" (1.499 m) Wt 71.8 kg (158 lb 3.2 oz) SpO2 100% BMI 31.95 kg/m² Temp (24hrs), Av.9 °F (36.6 °C), Min:97.4 °F (36.3 °C), Max:98.4 °F (36.9 °C) Lines:  Peripheral IV:    
 
Physical Exam:   
General:  Alert, cooperative, well nourished, well developed, appears stated age Eyes:  Sclera anicteric. Pupils equally round and reactive to light. Mouth/Throat: Mucous membranes normal, oral pharynx clear Neck: Supple Lungs:   Clear to auscultation bilaterally, good effort CV:  Regular rate and rhythm,no murmur, click, rub or gallop Abdomen:   Soft, non-tender. bowel sounds normal. non-distended Extremities: + lower extremity edema; she has tenderness in right thigh, but also winces to touching the left thigh. Skin: Mottled skin in right thigh Lymph nodes: Cervical and supraclavicular normal  
Musculoskeletal: No swelling or deformity Lines/Devices:  Intact, no erythema, drainage or tenderness Psych: Alert and oriented, normal mood/affect given the setting Data Review: CBC: 
Recent Labs 04/10/19 
0448 04/09/19 
0550 04/08/19 
2663 WBC 8.7 12.3* 17.0* GRANS 71 79* 79* MONOS 8 6 6 EOS 4 1 0* ANEU 6.2 9.7* 13.5* ABL 1.5 1.7 2.3 HGB 10.7* 11.2* 12.3 HCT 31.0* 34.3* 37.2  140* 147* BMP: 
Recent Labs 04/10/19 
0448 04/09/19 
0550 04/08/19 
8165 CREA 6.55* 6.97* 5.31* BUN 41* 53* 44*  134* 134* K 3.5 4.1 4.5  
 108* 106 CO2 21 12* 14* AGAP 10 14 14 GLU 85 82 84 LFTS: 
Recent Labs 04/10/19 
0448 04/09/19 
0550 04/08/19 
8750 TBILI 0.3 0.4 0.5 * 271* 310* SGOT 436* 602* 726* AP 26* 31* 29* TP 4.5* 4.4* 4.7* ALB 1.5* 1.6* 1.7* Microbiology:  
 
All Micro Results Procedure Component Value Units Date/Time CULTURE, BLOOD [[id number]] Collected:  04/07/19 2130 Order Status:  Completed Specimen:  Blood Updated:  04/10/19 0636 Special Requests: --     
  LEFT 
ARM Culture result: NO GROWTH 2 DAYS     
 CULTURE, BLOOD [[id number]] Collected:  04/06/19 2032 Order Status:  Completed Specimen:  Blood Updated:  04/10/19 0636 Special Requests: --     
  LEFT 
HAND Culture result: NO GROWTH 4 DAYS     
 CULTURE, URINE [[id number]] Collected:  04/06/19 1857 Order Status:  Completed Specimen:  Cath Urine Updated:  04/09/19 6042 Special Requests: NO SPECIAL REQUESTS Culture result: NO GROWTH 2 DAYS     
 CULTURE, URINE [[id number]] Collected:  04/06/19 2145 Order Status:  Canceled Specimen:  Urine from Clean catch CULTURE, BLOOD [[id number]] Collected:  04/06/19 2000 Order Status:  Canceled Specimen:  Blood Imaging: 4/8 pelvic US (-) 
 
4/7/19 CT abd/pelvis IMPRESSION IMPRESSION: 
1. IMPRESSION IMPRESSION: 
1. New fluid and stranding throughout the pelvic fat, of unclear etiology. Differential considerations would include pelvic inflammatory disease or the 
sequela of a ruptured ovarian cyst. 
2. There are also new edematous changes in the subcutaneous tissue of the pelvis 
and bilateral buttock muscles. Correlate for myositis. 4/7/19 arterial study IMPRESSION:  
Evidence to suggest arterial occlusive disease in the small vessels of the left 
foot.  
Mild to moderate stenosis in the mid left superficial femoral artery.  Turbulent flow in the left common femoral artery raises the possibility of 
atherosclerotic plaque or stenosis in the left external iliac artery. 4/7/19 venous US IMPRESSION: 
1. No evidence of DVT in the left leg. 2.  Thrombosis of the right peroneal vein. No thrombus is seen about the knee. MRI l-spine 4/6 IMPRESSION: 
1. Lumbar spine is unremarkable. 2.  Developing presacral fluid/edema, uncertain significance. Suggest CT of the 
pelvis with IV contrast. 
 
 
4/6/19 Hip XR (-) 
 
4/6 CXR (-) 
 
4/6 Head CT (-) Signed By: Nancy Thomas MD   
 April 10, 2019

## 2019-04-10 NOTE — DIALYSIS
TRANSFER IN - DIALYSIS Received patient in dialysis unit  from [facility] [place] (unit) for ordered procedure. Consent verified for renal replacement therapy. Patient alert and vital signs stable. /101 P 93 Hemodialysis initiated using right I J catheter. Aspirated and flushed both ports without difficulty. Dressing clean, dry and intact. Machine settings per MD order. Will monitor during treatment.

## 2019-04-10 NOTE — DIALYSIS
TRANSFER OUT- DIALYSIS Hemodialysis treatment completed at this time. Right I J catheter non-functioning, unable to achieve ordered BFR above 150. Faisal Petersen NP aware and ordered to discontinue treatment to send pt IR for catheter exchange. Flushed both ports with 10 ML of NS. Tego caps intact. Patient alert and VS stable  /66  P 101 1.0 Kgs removed. Meds given-Benedryl. Patient to radiology after dialysis. Report given to JULISSA Mckeon in IR.

## 2019-04-10 NOTE — PROGRESS NOTES
Hospitalist Progress Note 4/10/2019 Admit Date: 2019  4:59 PM  
NAME: Gretel Corral :  1990 DOS:              04/10/19 MRN:  [id number] Attending: Zia Guerra MD 
PCP:  None Treatment Team: Attending Provider: Abbie Galloway MD; Consulting Provider: Tanya Jenkins MD; Care Manager: Andrew Rodrigez RN; Consulting Provider: Enrrique Phan MD; Utilization Review: Mary Lynn RN Full Code SUBJECTIVE: As previously documented: 32 yo female with PMH of polysubstance use who is evaluated with malaise and bilateral hip pain. She has UDS showing amphetamines, benzo, opiates. In the ED patient was found to have UA (+) for UTI, CIRA ,rhabdo and elevated LFTs. CT brain and CXR with no acute disease. Patient also was found to have a R peroneal vein thrombosis on US, started on heparin drip. Vascular evaluated patient for suspected stenosis of L external iliac artery with no acute intervention recommended. Nephrology following due to CIRA. 
 
 
 04/10/19    Gretel Corral stated having some swelling on her R labia majora. 10+ ROS reviewed and negative except for positive in HPI. Allergies Allergen Reactions  Dilantin [Phenytoin Sodium Extended] Itching  Keppra [Levetiracetam] Rash Current Facility-Administered Medications Medication Dose Route Frequency  diphenhydrAMINE (BENADRYL) capsule 25 mg  25 mg Oral DIALYSIS PRN  
 lidocaine (XYLOCAINE) 20 mg/mL (2 %) injection  mg   mg SubCUTAneous ONCE  
 HYDROcodone-acetaminophen (NORCO)  mg tablet 1 Tab  1 Tab Oral Q6H PRN  thiamine HCL (B-1) tablet 100 mg  100 mg Oral DAILY  folic acid (FOLVITE) tablet 1 mg  1 mg Oral DAILY  heparin 25,000 units in dextrose 500 mL infusion  18-36 Units/kg/hr IntraVENous TITRATE  sodium chloride (NS) flush 5-10 mL  5-10 mL IntraVENous PRN  
 ondansetron (ZOFRAN) injection 4 mg  4 mg IntraVENous Q6H PRN  
  0.9% sodium chloride infusion  100 mL/hr IntraVENous CONTINUOUS  
 nicotine (NICODERM CQ) 21 mg/24 hr patch 1 Patch  1 Patch TransDERmal DAILY  LORazepam (ATIVAN) injection 1 mg  1 mg IntraVENous Q6H PRN  
 morphine injection 2 mg  2 mg IntraVENous Q4H PRN  
 naloxone (NARCAN) injection 0.4 mg  0.4 mg IntraVENous EVERY 2 MINUTES AS NEEDED There is no immunization history on file for this patient. Objective:  
 
Patient Vitals for the past 24 hrs: 
 Temp Pulse Resp BP SpO2  
04/10/19 1519 98 °F (36.7 °C) (!) 106 16 (!) 141/94 99 % 04/10/19 1210 98.8 °F (37.1 °C) (!) 110 16 (!) 146/95 100 % 04/10/19 1143  (!) 103 16    
04/10/19 1140  (!) 103 17    
04/10/19 1136  (!) 109 14    
04/10/19 1128  93     
04/10/19 1127  (!) 102 13    
04/10/19 1126  99 18    
04/10/19 1125  97 12    
04/10/19 0946 97.7 °F (36.5 °C) (!) 105  (!) 143/93   
04/10/19 0927  (!) 101  133/66   
04/10/19 0905  99  (!) 165/105   
04/10/19 0830  93  (!) 149/99   
04/10/19 0800  93  (!) 152/104   
04/10/19 0734  91  (!) 145/101   
04/10/19 0711  89  (!) 149/101   
04/10/19 0320 98.4 °F (36.9 °C) 100 16 128/89 100 % 19 2352 98.1 °F (36.7 °C) (!) 107 16 136/87 100 % 19 1938 97.9 °F (36.6 °C) (!) 106 16 136/89 99 % Temp (24hrs), Av.2 °F (36.8 °C), Min:97.7 °F (36.5 °C), Max:98.8 °F (37.1 °C) Oxygen Therapy O2 Sat (%): 99 % (04/10/19 1519) Pulse via Oximetry: 127 beats per minute (19) O2 Device: Room air (04/10/19 1230) Oxygen Therapy O2 Sat (%): 99 % (04/10/19 1519) Pulse via Oximetry: 127 beats per minute (19) O2 Device: Room air (04/10/19 1230) Physical Exam: 
General:         Alert, cooperative, no distress HEENT:               NCAT. No obvious deformity. Lungs: No wheezing/rhonchi/rales Cardiovascular:   RRR. No m/r/g. trace pedal edema b/l. Abdomen:       S/nt/nd. Bowel sounds normal. . Skin:         Diffused generalized pain Neurologic:    No gross focal deficit. Psychiatric:         Good mood. Normal affect. Genitals:     Examined at bedside with nurse Vidhya Levin. Some swelling on R labia majora with minimal tenderness, no lesion appreciated. DIAGNOSTIC STUDIES Data Review:  
Recent Results (from the past 24 hour(s)) CK Collection Time: 04/10/19  4:48 AM  
Result Value Ref Range CK 8,594 (H) 21 - 215 U/L  
CBC WITH AUTOMATED DIFF Collection Time: 04/10/19  4:48 AM  
Result Value Ref Range WBC 8.7 4.3 - 11.1 K/uL  
 RBC 3.46 (L) 4.05 - 5.2 M/uL  
 HGB 10.7 (L) 11.7 - 15.4 g/dL HCT 31.0 (L) 35.8 - 46.3 % MCV 89.6 79.6 - 97.8 FL  
 MCH 30.9 26.1 - 32.9 PG  
 MCHC 34.5 31.4 - 35.0 g/dL  
 RDW 13.7 11.9 - 14.6 % PLATELET 959 689 - 539 K/uL MPV 11.1 9.4 - 12.3 FL ABSOLUTE NRBC 0.00 0.0 - 0.2 K/uL  
 DF AUTOMATED NEUTROPHILS 71 43 - 78 % LYMPHOCYTES 17 13 - 44 % MONOCYTES 8 4.0 - 12.0 % EOSINOPHILS 4 0.5 - 7.8 % BASOPHILS 0 0.0 - 2.0 % IMMATURE GRANULOCYTES 1 0.0 - 5.0 %  
 ABS. NEUTROPHILS 6.2 1.7 - 8.2 K/UL  
 ABS. LYMPHOCYTES 1.5 0.5 - 4.6 K/UL  
 ABS. MONOCYTES 0.7 0.1 - 1.3 K/UL  
 ABS. EOSINOPHILS 0.3 0.0 - 0.8 K/UL  
 ABS. BASOPHILS 0.0 0.0 - 0.2 K/UL  
 ABS. IMM. GRANS. 0.1 0.0 - 0.5 K/UL METABOLIC PANEL, COMPREHENSIVE Collection Time: 04/10/19  4:48 AM  
Result Value Ref Range Sodium 137 136 - 145 mmol/L Potassium 3.5 3.5 - 5.1 mmol/L Chloride 106 98 - 107 mmol/L  
 CO2 21 21 - 32 mmol/L Anion gap 10 7 - 16 mmol/L Glucose 85 65 - 100 mg/dL BUN 41 (H) 6 - 23 MG/DL Creatinine 6.55 (H) 0.6 - 1.0 MG/DL  
 GFR est AA 10 (L) >60 ml/min/1.73m2 GFR est non-AA 8 (L) >60 ml/min/1.73m2 Calcium 7.3 (L) 8.3 - 10.4 MG/DL Bilirubin, total 0.3 0.2 - 1.1 MG/DL  
 ALT (SGPT) 231 (H) 12 - 65 U/L  
 AST (SGOT) 436 (H) 15 - 37 U/L Alk. phosphatase 26 (L) 50 - 136 U/L Protein, total 4.5 (L) 6.3 - 8.2 g/dL Albumin 1.5 (L) 3.5 - 5.0 g/dL Globulin 3.0 2.3 - 3.5 g/dL A-G Ratio 0.5 (L) 1.2 - 3.5 PTT Collection Time: 04/10/19  4:48 AM  
Result Value Ref Range aPTT 100.4 (H) 24.7 - 39.8 SEC All Micro Results Procedure Component Value Units Date/Time CULTURE, BLOOD [[id number]] Collected:  04/07/19 2130 Order Status:  Completed Specimen:  Blood Updated:  04/10/19 0636 Special Requests: --     
  LEFT 
ARM Culture result: NO GROWTH 2 DAYS     
 CULTURE, BLOOD [[id number]] Collected:  04/06/19 2032 Order Status:  Completed Specimen:  Blood Updated:  04/10/19 0636 Special Requests: --     
  LEFT 
HAND Culture result: NO GROWTH 4 DAYS     
 CULTURE, URINE [[id number]] Collected:  04/06/19 1857 Order Status:  Completed Specimen:  Cath Urine Updated:  04/09/19 9513 Special Requests: NO SPECIAL REQUESTS Culture result: NO GROWTH 2 DAYS     
 CULTURE, URINE [[id number]] Collected:  04/06/19 2145 Order Status:  Canceled Specimen:  Urine from Clean catch CULTURE, BLOOD [[id number]] Collected:  04/06/19 2000 Order Status:  Canceled Specimen:  Blood Imaging [place] /Studies: CXR Results  (Last 48 hours) None CT Results  (Last 48 hours) None Ir Insert Non Tunl Cvc Over 5 Yrs Result Date: 4/10/2019 Impression: Technically successful tunneled hemodialysis catheter placement. Successful placement of temporary central venous catheter and removal of temporary hemodialysis catheter. Plan: Recover for 1 hour. Catheter is ready for use. Suture should be removed in 2 weeks. Results for orders placed or performed during the hospital encounter of 04/06/19  
2D ECHO COMPLETE ADULT (TTE) W OR WO CONTR Narrative Rag[facility] [address] [facility], [address] [place] 
[phone] Transthoracic Echocardiogram 
2D, M-mode, Doppler, and Color Doppler Patient: Joycelyn Archibald MR #: [id number] : 1990 Age: 33 years Gender: Female Study date: 2019 Account #: [de-identified] Height: 59 in 
Weight: 124.7 lb 
BSA: 1.51 mï¾² Status:Routine Location: 205 BP: 120/ 78 Allergies: PHENYTOIN SODIUM EXTENDED, LEVETIRACETAM 
 
Sonographer:  Layla Hightower [facility] Group:  [facility] Referring Physician:  Rosemarie Renner. Anibal Pimentel MD 
Reading Physician:  Tod Lebron. Barbara Camarena MD [facility] INDICATIONS: PVD. *Per patient- unable to turn onto left side due to blood clot in leg. * PROCEDURE: This was a routine study. A transthoracic echocardiogram was 
performed. The study included complete 2D imaging, M-mode, complete spectral 
Doppler, and color Doppler. Echocardiographic views were limited by  
restricted 
patient mobility and poor acoustic window availability. Image quality was 
adequate. LEFT VENTRICLE: Size was normal. Systolic function was normal. Ejection 
fraction was estimated in the range of 55 % to 60 %. There were no regional 
wall motion abnormalities. Wall thickness was normal. The E/e' ratio was 6.2. Left ventricular diastolic function parameters were normal. 
 
RIGHT VENTRICLE: The size was normal. Systolic function was normal. The 
tricuspid jet envelope definition was inadequate for estimation of RV  
systolic 
pressure. LEFT ATRIUM: Size was normal. 
 
RIGHT ATRIUM: Size was normal. 
 
SYSTEMIC VEINS: IVC: The inferior vena cava was normal in size and course. AORTIC VALVE: The valve was structurally normal, tri-commissural. There was  
no 
evidence for stenosis. There was no insufficiency. MITRAL VALVE: Valve structure was normal. There was no evidence for stenosis. There was trivial regurgitation. TRICUSPID VALVE: The valve structure was normal. There was no evidence for 
stenosis. There was no regurgitation. PULMONIC VALVE: Not well visualized. There was no evidence for stenosis. There 
was trivial regurgitation. PERICARDIUM: There was no pericardial effusion. AORTA: The root exhibited normal size. SUMMARY: 
 
-  Left ventricle: Systolic function was normal. Ejection fraction was 
estimated in the range of 55 % to 60 %. There were no regional wall motion 
abnormalities. SYSTEM MEASUREMENT TABLES 
 
2D mode AoR Diam (2D): 2.5 cm 
LA Dimension (2D): 3 cm Left Atrium Systolic Volume Index; Method of Disks, Biplane; 2D mode;: 15.2 
ml/m2 IVS/LVPW (2D): 1.1 IVSd (2D): 1 cm LVIDd (2D): 3.8 cm LVIDs (2D): 2.5 cm 
LVOT Area (2D): 3.1 cm2 LVPWd (2D): 0.9 cm Tissue Doppler Imaging LV Peak Early Charles Tissue Win; Lateral MA (TDI): 17.2 cm/s LV Peak Early Charles Tissue Win; Medial MA (TDI): 8.2 cm/s Unspecified Scan Mode Peak Grad; Mean; Antegrade Flow: 8 mm[Hg] Vmax; Antegrade Flow: 134 cm/s LVOT Diam: 2 cm 
MV Peak Win/LV Peak Tissue Win E-Wave; Lateral MA: 4 MV Peak Win/LV Peak Tissue Win E-Wave; Medial MA: 8.4 Prepared and signed by 
 
Catherine Camara. Dorene Chowdhury MD [facility] Signed 08-Apr-2019 12:22:39 Labs and Studies from previous 24 hours have been personally reviewed by myself   
ASSESSMENT Active Hospital Problems Diagnosis Date Noted  Encephalopathy, toxic 04/08/2019  Toxic metabolic encephalopathy 50/15/9088  Rhabdomyolysis 04/07/2019  Sepsis ([facility] [address] 04/06/2019  Hepatitis 04/06/2019  Substance use disorder 04/06/2019  Hip pain 04/06/2019  Hyponatremia 04/06/2019  CIRA (acute kidney injury) ([facility] [address] 04/06/2019  UTI (urinary tract infection) 04/06/2019  Hypertension 04/06/2019  Asthma 05/19/2016 Last Assessment & Plan:  
Albuterol prn  Borderline personality disorder ([facility] [address] 05/19/2016 Last Assessment & Plan:  
Continue Prozac, Seroquel, and trazodone Hospital Problems as of 4/10/2019 Date Reviewed: 4/7/2019 Codes Class Noted - Resolved POA Encephalopathy, toxic ICD-10-CM: G92 ICD-9-CM: 349.82  4/8/2019 - Present Unknown Toxic metabolic encephalopathy [id number]: U89 ICD-9-CM: 349.82  4/8/2019 - Present Unknown Rhabdomyolysis ICD-10-CM: E30.13 ICD-9-CM: 728.88  4/7/2019 - Present Yes * (Principal) Sepsis ([place] [address] ICD-10-CM: A41.9 ICD-9-CM: 038.9, 995.91  4/6/2019 - Present Yes Hepatitis ICD-10-CM: K75.9 ICD-9-CM: 573.3  4/6/2019 - Present Yes Substance use disorder (Chronic) ICD-10-CM: F19.90 ICD-9-CM: 305.90  4/6/2019 - Present Yes Hip pain ICD-10-CM: M25.559 ICD-9-CM: 719.45  4/6/2019 - Present Yes Hyponatremia ICD-10-CM: E87.1 ICD-9-CM: 276.1  4/6/2019 - Present Yes CIRA (acute kidney injury) ([place] [address] ICD-10-CM: N17.9 ICD-9-CM: 584.9  4/6/2019 - Present Yes  
   
 UTI (urinary tract infection) ICD-10-CM: N39.0 ICD-9-CM: 599.0  4/6/2019 - Present Yes Hypertension ICD-10-CM: I10 
ICD-9-CM: 401.9  4/6/2019 - Present Yes Asthma (Chronic) ICD-10-CM: J45.909 ICD-9-CM: 493.90  5/19/2016 - Present Yes Overview Signed 4/7/2019  7:19 AM by GARRET Hung Last Assessment & Plan:  
Albuterol prn Borderline personality disorder ([facility] (Chronic) ICD-10-CM: F60.3 ICD-9-CM: 301.83  5/19/2016 - Present Yes Overview Signed 4/7/2019  7:19 AM by GARRET Hung Last Assessment & Plan:  
Continue Prozac, Seroquel, and trazodone A/P: 
 
-Sepsis secondary to ? UTI Patient has been afebrile and WBC trended down Blood and urine cultures neg so far ID evaluation appreciated. Findings likely secondary to rhabdo Abxs stopped 
 
 
-CIRA Likely secondary to rhabdo Renal function worsening requiring HD Patient had second HD today Nephrology eval follow up appreciated. No signs of recovery yet. -Rhabdo CPK trending down Decrease IVFs to 75 to prevent fluid overload - Toxic encephalopathy Likely secondary to amphetamine Resolved 
 
-Rt peroneal vein dvt Cont heparin drip 
 
-Elevated LFTs Likely due to rhabdo Trending down viral hepatitis panel and HIV neg Cont to trend 
 
-R labia majora swelling Could be secondary to ? IVF and RLE DVT No lesion noted GC and chlamydia pending DVT Prophylaxis: heparin CODE Status: Full Plan of Care Discussed with: patient. Care team. 
 
 
Denise Cee MD 
04/10/19

## 2019-04-10 NOTE — PROGRESS NOTES
TRANSFER - OUT REPORT: 
 
Verbal report given to Peter Melchor RN(name) on Sheryl Rodriguez  being transferred to 2nd floor(unit) for routine progression of care Report consisted of patients Situation, Background, Assessment and  
Recommendations(SBAR). Information from the following report(s) Procedure Summary and MAR was reviewed with the receiving nurse. Lines:  
Triple Lumen Triple lumen CVC 04/10/19 Right Internal jugular (Active) Central Line Being Utilized Yes 4/10/2019 11:35 AM  
Criteria for Appropriate Use Long term IV/antibiotic administration 4/10/2019 11:35 AM  
Site Assessment Clean, dry, & intact 4/10/2019 11:35 AM  
Infiltration Assessment 0 4/10/2019 11:35 AM  
Date of Last Dressing Change 04/10/19 4/10/2019 11:35 AM  
Dressing Status Clean, dry, & intact;New;Occlusive 4/10/2019 11:35 AM  
Dressing Type Transparent;Disk with Chlorhexadine gluconate (CHG) 4/10/2019 11:35 AM  
Proximal Hub Color/Line Status Flushed 4/10/2019 11:35 AM  
Positive Blood Return (Medial Site) Yes 4/10/2019 11:35 AM  
Positive Blood Return (Lateral Site) Yes 4/10/2019 11:35 AM  
Positive Blood Return (Site #3) Yes 4/10/2019 11:35 AM  
   
Peripheral IV 04/06/19 Right Antecubital (Active) Site Assessment Clean, dry, & intact 4/10/2019  4:30 AM  
Phlebitis Assessment 0 4/10/2019  4:30 AM  
Infiltration Assessment 0 4/10/2019  4:30 AM  
Dressing Status Clean, dry, & intact 4/10/2019  4:30 AM  
Dressing Type Tape;Transparent 4/10/2019  4:30 AM  
Hub Color/Line Status Patent 4/9/2019  8:40 PM  
  
 
Opportunity for questions and clarification was provided. Patient transported with: 
 [employer]

## 2019-04-10 NOTE — PROGRESS NOTES
IR Nurse Pre-Procedure Checklist Part 2 Consent signed: Yes 
 
H&P complete:  Yes Antibiotics: Not applicable Airway/Mallampati Done: Not applicable Shaved: Not applicable Pregnancy Form:Yes Patient Position: Yes MD Side: Yes Biopsy Worksheet: Not applicable Specimen Medium: Not applicable

## 2019-04-10 NOTE — PROGRESS NOTES
[facility] PROGRESS NOTE Follow up for: CIRA Subjective:  
Patient seen and examined. Chart, notes, labs, imaging, results all reviewed. Seen on dialysis. Goal UF 1 kg. Catheter functioning poorly with only 100-150 ml BFR. Denies complaints other than total body pain. /105. Denies sob, cp, n/v. Plan is to exchange catheter in IR today. ROS: 
Gen - no fever, no chills, appetite okay CV - no chest pain, no orthopnea Lung - no shortness of breath, no cough Abd - no tenderness, no nausea, no vomiting Ext - no edema Objective:  
Exam: 
Vitals:  
 04/10/19 0800 04/10/19 0830 04/10/19 0905 04/10/19 1952 BP: (!) 152/104 (!) 149/99 (!) 165/105 133/66 Pulse: 93 93 99 (!) 101 Resp:      
Temp:      
SpO2:      
Weight:      
Height:      
 
 
 
Intake/Output Summary (Last 24 hours) at 4/10/2019 5662 Last data filed at 4/10/2019 7397 Gross per 24 hour Intake 1792 ml Output 2725 ml Net -933 ml  
 
 
Current Facility-Administered Medications Medication Dose Route Frequency  diphenhydrAMINE (BENADRYL) capsule 25 mg  25 mg Oral DIALYSIS PRN  thiamine HCL (B-1) tablet 100 mg  100 mg Oral DAILY  folic acid (FOLVITE) tablet 1 mg  1 mg Oral DAILY  heparin 25,000 units in dextrose 500 mL infusion  18-36 Units/kg/hr IntraVENous TITRATE  sodium chloride (NS) flush 5-10 mL  5-10 mL IntraVENous PRN  
 ondansetron (ZOFRAN) injection 4 mg  4 mg IntraVENous Q6H PRN  
 0.9% sodium chloride infusion  100 mL/hr IntraVENous CONTINUOUS  
 nicotine (NICODERM CQ) 21 mg/24 hr patch 1 Patch  1 Patch TransDERmal DAILY  LORazepam (ATIVAN) injection 1 mg  1 mg IntraVENous Q6H PRN  
 morphine injection 2 mg  2 mg IntraVENous Q4H PRN  
 naloxone (NARCAN) injection 0.4 mg  0.4 mg IntraVENous EVERY 2 MINUTES AS NEEDED  
 
 
EXAM 
GEN - Alert, oriented, in no distress CV - S1, S2, RRR, no rub, murmur, or gallop Lung - clear to auscultation bilaterally Abd - soft, nontender, BS present Ext - no edema Right IJ temp dialysis catheter Recent Labs 04/10/19 
0448 04/09/19 
0550 04/08/19 
2213 WBC 8.7 12.3* 17.0*  
HGB 10.7* 11.2* 12.3 HCT 31.0* 34.3* 37.2  140* 147* Recent Labs 04/10/19 
0448 04/09/19 
0550 04/08/19 
2424  134* 134* K 3.5 4.1 4.5  
 108* 106 CO2 21 12* 14* BUN 41* 53* 44* CREA 6.55* 6.97* 5.31* CA 7.3* 6.8* 6.9*  
GLU 85 82 84 Assessment and Plan: This is a 32 yo WF with rhabdomyolysis likely related to amphetamine use. Now with CIRA likely ATN 
- s/p temp cath on 4/8 and first HD 4/9 
- second HD today. Catheter with poor function. Plan to replace catheter today in IR and HD again in am 
- no s/o recovery yet. Follow labs and UOP ELLYN GarciaP Pt examined. Chart reviewed and agree with Ed Banegas' note

## 2019-04-10 NOTE — PROCEDURES
Department of Interventional Radiology  [phone]        Interventional Radiology Brief Procedure Note    Patient: Gussie Claude MRN: [id number]  SSN: [id number]    YOB: 1990  Age: 33 y.o. Sex: female      Date of Procedure: 4/10/2019    Pre-Procedure Diagnosis: CIRA, rhabdomyolysis, substance abuse, DVT    Post-Procedure Diagnosis: SAME    Procedure(s): Temporary Central Venous Catheter  Tunneled Central Venous Catheter    Brief Description of Procedure: US, fluoro guided right IJ tunneled hemodialysis catheter placed, right IJ temporary CVC placed, temporary CVC removed    Performed By: Joseph Camarillo PA-C     Assistants: None    Anesthesia:Lidocaine    Estimated Blood Loss: Less than 10ml    Specimens:  None    Implants:  Tunnelled Hemodialysis Catheter    Findings: catheter tip in right atrium     Complications: None    Recommendations: ok to use catheters. Ok to resume heparin infusion in 4 hours is site remains clean and dry.      Follow Up: prn    Signed By: Joseph Camarillo PA-C     April 10, 2019

## 2019-04-11 ENCOUNTER — APPOINTMENT (OUTPATIENT)
Dept: ULTRASOUND IMAGING | Age: 29
DRG: 871 | End: 2019-04-11
Attending: INTERNAL MEDICINE
Payer: SELF-PAY

## 2019-04-11 LAB
ALBUMIN SERPL-MCNC: 1.4 G/DL (ref 3.5–5)
ALBUMIN/GLOB SERPL: 0.5 {RATIO} (ref 1.2–3.5)
ALP SERPL-CCNC: 28 U/L (ref 50–136)
ALT SERPL-CCNC: 180 U/L (ref 12–65)
ANION GAP SERPL CALC-SCNC: 10 MMOL/L (ref 7–16)
APTT PPP: 77.1 SEC (ref 24.7–39.8)
AST SERPL-CCNC: 227 U/L (ref 15–37)
BACTERIA SPEC CULT: NORMAL
BASOPHILS # BLD: 0 K/UL (ref 0–0.2)
BASOPHILS NFR BLD: 0 % (ref 0–2)
BILIRUB SERPL-MCNC: 0.3 MG/DL (ref 0.2–1.1)
BUN SERPL-MCNC: 42 MG/DL (ref 6–23)
C TRACH RRNA SPEC QL NAA+PROBE: NEGATIVE
CALCIUM SERPL-MCNC: 7.5 MG/DL (ref 8.3–10.4)
CHLORIDE SERPL-SCNC: 105 MMOL/L (ref 98–107)
CK SERPL-CCNC: 4747 U/L (ref 21–215)
CO2 SERPL-SCNC: 21 MMOL/L (ref 21–32)
CREAT SERPL-MCNC: 7.03 MG/DL (ref 0.6–1)
DIFFERENTIAL METHOD BLD: ABNORMAL
EOSINOPHIL # BLD: 0.6 K/UL (ref 0–0.8)
EOSINOPHIL NFR BLD: 6 % (ref 0.5–7.8)
ERYTHROCYTE [DISTWIDTH] IN BLOOD BY AUTOMATED COUNT: 13.8 % (ref 11.9–14.6)
GLOBULIN SER CALC-MCNC: 2.8 G/DL (ref 2.3–3.5)
GLUCOSE SERPL-MCNC: 86 MG/DL (ref 65–100)
HCT VFR BLD AUTO: 30.5 % (ref 35.8–46.3)
HGB BLD-MCNC: 10.3 G/DL (ref 11.7–15.4)
IMM GRANULOCYTES # BLD AUTO: 0.1 K/UL (ref 0–0.5)
IMM GRANULOCYTES NFR BLD AUTO: 1 % (ref 0–5)
LYMPHOCYTES # BLD: 1.5 K/UL (ref 0.5–4.6)
LYMPHOCYTES NFR BLD: 15 % (ref 13–44)
MCH RBC QN AUTO: 30.7 PG (ref 26.1–32.9)
MCHC RBC AUTO-ENTMCNC: 33.8 G/DL (ref 31.4–35)
MCV RBC AUTO: 91 FL (ref 79.6–97.8)
MONOCYTES # BLD: 0.9 K/UL (ref 0.1–1.3)
MONOCYTES NFR BLD: 9 % (ref 4–12)
N GONORRHOEA RRNA SPEC QL NAA+PROBE: NEGATIVE
NEUTS SEG # BLD: 7 K/UL (ref 1.7–8.2)
NEUTS SEG NFR BLD: 69 % (ref 43–78)
NRBC # BLD: 0 K/UL (ref 0–0.2)
PLATELET # BLD AUTO: 165 K/UL (ref 150–450)
PMV BLD AUTO: 10.7 FL (ref 9.4–12.3)
POTASSIUM SERPL-SCNC: 3.7 MMOL/L (ref 3.5–5.1)
PROT SERPL-MCNC: 4.2 G/DL (ref 6.3–8.2)
RBC # BLD AUTO: 3.35 M/UL (ref 4.05–5.2)
SERVICE CMNT-IMP: NORMAL
SODIUM SERPL-SCNC: 136 MMOL/L (ref 136–145)
SPECIMEN SOURCE: NORMAL
WBC # BLD AUTO: 10.1 K/UL (ref 4.3–11.1)

## 2019-04-11 PROCEDURE — 74011250636 HC RX REV CODE- 250/636: Performed by: INTERNAL MEDICINE

## 2019-04-11 PROCEDURE — 74011250637 HC RX REV CODE- 250/637: Performed by: INTERNAL MEDICINE

## 2019-04-11 PROCEDURE — 85025 COMPLETE CBC W/AUTO DIFF WBC: CPT

## 2019-04-11 PROCEDURE — 74011250637 HC RX REV CODE- 250/637: Performed by: FAMILY MEDICINE

## 2019-04-11 PROCEDURE — 93971 EXTREMITY STUDY: CPT

## 2019-04-11 PROCEDURE — 90935 HEMODIALYSIS ONE EVALUATION: CPT

## 2019-04-11 PROCEDURE — 77030020263 HC SOL INJ SOD CL0.9% LFCR 1000ML

## 2019-04-11 PROCEDURE — 82550 ASSAY OF CK (CPK): CPT

## 2019-04-11 PROCEDURE — 65660000000 HC RM CCU STEPDOWN

## 2019-04-11 PROCEDURE — 85730 THROMBOPLASTIN TIME PARTIAL: CPT

## 2019-04-11 PROCEDURE — 80053 COMPREHEN METABOLIC PANEL: CPT

## 2019-04-11 PROCEDURE — 65270000029 HC RM PRIVATE

## 2019-04-11 RX ORDER — AMLODIPINE BESYLATE 5 MG/1
5 TABLET ORAL ONCE
Status: COMPLETED | OUTPATIENT
Start: 2019-04-11 | End: 2019-04-11

## 2019-04-11 RX ORDER — HYDRALAZINE HYDROCHLORIDE 20 MG/ML
10 INJECTION INTRAMUSCULAR; INTRAVENOUS
Status: DISCONTINUED | OUTPATIENT
Start: 2019-04-11 | End: 2019-04-24 | Stop reason: HOSPADM

## 2019-04-11 RX ORDER — AMLODIPINE BESYLATE 5 MG/1
5 TABLET ORAL DAILY
Status: DISCONTINUED | OUTPATIENT
Start: 2019-04-11 | End: 2019-04-11

## 2019-04-11 RX ORDER — AMLODIPINE BESYLATE 10 MG/1
10 TABLET ORAL DAILY
Status: DISCONTINUED | OUTPATIENT
Start: 2019-04-12 | End: 2019-04-23

## 2019-04-11 RX ADMIN — Medication 100 MG: at 09:42

## 2019-04-11 RX ADMIN — AMLODIPINE BESYLATE 5 MG: 5 TABLET ORAL at 12:06

## 2019-04-11 RX ADMIN — LORAZEPAM 1 MG: 2 INJECTION INTRAMUSCULAR; INTRAVENOUS at 22:58

## 2019-04-11 RX ADMIN — MORPHINE SULFATE 2 MG: 2 INJECTION, SOLUTION INTRAMUSCULAR; INTRAVENOUS at 04:55

## 2019-04-11 RX ADMIN — HEPARIN SODIUM 20 UNITS/KG/HR: 5000 INJECTION, SOLUTION INTRAVENOUS at 07:01

## 2019-04-11 RX ADMIN — MORPHINE SULFATE 2 MG: 2 INJECTION, SOLUTION INTRAMUSCULAR; INTRAVENOUS at 12:06

## 2019-04-11 RX ADMIN — AMLODIPINE BESYLATE 5 MG: 5 TABLET ORAL at 09:42

## 2019-04-11 RX ADMIN — HYDROCODONE BITARTRATE AND ACETAMINOPHEN 1 TABLET: 10; 325 TABLET ORAL at 02:10

## 2019-04-11 RX ADMIN — ONDANSETRON 4 MG: 2 INJECTION INTRAMUSCULAR; INTRAVENOUS at 22:58

## 2019-04-11 RX ADMIN — DIPHENHYDRAMINE HYDROCHLORIDE 25 MG: 25 CAPSULE ORAL at 19:59

## 2019-04-11 RX ADMIN — DIPHENHYDRAMINE HYDROCHLORIDE 25 MG: 25 CAPSULE ORAL at 09:16

## 2019-04-11 RX ADMIN — LORAZEPAM 1 MG: 2 INJECTION INTRAMUSCULAR; INTRAVENOUS at 09:42

## 2019-04-11 RX ADMIN — HYDROCODONE BITARTRATE AND ACETAMINOPHEN 1 TABLET: 10; 325 TABLET ORAL at 15:30

## 2019-04-11 RX ADMIN — DIPHENHYDRAMINE HYDROCHLORIDE 25 MG: 25 CAPSULE ORAL at 14:15

## 2019-04-11 RX ADMIN — MORPHINE SULFATE 2 MG: 2 INJECTION, SOLUTION INTRAMUSCULAR; INTRAVENOUS at 17:51

## 2019-04-11 RX ADMIN — MORPHINE SULFATE 2 MG: 2 INJECTION, SOLUTION INTRAMUSCULAR; INTRAVENOUS at 21:39

## 2019-04-11 RX ADMIN — HYDROCODONE BITARTRATE AND ACETAMINOPHEN 1 TABLET: 10; 325 TABLET ORAL at 09:42

## 2019-04-11 RX ADMIN — FOLIC ACID 1 MG: 1 TABLET ORAL at 09:42

## 2019-04-11 RX ADMIN — HEPARIN SODIUM 20 UNITS/KG/HR: 5000 INJECTION, SOLUTION INTRAVENOUS at 12:22

## 2019-04-11 RX ADMIN — HYDROCODONE BITARTRATE AND ACETAMINOPHEN 1 TABLET: 10; 325 TABLET ORAL at 22:58

## 2019-04-11 NOTE — DIALYSIS
TRANSFER IN - DIALYSIS Received patient in dialysis unit  from [facility] (unit) for ordered procedure. Consent verified for renal replacement therapy. Patient alert and vital signs stable. /94 P 101 Hemodialysis initiated using Right CVC. Aspirated and flushed both ports without difficulty. Dressing clean, dry and intact. Machine settings per MD order. Heparin 0 unit bolus and 0 units/hr. Will monitor during treatment.

## 2019-04-11 NOTE — DIALYSIS
TRANSFER OUT- DIALYSIS Hemodialysis treatment completed without complications. Patient alert and VS stable  /105  P 104   
 
 2.0 Kgs removed. Flushed both ports with 10 mL of NS.  CVC dressing clean, dry, and intact, tego caps intact, bilateral lumens wrapped with 4x4 gauze. Meds given-Benedryl. Patient to 205 after dialysis.

## 2019-04-11 NOTE — PROGRESS NOTES
Dispo update:  Ms. Giordano Semen not medically stable for discharge (e.g., renal is following, on HD). If needs permanent HD, must consider her \"self pay\" status. Will follow along and assist with discharge planning.

## 2019-04-11 NOTE — PROGRESS NOTES
Hospitalist Progress Note 2019 Admit Date: 2019  4:59 PM  
NAME: Robson Ambrosio :  1990 DOS:              19 MRN:  [id number] Attending: Emma Pabon MD 
PCP:  None Treatment Team: Attending Provider: Melo Pendleton MD; Consulting Provider: Araceli Vegas MD; Care Manager: Arun Bermudez RN; Utilization Review: aNt Martell RN Full Code SUBJECTIVE: As previously documented: 34 yo female with PMH of polysubstance use who is evaluated with malaise and bilateral hip pain. She has UDS showing amphetamines, benzo, opiates. In the ED patient was found to have UA (+) for UTI, CIRA ,rhabdo and elevated LFTs. CT brain and CXR with no acute disease. Patient also was found to have a R peroneal vein thrombosis on US, started on heparin drip. Vascular evaluated patient for suspected stenosis of L external iliac artery with no acute intervention recommended. Nephrology following due to CIRA, no renal recovery so far requiring HD.  
 
 
 19    Robson Ambrosio stated pain all over her body is better controlled. Mother/ mother in law and ?boyfriend at bedside, had a extensive discussion with them, all questions answered. 10+ ROS reviewed and negative except for positive in HPI. Allergies Allergen Reactions  Dilantin [Phenytoin Sodium Extended] Itching  Keppra [Levetiracetam] Rash Current Facility-Administered Medications Medication Dose Route Frequency  [START ON 2019] amLODIPine (NORVASC) tablet 10 mg  10 mg Oral DAILY  amLODIPine (NORVASC) tablet 5 mg  5 mg Oral ONCE  hydrALAZINE (APRESOLINE) 20 mg/mL injection 10 mg  10 mg IntraVENous Q6H PRN  
 diphenhydrAMINE (BENADRYL) capsule 25 mg  25 mg Oral DIALYSIS PRN  
 HYDROcodone-acetaminophen (NORCO)  mg tablet 1 Tab  1 Tab Oral Q6H PRN  
 diphenhydrAMINE (BENADRYL) capsule 25 mg  25 mg Oral Q6H PRN  thiamine HCL (B-1) tablet 100 mg  100 mg Oral DAILY  folic acid (FOLVITE) tablet 1 mg  1 mg Oral DAILY  heparin 25,000 units in dextrose 500 mL infusion  18-36 Units/kg/hr IntraVENous TITRATE  sodium chloride (NS) flush 5-10 mL  5-10 mL IntraVENous PRN  
 ondansetron (ZOFRAN) injection 4 mg  4 mg IntraVENous Q6H PRN  
 0.9% sodium chloride infusion  75 mL/hr IntraVENous CONTINUOUS  
 nicotine (NICODERM CQ) 21 mg/24 hr patch 1 Patch  1 Patch TransDERmal DAILY  LORazepam (ATIVAN) injection 1 mg  1 mg IntraVENous Q6H PRN  
 morphine injection 2 mg  2 mg IntraVENous Q4H PRN  
 naloxone (NARCAN) injection 0.4 mg  0.4 mg IntraVENous EVERY 2 MINUTES AS NEEDED There is no immunization history on file for this patient. Objective:  
 
Patient Vitals for the past 24 hrs: 
 Temp Pulse Resp BP SpO2  
19 1038 97.3 °F (36.3 °C) (!) 107 16 (!) 137/93 100 % 19 1004  (!) 104  (!) 148/105   
19 0945  (!) 102  (!) 148/100   
19 0914  (!) 114 16 (!) 149/101   
19 0832  (!) 104 16 (!) 134/101   
19 0808  (!) 108 18 (!) 143/99   
19 0746  (!) 108 16 (!) 149/91   
19 0714  (!) 101 16 (!) 144/94   
19 0310 97.5 °F (36.4 °C) (!) 101 18 (!) 150/95 100 % 04/10/19 2315 98.2 °F (36.8 °C) (!) 101 19 145/90 99 % 04/10/19 1947 97.9 °F (36.6 °C) 100 17 136/90 99 % 04/10/19 1519 98 °F (36.7 °C) (!) 106 16 (!) 141/94 99 % 04/10/19 1210 98.8 °F (37.1 °C) (!) 110 16 (!) 146/95 100 % 04/10/19 1143  (!) 103 16    
04/10/19 1140  (!) 103 17    
04/10/19 1136  (!) 109 14    
04/10/19 1128  93     
04/10/19 1127  (!) 102 13    
04/10/19 1126  99 18    
04/10/19 1125  97 12   Temp (24hrs), Av °F (36.7 °C), Min:97.3 °F (36.3 °C), Max:98.8 °F (37.1 °C) Oxygen Therapy O2 Sat (%): 100 % (19 1038) Pulse via Oximetry: 127 beats per minute (19 2119) O2 Device: Room air (04/10/19 1230) Oxygen Therapy O2 Sat (%): 100 % (19 1038) Pulse via Oximetry: 127 beats per minute (04/06/19 2119) O2 Device: Room air (04/10/19 1230) Physical Exam: 
General:         Alert, cooperative, no distress HEENT:               NCAT. No obvious deformity. Lungs: No wheezing/rhonchi/rales Cardiovascular:   RRR. No m/r/g. trace pedal edema b/l. Abdomen:       S/nt/nd. Bowel sounds normal. .  
Skin:         Diffused generalized pain Neurologic:    No gross focal deficit. Psychiatric:         Good mood. Normal affect. Extremities:         RLE with some tenderness. R  Leg looks about the same size compare to R 
 
 
DIAGNOSTIC STUDIES Data Review:  
Recent Results (from the past 24 hour(s)) CK Collection Time: 04/11/19  4:36 AM  
Result Value Ref Range CK 4,747 (H) 21 - 215 U/L  
CBC WITH AUTOMATED DIFF Collection Time: 04/11/19  4:36 AM  
Result Value Ref Range WBC 10.1 4.3 - 11.1 K/uL  
 RBC 3.35 (L) 4.05 - 5.2 M/uL  
 HGB 10.3 (L) 11.7 - 15.4 g/dL HCT 30.5 (L) 35.8 - 46.3 % MCV 91.0 79.6 - 97.8 FL  
 MCH 30.7 26.1 - 32.9 PG  
 MCHC 33.8 31.4 - 35.0 g/dL  
 RDW 13.8 11.9 - 14.6 % PLATELET 774 477 - 833 K/uL MPV 10.7 9.4 - 12.3 FL ABSOLUTE NRBC 0.00 0.0 - 0.2 K/uL  
 DF AUTOMATED NEUTROPHILS 69 43 - 78 % LYMPHOCYTES 15 13 - 44 % MONOCYTES 9 4.0 - 12.0 % EOSINOPHILS 6 0.5 - 7.8 % BASOPHILS 0 0.0 - 2.0 % IMMATURE GRANULOCYTES 1 0.0 - 5.0 %  
 ABS. NEUTROPHILS 7.0 1.7 - 8.2 K/UL  
 ABS. LYMPHOCYTES 1.5 0.5 - 4.6 K/UL  
 ABS. MONOCYTES 0.9 0.1 - 1.3 K/UL  
 ABS. EOSINOPHILS 0.6 0.0 - 0.8 K/UL  
 ABS. BASOPHILS 0.0 0.0 - 0.2 K/UL  
 ABS. IMM. GRANS. 0.1 0.0 - 0.5 K/UL  
PTT Collection Time: 04/11/19  4:36 AM  
Result Value Ref Range aPTT 77.1 (H) 24.7 - 39.8 SEC METABOLIC PANEL, COMPREHENSIVE Collection Time: 04/11/19  4:36 AM  
Result Value Ref Range Sodium 136 136 - 145 mmol/L Potassium 3.7 3.5 - 5.1 mmol/L  Chloride 105 98 - 107 mmol/L  
 CO2 21 21 - 32 mmol/L  
 Anion gap 10 7 - 16 mmol/L Glucose 86 65 - 100 mg/dL BUN 42 (H) 6 - 23 MG/DL Creatinine 7.03 (H) 0.6 - 1.0 MG/DL  
 GFR est AA 9 (L) >60 ml/min/1.73m2 GFR est non-AA 7 (L) >60 ml/min/1.73m2 Calcium 7.5 (L) 8.3 - 10.4 MG/DL Bilirubin, total 0.3 0.2 - 1.1 MG/DL  
 ALT (SGPT) 180 (H) 12 - 65 U/L  
 AST (SGOT) 227 (H) 15 - 37 U/L Alk. phosphatase 28 (L) 50 - 136 U/L Protein, total 4.2 (L) 6.3 - 8.2 g/dL Albumin 1.4 (L) 3.5 - 5.0 g/dL Globulin 2.8 2.3 - 3.5 g/dL A-G Ratio 0.5 (L) 1.2 - 3.5 All Micro Results Procedure Component Value Units Date/Time CULTURE, BLOOD [[id number]] Collected:  04/07/19 2130 Order Status:  Completed Specimen:  Blood Updated:  04/11/19 5976 Special Requests: --     
  LEFT 
ARM Culture result: NO GROWTH 3 DAYS     
 CULTURE, BLOOD [[id number]] Collected:  04/06/19 2032 Order Status:  Completed Specimen:  Blood Updated:  04/11/19 3083 Special Requests: --     
  LEFT 
HAND Culture result: NO GROWTH 5 DAYS     
 CULTURE, URINE [[id number]] Collected:  04/06/19 1857 Order Status:  Completed Specimen:  Cath Urine Updated:  04/09/19 8520 Special Requests: NO SPECIAL REQUESTS Culture result: NO GROWTH 2 DAYS     
 CULTURE, URINE [[id number]] Collected:  04/06/19 2145 Order Status:  Canceled Specimen:  Urine from Clean catch CULTURE, BLOOD [[id number]] Collected:  04/06/19 2000 Order Status:  Canceled Specimen:  Blood Imaging Aleksandr Schmitt /Studies: CXR Results  (Last 48 hours) None CT Results  (Last 48 hours) None Ir Insert Non Tunl Cvc Over 5 Yrs Result Date: 4/10/2019 Impression: Technically successful tunneled hemodialysis catheter placement. Successful placement of temporary central venous catheter and removal of temporary hemodialysis catheter. Plan: Recover for 1 hour. Catheter is ready for use. Suture should be removed in 2 weeks. Results for orders placed or performed during the hospital encounter of 19  
2D ECHO COMPLETE ADULT (TTE) W OR WO CONTR Narrative [facility] [address] [place], [address] [facility] 
[phone] Transthoracic Echocardiogram 
2D, M-mode, Doppler, and Color Doppler Patient: Denise Valadez MR #: [id number] : 1990 Age: 33 years Gender: Female Study date: 2019 Account #: [de-identified] Height: 59 in 
Weight: 124.7 lb 
BSA: 1.51 mï¾² Status:Routine Location: 205 BP: 120/ 78 Allergies: PHENYTOIN SODIUM EXTENDED, LEVETIRACETAM 
 
Sonographer:  Jaquelin Reis RDCS Group:  [facility] Referring Physician:  Jennifer Hathaway. Alirio Siddiqui MD 
Reading Physician:  Naomie Valenzuela. Jennifer Everett MD [facility] INDICATIONS: PVD. *Per patient- unable to turn onto left side due to blood clot in leg. * PROCEDURE: This was a routine study. A transthoracic echocardiogram was 
performed. The study included complete 2D imaging, M-mode, complete spectral 
Doppler, and color Doppler. Echocardiographic views were limited by  
restricted 
patient mobility and poor acoustic window availability. Image quality was 
adequate. LEFT VENTRICLE: Size was normal. Systolic function was normal. Ejection 
fraction was estimated in the range of 55 % to 60 %. There were no regional 
wall motion abnormalities. Wall thickness was normal. The E/e' ratio was 6.2. Left ventricular diastolic function parameters were normal. 
 
RIGHT VENTRICLE: The size was normal. Systolic function was normal. The 
tricuspid jet envelope definition was inadequate for estimation of RV  
systolic 
pressure. LEFT ATRIUM: Size was normal. 
 
RIGHT ATRIUM: Size was normal. 
 
SYSTEMIC VEINS: IVC: The inferior vena cava was normal in size and course. AORTIC VALVE: The valve was structurally normal, tri-commissural. There was  
no 
evidence for stenosis. There was no insufficiency. MITRAL VALVE: Valve structure was normal. There was no evidence for stenosis. There was trivial regurgitation. TRICUSPID VALVE: The valve structure was normal. There was no evidence for 
stenosis. There was no regurgitation. PULMONIC VALVE: Not well visualized. There was no evidence for stenosis. There 
was trivial regurgitation. PERICARDIUM: There was no pericardial effusion. AORTA: The root exhibited normal size. SUMMARY: 
 
-  Left ventricle: Systolic function was normal. Ejection fraction was 
estimated in the range of 55 % to 60 %. There were no regional wall motion 
abnormalities. SYSTEM MEASUREMENT TABLES 
 
2D mode AoR Diam (2D): 2.5 cm 
LA Dimension (2D): 3 cm Left Atrium Systolic Volume Index; Method of Disks, Biplane; 2D mode;: 15.2 
ml/m2 IVS/LVPW (2D): 1.1 IVSd (2D): 1 cm LVIDd (2D): 3.8 cm LVIDs (2D): 2.5 cm 
LVOT Area (2D): 3.1 cm2 LVPWd (2D): 0.9 cm Tissue Doppler Imaging LV Peak Early Charles Tissue Win; Lateral MA (TDI): 17.2 cm/s LV Peak Early Charles Tissue Win; Medial MA (TDI): 8.2 cm/s Unspecified Scan Mode Peak Grad; Mean; Antegrade Flow: 8 mm[Hg] Vmax; Antegrade Flow: 134 cm/s LVOT Diam: 2 cm 
MV Peak Win/LV Peak Tissue Win E-Wave; Lateral MA: 4 MV Peak Win/LV Peak Tissue Win E-Wave; Medial MA: 8.4 Prepared and signed by 
 
Chata Perez. De Cameron MD [facility] Signed 08-Apr-2019 12:22:39 Labs and Studies from previous 24 hours have been personally reviewed by myself   
ASSESSMENT Active Hospital Problems Diagnosis Date Noted  Encephalopathy, toxic 04/08/2019  Toxic metabolic encephalopathy 58/32/8211  Rhabdomyolysis 04/07/2019  Sepsis ([facility] 04/06/2019  Hepatitis 04/06/2019  Substance use disorder 04/06/2019  Hip pain 04/06/2019  Hyponatremia 04/06/2019  CIRA (acute kidney injury) ([facility] 04/06/2019  UTI (urinary tract infection) 04/06/2019  Hypertension 04/06/2019  Asthma 05/19/2016 Last Assessment & Plan: Albuterol prn  Borderline personality disorder ([facility] 05/19/2016 Last Assessment & Plan:  
Continue Prozac, Seroquel, and trazodone Hospital Problems as of 4/11/2019 Date Reviewed: 4/7/2019 Codes Class Noted - Resolved POA Encephalopathy, toxic ICD-10-CM: G92 ICD-9-CM: 349.82  4/8/2019 - Present Unknown Toxic metabolic encephalopathy [id number]: X88 ICD-9-CM: 349.82  4/8/2019 - Present Unknown Rhabdomyolysis ICD-10-CM: F98.85 ICD-9-CM: 728.88  4/7/2019 - Present Yes * (Principal) Sepsis ([facility] ICD-10-CM: A41.9 ICD-9-CM: 038.9, 995.91  4/6/2019 - Present Yes Hepatitis ICD-10-CM: K75.9 ICD-9-CM: 573.3  4/6/2019 - Present Yes Substance use disorder (Chronic) ICD-10-CM: F19.90 ICD-9-CM: 305.90  4/6/2019 - Present Yes Hip pain ICD-10-CM: M25.559 ICD-9-CM: 719.45  4/6/2019 - Present Yes Hyponatremia ICD-10-CM: E87.1 ICD-9-CM: 276.1  4/6/2019 - Present Yes CIRA (acute kidney injury) ([facility] ICD-10-CM: N17.9 ICD-9-CM: 584.9  4/6/2019 - Present Yes  
   
 UTI (urinary tract infection) ICD-10-CM: N39.0 ICD-9-CM: 599.0  4/6/2019 - Present Yes Hypertension ICD-10-CM: I10 
ICD-9-CM: 401.9  4/6/2019 - Present Yes Asthma (Chronic) ICD-10-CM: J45.909 ICD-9-CM: 493.90  5/19/2016 - Present Yes Overview Signed 4/7/2019  7:19 AM by GARRET Bui Last Assessment & Plan:  
Albuterol prn Borderline personality disorder ([facility] (Chronic) ICD-10-CM: F60.3 ICD-9-CM: 301.83  5/19/2016 - Present Yes Overview Signed 4/7/2019  7:19 AM by GARRET Bui Last Assessment & Plan:  
Continue Prozac, Seroquel, and trazodone A/P: 
 
-Sepsis secondary to ? UTI Blood and urine cultures NGTD ID evaluation appreciated. Findings likely secondary to rhabdo Abxs stopped 04/10 
 
-CIRA Likely secondary to rhabdo No recovery of renal function so far Nephrology follow up appreciated Next HD on Saturday -Rhabdo CPK trending down D/c IVFs as patient is having swelling of LE 
 
 
- Toxic encephalopathy Likely secondary to amphetamines Resolved 
 
-Rt peroneal vein dvt 
?due to trauma Family at bedside stated maybe she hit her RLE before coming to the ED Patient reported RLE having more swelling than LLE. To me and nuse looks about the same. Cont heparin drip Will repeat US venous on RLE 
 
-Elevated LFTs Likely due to rhabdo Trending down 
 viral hepatitis panel and HIV neg Cont to trend 
 
-R labia majora swelling Could be secondary to ? IVF and RLE DVT Stable GC and chlamydia pending -HTN Start amlodipine 10 mg and add hydralazine PRN 
 
 
DVT Prophylaxis: heparin CODE Status: Full Plan of Care Discussed with: patient. Care team. 
 
 
Thiago Patel MD 
04/11/19

## 2019-04-11 NOTE — PROGRESS NOTES
[facility] PROGRESS NOTE Follow up for: CIRA Subjective:  
Patient seen and examined on HD, 3rd treatment today dialyzing via right TCC, 300 Qb, UF 2600, she reports feeling tired, tolerating UF. TCC exchanged yesterday and functioning well. ROS: 
Gen - no fever, no chills, appetite okay CV - no chest pain, no orthopnea Lung - no shortness of breath, no cough Abd - no tenderness, no nausea, no vomiting Ext - trace edema Objective:  
Exam: 
Vitals:  
 04/10/19 2315 04/11/19 0310 04/11/19 0522 04/11/19 8578 BP: 145/90 (!) 150/95  (!) 144/94 Pulse: (!) 101 (!) 101  (!) 101 Resp: 19 18 Temp: 98.2 °F (36.8 °C) 97.5 °F (36.4 °C) SpO2: 99% 100% Weight:   72.6 kg (160 lb) Height:      
 
 
 
Intake/Output Summary (Last 24 hours) at 4/11/2019 0735 Last data filed at 4/11/2019 9913 Gross per 24 hour Intake 946 ml Output 1350 ml Net -404 ml Current Facility-Administered Medications Medication Dose Route Frequency  amLODIPine (NORVASC) tablet 5 mg  5 mg Oral DAILY  diphenhydrAMINE (BENADRYL) capsule 25 mg  25 mg Oral DIALYSIS PRN  
 HYDROcodone-acetaminophen (NORCO)  mg tablet 1 Tab  1 Tab Oral Q6H PRN  
 diphenhydrAMINE (BENADRYL) capsule 25 mg  25 mg Oral Q6H PRN  thiamine HCL (B-1) tablet 100 mg  100 mg Oral DAILY  folic acid (FOLVITE) tablet 1 mg  1 mg Oral DAILY  heparin 25,000 units in dextrose 500 mL infusion  18-36 Units/kg/hr IntraVENous TITRATE  sodium chloride (NS) flush 5-10 mL  5-10 mL IntraVENous PRN  
 ondansetron (ZOFRAN) injection 4 mg  4 mg IntraVENous Q6H PRN  
 0.9% sodium chloride infusion  75 mL/hr IntraVENous CONTINUOUS  
 nicotine (NICODERM CQ) 21 mg/24 hr patch 1 Patch  1 Patch TransDERmal DAILY  LORazepam (ATIVAN) injection 1 mg  1 mg IntraVENous Q6H PRN  
 morphine injection 2 mg  2 mg IntraVENous Q4H PRN  
 naloxone (NARCAN) injection 0.4 mg  0.4 mg IntraVENous EVERY 2 MINUTES AS NEEDED  
 
 
EXAM 
 GEN - Alert, oriented, in no distress CV - S1, S2, RRR, no rub, murmur, or gallop Lung - clear to auscultation bilaterally Abd - soft, nontender, BS present Ext - trace BLE edema Access - Right TCC intact- no no evidence of infection Recent Labs 04/11/19 
3583 04/10/19 
0448 04/09/19 
0550 WBC 10.1 8.7 12.3* HGB 10.3* 10.7* 11.2* HCT 30.5* 31.0* 34.3*  
 162 140* Recent Labs 04/11/19 
9592 04/10/19 
0448 04/09/19 
0550  137 134* K 3.7 3.5 4.1  106 108* CO2 21 21 12* BUN 42* 41* 53* CREA 7.03* 6.55* 6.97* CA 7.5* 7.3* 6.8*  
GLU 86 85 82 Assessment and Plan: This is a 34 yo WF with rhabdomyolysis likely related to amphetamine use. Now with CIRA likely ATN- no sign of renal recovery yet - s/p temp cath on 4/8 and first HD 4/9 
- next HD likely Saturday for volume and clearance Marion Tejada, NP I reviewed, examined Pt and agree with KENNETH Acuña NP's note

## 2019-04-11 NOTE — PROGRESS NOTES
Problem: Pain Goal: *Control of Pain Outcome: Progressing Towards Goal 
  
Problem: Patient Education: Go to Patient Education Activity Goal: Patient/Family Education Outcome: Progressing Towards Goal 
  
Problem: Tobacco Use 
Goal: *Tobacco use abstinence Outcome: Progressing Towards Goal 
Goal: *Knowledge of tobacco use cessation methods Outcome: Progressing Towards Goal 
  
Problem: Patient Education: Go to Patient Education Activity Goal: Patient/Family Education Outcome: Progressing Towards Goal 
  
Problem: Seizure Disorder (Adult) Goal: *STG: Remains free of seizure activity Outcome: Progressing Towards Goal 
Goal: *STG: Maintains lab values within therapeutic range Outcome: Progressing Towards Goal 
Goal: *STG/LTG: Complies with medication therapy Outcome: Progressing Towards Goal 
Goal: *STG: Remains free of injury during seizure activity Outcome: Progressing Towards Goal 
Goal: *STG: Remains safe in hospital 
Outcome: Progressing Towards Goal 
Goal: Interventions Outcome: Progressing Towards Goal 
  
Problem: Patient Education: Go to Patient Education Activity Goal: Patient/Family Education Outcome: Progressing Towards Goal 
  
Problem: Falls - Risk of 
Goal: *Absence of Falls Description Document Clotilde Mcgee Fall Risk and appropriate interventions in the flowsheet. Outcome: Progressing Towards Goal 
  
Problem: Patient Education: Go to Patient Education Activity Goal: Patient/Family Education Outcome: Progressing Towards Goal 
  
Problem: Acute Renal Failure: Day 1 Goal: Off Pathway (Use only if patient is Off Pathway) Outcome: Progressing Towards Goal 
Goal: Activity/Safety Outcome: Progressing Towards Goal 
Goal: Consults, if ordered Outcome: Progressing Towards Goal 
Goal: Diagnostic Test/Procedures Outcome: Progressing Towards Goal 
Goal: Nutrition/Diet Outcome: Progressing Towards Goal 
Goal: Discharge Planning Outcome: Progressing Towards Goal 
Goal: Medications Outcome: Progressing Towards Goal 
Goal: Respiratory Outcome: Progressing Towards Goal 
Goal: Treatments/Interventions/Procedures Outcome: Progressing Towards Goal 
Goal: Psychosocial 
Outcome: Progressing Towards Goal 
Goal: *Optimal pain control at patient's stated goal 
Outcome: Progressing Towards Goal 
Goal: *Urinary output within identified parameters Outcome: Progressing Towards Goal 
Goal: *Hemodynamically stable Outcome: Progressing Towards Goal 
Goal: *Tolerating diet Outcome: Progressing Towards Goal 
  
Problem: Acute Renal Failure: Day 2 Goal: Off Pathway (Use only if patient is Off Pathway) Outcome: Progressing Towards Goal 
Goal: Activity/Safety Outcome: Progressing Towards Goal 
Goal: Consults, if ordered Outcome: Progressing Towards Goal 
Goal: Diagnostic Test/Procedures Outcome: Progressing Towards Goal 
Goal: Nutrition/Diet Outcome: Progressing Towards Goal 
Goal: Discharge Planning Outcome: Progressing Towards Goal 
Goal: Medications Outcome: Progressing Towards Goal 
Goal: Respiratory Outcome: Progressing Towards Goal 
Goal: Treatments/Interventions/Procedures Outcome: Progressing Towards Goal 
Goal: Psychosocial 
Outcome: Progressing Towards Goal 
Goal: *Optimal pain control at patient's stated goal 
Outcome: Progressing Towards Goal 
Goal: *Urinary output within identified parameters Outcome: Progressing Towards Goal 
Goal: *Hemodynamically stable Outcome: Progressing Towards Goal 
Goal: *Tolerating diet Outcome: Progressing Towards Goal 
Goal: *Lab values improving Outcome: Progressing Towards Goal 
  
Problem: Acute Renal Failure: Day 3 Goal: Off Pathway (Use only if patient is Off Pathway) Outcome: Progressing Towards Goal 
Goal: Activity/Safety Outcome: Progressing Towards Goal 
Goal: Consults, if ordered Outcome: Progressing Towards Goal 
Goal: Diagnostic Test/Procedures Outcome: Progressing Towards Goal 
Goal: Nutrition/Diet Outcome: Progressing Towards Goal 
 Goal: Discharge Planning Outcome: Progressing Towards Goal 
Goal: Medications Outcome: Progressing Towards Goal 
Goal: Respiratory Outcome: Progressing Towards Goal 
Goal: Treatments/Interventions/Procedures Outcome: Progressing Towards Goal 
Goal: Psychosocial 
Outcome: Progressing Towards Goal 
Goal: *Optimal pain control at patient's stated goal 
Outcome: Progressing Towards Goal 
Goal: *Urinary output within identified parameters Outcome: Progressing Towards Goal 
Goal: *Hemodynamically stable Outcome: Progressing Towards Goal 
Goal: *Tolerating diet Outcome: Progressing Towards Goal 
Goal: *Lab values improving Outcome: Progressing Towards Goal 
  
Problem: Acute Renal Failure: Day 4 Goal: Off Pathway (Use only if patient is Off Pathway) Outcome: Progressing Towards Goal 
Goal: Activity/Safety Outcome: Progressing Towards Goal 
Goal: Consults, if ordered Outcome: Progressing Towards Goal 
Goal: Diagnostic Test/Procedures Outcome: Progressing Towards Goal 
Goal: Nutrition/Diet Outcome: Progressing Towards Goal 
Goal: Discharge Planning Outcome: Progressing Towards Goal 
Goal: Medications Outcome: Progressing Towards Goal 
Goal: Respiratory Outcome: Progressing Towards Goal 
Goal: Treatments/Interventions/Procedures Outcome: Progressing Towards Goal 
Goal: Psychosocial 
Outcome: Progressing Towards Goal 
Goal: *Optimal pain control at patient's stated goal 
Outcome: Progressing Towards Goal 
Goal: *Urinary output within identified parameters Outcome: Progressing Towards Goal 
Goal: *Hemodynamically stable Outcome: Progressing Towards Goal 
Goal: *Tolerating diet Outcome: Progressing Towards Goal 
Goal: *Lab values improving Outcome: Progressing Towards Goal 
  
Problem: Acute Renal Failure: Day 5 Goal: Off Pathway (Use only if patient is Off Pathway) Outcome: Progressing Towards Goal 
Goal: Activity/Safety Outcome: Progressing Towards Goal 
Goal: Diagnostic Test/Procedures Outcome: Progressing Towards Goal 
Goal: Nutrition/Diet Outcome: Progressing Towards Goal 
Goal: Discharge Planning Outcome: Progressing Towards Goal 
Goal: Medications Outcome: Progressing Towards Goal 
Goal: Respiratory Outcome: Progressing Towards Goal 
Goal: Treatments/Interventions/Procedures Outcome: Progressing Towards Goal 
Goal: Psychosocial 
Outcome: Progressing Towards Goal 
Goal: *Optimal pain control at patient's stated goal 
Outcome: Progressing Towards Goal 
Goal: *Urinary output within identified parameters Outcome: Progressing Towards Goal 
Goal: *Hemodynamically stable Outcome: Progressing Towards Goal 
Goal: *Tolerating diet Outcome: Progressing Towards Goal 
Goal: *Lab values improving Outcome: Progressing Towards Goal 
  
Problem: Non-Violent Restraints Goal: *Removal from restraints as soon as assessed to be safe Outcome: Progressing Towards Goal 
Goal: *No harm/injury to patient while restraints in use Outcome: Progressing Towards Goal 
Goal: *Patient's dignity will be maintained Outcome: Progressing Towards Goal 
Goal: *Patient Specific Goal (EDIT GOAL, INSERT TEXT) Outcome: Progressing Towards Goal 
Goal: Non-violent Restaints:Standard Interventions Outcome: Progressing Towards Goal 
Goal: Non-violent Restraints:Patient Interventions Outcome: Progressing Towards Goal 
Goal: Patient/Family Education Outcome: Progressing Towards Goal

## 2019-04-11 NOTE — PROGRESS NOTES
[facility] [address] [place] [address] Patient sleeping with family at bedside.

## 2019-04-11 NOTE — PROGRESS NOTES
END OF SHIFT NOTE: 
 
INTAKE/OUTPUT 
04/10 0701 - 04/11 0700 In: 946 [I.V.:946] Out: 1350 [Urine:350] Voiding: YES Catheter: YES Drain:   
 
 
 
 
 
Flatus: Patient does have flatus present. Stool:  1 occurrences. Characteristics: 
Stool Assessment Stool Color: Jillyn Nunnery Stool Appearance: Loose Stool Amount: Medium Stool Source/Status: Rectum Emesis: 0 occurrences. Characteristics: VITAL SIGNS Patient Vitals for the past 12 hrs: 
 Temp Pulse Resp BP SpO2  
04/11/19 0310 97.5 °F (36.4 °C) (!) 101 18 (!) 150/95 100 % 04/10/19 2315 98.2 °F (36.8 °C) (!) 101 19 145/90 99 % 04/10/19 1947 97.9 °F (36.6 °C) 100 17 136/90 99 % Pain Assessment Pain Intensity 1: 0 (04/11/19 0600) Pain Location 1: Groin, Leg, Neck Pain Intervention(s) 1: Medication (see MAR) Patient Stated Pain Goal: 0 Ambulating Yes Patient constantly requesting pain medication. Patient unable to hold eyes open and have a conversation. Family at bedside. Shift report given to oncoming nurse at the bedside. Haim Chavira

## 2019-04-12 LAB
ALBUMIN SERPL-MCNC: 1.6 G/DL (ref 3.5–5)
ALBUMIN/GLOB SERPL: 0.6 {RATIO} (ref 1.2–3.5)
ALP SERPL-CCNC: 30 U/L (ref 50–136)
ALT SERPL-CCNC: 164 U/L (ref 12–65)
ANION GAP SERPL CALC-SCNC: 8 MMOL/L (ref 7–16)
APTT PPP: 75.3 SEC (ref 24.7–39.8)
AST SERPL-CCNC: 173 U/L (ref 15–37)
BILIRUB SERPL-MCNC: 0.5 MG/DL (ref 0.2–1.1)
BUN SERPL-MCNC: 35 MG/DL (ref 6–23)
CALCIUM SERPL-MCNC: 7.8 MG/DL (ref 8.3–10.4)
CHLORIDE SERPL-SCNC: 102 MMOL/L (ref 98–107)
CK SERPL-CCNC: 3645 U/L (ref 21–215)
CO2 SERPL-SCNC: 26 MMOL/L (ref 21–32)
CREAT SERPL-MCNC: 6.09 MG/DL (ref 0.6–1)
GLOBULIN SER CALC-MCNC: 2.7 G/DL (ref 2.3–3.5)
GLUCOSE SERPL-MCNC: 90 MG/DL (ref 65–100)
HCT VFR BLD AUTO: 28.1 % (ref 35.8–46.3)
HGB BLD-MCNC: 9.7 G/DL (ref 11.7–15.4)
POTASSIUM SERPL-SCNC: 3.5 MMOL/L (ref 3.5–5.1)
PROT SERPL-MCNC: 4.3 G/DL (ref 6.3–8.2)
SODIUM SERPL-SCNC: 136 MMOL/L (ref 136–145)

## 2019-04-12 PROCEDURE — 97165 OT EVAL LOW COMPLEX 30 MIN: CPT

## 2019-04-12 PROCEDURE — 74011250637 HC RX REV CODE- 250/637: Performed by: INTERNAL MEDICINE

## 2019-04-12 PROCEDURE — 85730 THROMBOPLASTIN TIME PARTIAL: CPT

## 2019-04-12 PROCEDURE — 77030020263 HC SOL INJ SOD CL0.9% LFCR 1000ML

## 2019-04-12 PROCEDURE — 85018 HEMOGLOBIN: CPT

## 2019-04-12 PROCEDURE — 74011250636 HC RX REV CODE- 250/636: Performed by: INTERNAL MEDICINE

## 2019-04-12 PROCEDURE — 82550 ASSAY OF CK (CPK): CPT

## 2019-04-12 PROCEDURE — 65660000000 HC RM CCU STEPDOWN

## 2019-04-12 PROCEDURE — 97530 THERAPEUTIC ACTIVITIES: CPT

## 2019-04-12 PROCEDURE — 80053 COMPREHEN METABOLIC PANEL: CPT

## 2019-04-12 PROCEDURE — 74011250637 HC RX REV CODE- 250/637: Performed by: FAMILY MEDICINE

## 2019-04-12 PROCEDURE — 65270000029 HC RM PRIVATE

## 2019-04-12 PROCEDURE — 97162 PT EVAL MOD COMPLEX 30 MIN: CPT

## 2019-04-12 RX ORDER — WARFARIN SODIUM 5 MG/1
5 TABLET ORAL EVERY EVENING
Status: DISCONTINUED | OUTPATIENT
Start: 2019-04-12 | End: 2019-04-14

## 2019-04-12 RX ADMIN — FOLIC ACID 1 MG: 1 TABLET ORAL at 09:22

## 2019-04-12 RX ADMIN — HYDROCODONE BITARTRATE AND ACETAMINOPHEN 1 TABLET: 10; 325 TABLET ORAL at 09:21

## 2019-04-12 RX ADMIN — LORAZEPAM 1 MG: 2 INJECTION INTRAMUSCULAR; INTRAVENOUS at 07:20

## 2019-04-12 RX ADMIN — LORAZEPAM 1 MG: 2 INJECTION INTRAMUSCULAR; INTRAVENOUS at 16:15

## 2019-04-12 RX ADMIN — HYDROCODONE BITARTRATE AND ACETAMINOPHEN 1 TABLET: 10; 325 TABLET ORAL at 14:55

## 2019-04-12 RX ADMIN — DIPHENHYDRAMINE HYDROCHLORIDE 25 MG: 25 CAPSULE ORAL at 22:19

## 2019-04-12 RX ADMIN — MORPHINE SULFATE 2 MG: 2 INJECTION, SOLUTION INTRAMUSCULAR; INTRAVENOUS at 06:17

## 2019-04-12 RX ADMIN — AMLODIPINE BESYLATE 10 MG: 10 TABLET ORAL at 09:22

## 2019-04-12 RX ADMIN — MORPHINE SULFATE 2 MG: 2 INJECTION, SOLUTION INTRAMUSCULAR; INTRAVENOUS at 21:51

## 2019-04-12 RX ADMIN — HEPARIN SODIUM 20 UNITS/KG/HR: 5000 INJECTION, SOLUTION INTRAVENOUS at 10:00

## 2019-04-12 RX ADMIN — WARFARIN SODIUM 5 MG: 5 TABLET ORAL at 17:56

## 2019-04-12 RX ADMIN — Medication 100 MG: at 09:21

## 2019-04-12 RX ADMIN — MORPHINE SULFATE 2 MG: 2 INJECTION, SOLUTION INTRAMUSCULAR; INTRAVENOUS at 17:57

## 2019-04-12 RX ADMIN — Medication 10 ML: at 06:17

## 2019-04-12 RX ADMIN — MORPHINE SULFATE 2 MG: 2 INJECTION, SOLUTION INTRAMUSCULAR; INTRAVENOUS at 11:41

## 2019-04-12 NOTE — PROGRESS NOTES
Hospitalist Progress Note 2019 Admit Date: 2019  4:59 PM  
NAME: Nida Moreno :  1990 DOS:              19 MRN:  [id number] Attending: Lucille Dunn MD 
PCP:  None Treatment Team: Attending Provider: Janelle Guy MD; Consulting Provider: Luis Mead MD; Care Manager: Bernadine Magdaleno RN; Utilization Review: Beena Springer RN; Physical Therapist: Kamran Murillo PT; Occupational Therapist: Jenifer Closs, OT Full Code SUBJECTIVE: As previously documented: 32 yo female with PMH of polysubstance use who is evaluated with malaise and bilateral hip pain. She has UDS showing amphetamines, benzo, opiates. In the ED patient was found to have UA (+) for UTI, CIRA ,rhabdo and elevated LFTs. CT brain and CXR with no acute disease. Patient also was found to have a R peroneal vein thrombosis on US, started on heparin drip. Vascular evaluated patient for suspected stenosis of L external iliac artery with no acute intervention recommended. Nephrology following due to CIRA, no renal recovery so far requiring HD.  
 
 
 19    Nida Moreno stated is doing \"ok\" today but still having some swelling of LE.  
 
 
 
10+ ROS reviewed and negative except for positive in HPI. Allergies Allergen Reactions  Dilantin [Phenytoin Sodium Extended] Itching  Keppra [Levetiracetam] Rash Current Facility-Administered Medications Medication Dose Route Frequency  amLODIPine (NORVASC) tablet 10 mg  10 mg Oral DAILY  hydrALAZINE (APRESOLINE) 20 mg/mL injection 10 mg  10 mg IntraVENous Q6H PRN  
 diphenhydrAMINE (BENADRYL) capsule 25 mg  25 mg Oral DIALYSIS PRN  
 HYDROcodone-acetaminophen (NORCO)  mg tablet 1 Tab  1 Tab Oral Q6H PRN  
 diphenhydrAMINE (BENADRYL) capsule 25 mg  25 mg Oral Q6H PRN  thiamine HCL (B-1) tablet 100 mg  100 mg Oral DAILY  folic acid (FOLVITE) tablet 1 mg  1 mg Oral DAILY  heparin 25,000 units in dextrose 500 mL infusion  18-36 Units/kg/hr IntraVENous TITRATE  sodium chloride (NS) flush 5-10 mL  5-10 mL IntraVENous PRN  
 ondansetron (ZOFRAN) injection 4 mg  4 mg IntraVENous Q6H PRN  
 nicotine (NICODERM CQ) 21 mg/24 hr patch 1 Patch  1 Patch TransDERmal DAILY  LORazepam (ATIVAN) injection 1 mg  1 mg IntraVENous Q6H PRN  
 morphine injection 2 mg  2 mg IntraVENous Q4H PRN  
 naloxone (NARCAN) injection 0.4 mg  0.4 mg IntraVENous EVERY 2 MINUTES AS NEEDED There is no immunization history on file for this patient. Objective:  
 
Patient Vitals for the past 24 hrs: 
 Temp Pulse Resp BP SpO2  
19 1208  (!) 111   99 % 19 0744 97.5 °F (36.4 °C) (!) 135 18 (!) 147/97 99 % 19 0400 98.4 °F (36.9 °C) (!) 101 18 145/88 98 % 19 2317 98.3 °F (36.8 °C) (!) 108 18 137/90 99 % 19 1925 97.6 °F (36.4 °C) (!) 111 18 129/84 93 % 19 1518 98.4 °F (36.9 °C) (!) 112 16 (!) 128/93 99 % Temp (24hrs), Av °F (36.7 °C), Min:97.5 °F (36.4 °C), Max:98.4 °F (36.9 °C) Oxygen Therapy O2 Sat (%): 99 % (19 1208) Pulse via Oximetry: 127 beats per minute (19) O2 Device: Room air (19 1100) Oxygen Therapy O2 Sat (%): 99 % (19 1208) Pulse via Oximetry: 127 beats per minute (19) O2 Device: Room air (19 1100) Physical Exam: 
General:         Alert, cooperative, no distress HEENT:               NCAT. No obvious deformity. Lungs: No wheezing/rhonchi/rales Cardiovascular:   RRR. No m/r/g. Abdomen:       S/nt/nd. Bowel sounds normal. .  
Skin:         Diffused generalized pain Neurologic:    No gross focal deficit. Psychiatric:         Good mood. Normal affect. Extremities:         Some diffuse tenderness b/l LE. Trace pedal edema. (+) pedal pulses DIAGNOSTIC STUDIES Data Review:  
Recent Results (from the past 24 hour(s)) CK  
 Collection Time: 04/12/19  3:46 AM  
Result Value Ref Range CK 3,645 (H) 21 - 191 U/L  
METABOLIC PANEL, COMPREHENSIVE Collection Time: 04/12/19  3:46 AM  
Result Value Ref Range Sodium 136 136 - 145 mmol/L Potassium 3.5 3.5 - 5.1 mmol/L Chloride 102 98 - 107 mmol/L  
 CO2 26 21 - 32 mmol/L Anion gap 8 7 - 16 mmol/L Glucose 90 65 - 100 mg/dL BUN 35 (H) 6 - 23 MG/DL Creatinine 6.09 (H) 0.6 - 1.0 MG/DL  
 GFR est AA 10 (L) >60 ml/min/1.73m2 GFR est non-AA 9 (L) >60 ml/min/1.73m2 Calcium 7.8 (L) 8.3 - 10.4 MG/DL Bilirubin, total 0.5 0.2 - 1.1 MG/DL  
 ALT (SGPT) 164 (H) 12 - 65 U/L  
 AST (SGOT) 173 (H) 15 - 37 U/L Alk. phosphatase 30 (L) 50 - 136 U/L Protein, total 4.3 (L) 6.3 - 8.2 g/dL Albumin 1.6 (L) 3.5 - 5.0 g/dL Globulin 2.7 2.3 - 3.5 g/dL A-G Ratio 0.6 (L) 1.2 - 3.5 PTT Collection Time: 04/12/19  3:46 AM  
Result Value Ref Range aPTT 75.3 (H) 24.7 - 39.8 SEC  
HGB & HCT Collection Time: 04/12/19  3:46 AM  
Result Value Ref Range HGB 9.7 (L) 11.7 - 15.4 g/dL HCT 28.1 (L) 35.8 - 46.3 % All Micro Results Procedure Component Value Units Date/Time CULTURE, BLOOD [[id number]] Collected:  04/07/19 2130 Order Status:  Completed Specimen:  Blood Updated:  04/12/19 0915 Special Requests: --     
  LEFT 
ARM Culture result: NO GROWTH 4 DAYS     
 CULTURE, BLOOD [[id number]] Collected:  04/06/19 2032 Order Status:  Completed Specimen:  Blood Updated:  04/11/19 6048 Special Requests: --     
  LEFT 
HAND Culture result: NO GROWTH 5 DAYS     
 CULTURE, URINE [[id number]] Collected:  04/06/19 1857 Order Status:  Completed Specimen:  Cath Urine Updated:  04/09/19 8809 Special Requests: NO SPECIAL REQUESTS Culture result: NO GROWTH 2 DAYS     
 CULTURE, URINE [[id number]] Collected:  04/06/19 2145 Order Status:  Canceled Specimen:  Urine from Clean catch CULTURE, BLOOD [[id number]] Collected:  19 Order Status:  Canceled Specimen:  Blood Imaging Kirsten Coffee /Studies: CXR Results  (Last 48 hours) None CT Results  (Last 48 hours) None Duplex Lower Ext Venous Right Result Date: 2019 IMPRESSION: Possible partial thrombosis of the right peroneal vein in the upper calf. No thrombus is seen about the knee. Results for orders placed or performed during the hospital encounter of 19  
2D ECHO COMPLETE ADULT (TTE) W OR WO CONTR Narrative [place] [facility] [address] [place], [address] [facility] 
[phone] Transthoracic Echocardiogram 
2D, M-mode, Doppler, and Color Doppler Patient: Lanette Dakins MR #: [id number] : 1990 Age: 33 years Gender: Female Study date: 2019 Account #: [de-identified] Height: 59 in 
Weight: 124.7 lb 
BSA: 1.51 mï¾² Status:Routine Location: 205 BP: 120/ 78 Allergies: PHENYTOIN SODIUM EXTENDED, LEVETIRACETAM 
 
Sonographer:  Bright Dhillon, [facility] Group:  [facility] Referring Physician:  Jarrod Fair. Werner Bosworth, MD 
Reading Physician:  Tyrone Tate. Suleman Real MD [facility] INDICATIONS: PVD. *Per patient- unable to turn onto left side due to blood clot in leg. * PROCEDURE: This was a routine study. A transthoracic echocardiogram was 
performed. The study included complete 2D imaging, M-mode, complete spectral 
Doppler, and color Doppler. Echocardiographic views were limited by  
restricted 
patient mobility and poor acoustic window availability. Image quality was 
adequate. LEFT VENTRICLE: Size was normal. Systolic function was normal. Ejection 
fraction was estimated in the range of 55 % to 60 %. There were no regional 
wall motion abnormalities. Wall thickness was normal. The E/e' ratio was 6.2.  
Left ventricular diastolic function parameters were normal. 
 
 RIGHT VENTRICLE: The size was normal. Systolic function was normal. The 
tricuspid jet envelope definition was inadequate for estimation of RV  
systolic 
pressure. LEFT ATRIUM: Size was normal. 
 
RIGHT ATRIUM: Size was normal. 
 
SYSTEMIC VEINS: IVC: The inferior vena cava was normal in size and course. AORTIC VALVE: The valve was structurally normal, tri-commissural. There was  
no 
evidence for stenosis. There was no insufficiency. MITRAL VALVE: Valve structure was normal. There was no evidence for stenosis. There was trivial regurgitation. TRICUSPID VALVE: The valve structure was normal. There was no evidence for 
stenosis. There was no regurgitation. PULMONIC VALVE: Not well visualized. There was no evidence for stenosis. There 
was trivial regurgitation. PERICARDIUM: There was no pericardial effusion. AORTA: The root exhibited normal size. SUMMARY: 
 
-  Left ventricle: Systolic function was normal. Ejection fraction was 
estimated in the range of 55 % to 60 %. There were no regional wall motion 
abnormalities. SYSTEM MEASUREMENT TABLES 
 
2D mode AoR Diam (2D): 2.5 cm 
LA Dimension (2D): 3 cm Left Atrium Systolic Volume Index; Method of Disks, Biplane; 2D mode;: 15.2 
ml/m2 IVS/LVPW (2D): 1.1 IVSd (2D): 1 cm LVIDd (2D): 3.8 cm LVIDs (2D): 2.5 cm 
LVOT Area (2D): 3.1 cm2 LVPWd (2D): 0.9 cm Tissue Doppler Imaging LV Peak Early Charles Tissue Win; Lateral MA (TDI): 17.2 cm/s LV Peak Early Charles Tissue Win; Medial MA (TDI): 8.2 cm/s Unspecified Scan Mode Peak Grad; Mean; Antegrade Flow: 8 mm[Hg] Vmax; Antegrade Flow: 134 cm/s LVOT Diam: 2 cm 
MV Peak Win/LV Peak Tissue Win E-Wave; Lateral MA: 4 MV Peak Win/LV Peak Tissue Win E-Wave; Medial MA: 8.4 Prepared and signed by 
 
Alexis Kirby. Merlyn Eisenberg MD [facility] Signed 08-Apr-2019 12:22:39 Labs and Studies from previous 24 hours have been personally reviewed by myself   
ASSESSMENT Active Hospital Problems Diagnosis Date Noted  Encephalopathy, toxic 04/08/2019  Toxic metabolic encephalopathy 35/17/0278  Rhabdomyolysis 04/07/2019  Sepsis ([facility] 04/06/2019  Hepatitis 04/06/2019  Substance use disorder 04/06/2019  Hip pain 04/06/2019  Hyponatremia 04/06/2019  CIRA (acute kidney injury) ([facility] 04/06/2019  UTI (urinary tract infection) 04/06/2019  Hypertension 04/06/2019  Asthma 05/19/2016 Last Assessment & Plan:  
Albuterol prn  Borderline personality disorder ([facility] 05/19/2016 Last Assessment & Plan:  
Continue Prozac, Seroquel, and trazodone Hospital Problems as of 4/12/2019 Date Reviewed: 4/7/2019 Codes Class Noted - Resolved POA Encephalopathy, toxic ICD-10-CM: G92 ICD-9-CM: 349.82  4/8/2019 - Present Unknown Toxic metabolic encephalopathy [id number]-39-HA: T16 ICD-9-CM: 349.82  4/8/2019 - Present Unknown Rhabdomyolysis ICD-10-CM: X38.70 ICD-9-CM: 728.88  4/7/2019 - Present Yes * (Principal) Sepsis ([facility] ICD-10-CM: A41.9 ICD-9-CM: 038.9, 995.91  4/6/2019 - Present Yes Hepatitis ICD-10-CM: K75.9 ICD-9-CM: 573.3  4/6/2019 - Present Yes Substance use disorder (Chronic) ICD-10-CM: F19.90 ICD-9-CM: 305.90  4/6/2019 - Present Yes Hip pain ICD-10-CM: M25.559 ICD-9-CM: 719.45  4/6/2019 - Present Yes Hyponatremia ICD-10-CM: E87.1 ICD-9-CM: 276.1  4/6/2019 - Present Yes CIRA (acute kidney injury) ([facility] ICD-10-CM: N17.9 ICD-9-CM: 584.9  4/6/2019 - Present Yes  
   
 UTI (urinary tract infection) ICD-10-CM: N39.0 ICD-9-CM: 599.0  4/6/2019 - Present Yes Hypertension ICD-10-CM: I10 
ICD-9-CM: 401.9  4/6/2019 - Present Yes Asthma (Chronic) ICD-10-CM: J45.909 ICD-9-CM: 493.90  5/19/2016 - Present Yes Overview Signed 4/7/2019  7:19 AM by GARRET David Last Assessment & Plan:  
Albuterol prn Borderline personality disorder ([facility] (Chronic) ICD-10-CM: F60.3 ICD-9-CM: 301.83  5/19/2016 - Present Yes Overview Signed 4/7/2019  7:19 AM by GARRET Pond Last Assessment & Plan:  
Continue Prozac, Seroquel, and trazodone A/P: 
 
 
-CIRA Likely secondary to rhabdo No signs of renal recovery yet Next HD on Saturday for volume and clearance PT/OT to evaluate today. Out of bed to chair 
 
-Sepsis secondary to ? UTI Blood and urine cultures NGTD ID evaluation appreciated. Findings likely secondary to rhabdo Abxs stopped 04/10 
 
-Rhabdo CPK trending down Cont to trend - Toxic encephalopathy Likely secondary to amphetamines Resolved 
 
-Rt peroneal vein dvt 
?due to trauma Repeated US with partial thrombosis R peroneal vein Cont heparin drip Start bridging with coumadin, not candidate for NOAC due to renal function. Patient having some edema on LE b/l. Likely will improve with HD 
 
-Elevated LFTs Likely due to rhabdo Trending down 
 viral hepatitis panel and HIV neg Cont to trend 
 
-R labia majora swelling Stable GC and chlamydia neg -HTN Better controlled Cont  amlodipine 10 mg and  hydralazine PRN 
 
 
DVT Prophylaxis: heparin CODE Status: Full Plan of Care Discussed with: patient. Care team. 
 
 
Víctor Prince MD 
04/12/19

## 2019-04-12 NOTE — PROGRESS NOTES
Problem: Mobility Impaired (Adult and Pediatric) Goal: *Acute Goals and Plan of Care Short Term Goals: 
(1.)Ms. Hernandez will move from supine to sit and sit to supine with STAND BY ASSIST within 3 treatment day(s). (2.)Ms. Hernandez will transfer from bed to chair and chair to bed with STAND BY ASSIST using the least restrictive device within 3 treatment day(s). (3.)Ms. Hernandez will ambulate with CONTACT GUARD ASSIST for 100 feet with the least restrictive device within 3 treatment day(s). Long Term Goals: 
(1.)Ms. Hernandez will move from supine to sit and sit to supine with INDEPENDENCE within 7 treatment day(s). (2.)Ms. Hernandez will transfer from bed to chair and chair to bed with INDEPENDENCE using the least restrictive device within 7 treatment day(s). (3.)Ms. Hernandez will ambulate with INDEPENDENCE for 300 feet with the least restrictive device within 7 treatment day(s). (4.)Ms. Hernandez will ascend/descend 2 steps with bilateral railings with INDEPENDENCE to allow access to home environment within 7 treatment day(s) PHYSICAL THERAPY: Initial Assessment and AM 4/12/2019 INPATIENT: PT Visit Days : 1 Payor: MEDICAID PENDING / Plan: Jatinder Jimenez PENDING / Product Type: Medicaid /   
  
NAME/AGE/GENDER: Italo Doherty is a 33 y.o. female PRIMARY DIAGNOSIS: Sepsis ([facility] [A41.9] Sepsis ([facility] Sepsis ([facility] ICD-10: Treatment Diagnosis:  
 · Generalized Muscle Weakness (M62.81) · Difficulty in walking, Not elsewhere classified (R26.2) Precaution/Allergies: 
Dilantin [phenytoin sodium extended] and Keppra [levetiracetam] ASSESSMENT:  
Ms. Levi Deal is a 32 yo F who was admitted to hospital on 4/6/19 with severe pain. Has significant social and past medical history factors. Prior to admission pt living with her family, independent with all mobility and not using any DME. Upon PT assessment pt presents supine in bed, just received pain medication from RN, agreeable to PT.  Pt able to perform rolling CGA, supine > sit EOB with Min A, sit <> stand from EOB using RW with CGA, and take small steps at side of bed CGA/RW. Pt presents with generally decreased BLE strength (more difficulty and pain moving RLE > LLE), impaired bed mobility, transfers, gait, and standing balance, and overall decreased activity tolerance which is affecting her ability to function at her baseline. Pt will benefit from skilled therapy services during her acute stay to address stated deficits to allow return to prior level of function for safe discharge. In addition to assessment PT provided gait training x15 ft. In pt's room using RW, CGA. Cues for upright posture and safe sequencing of AD and control of static and dynamic standing balance. Sit <> stand several trials from EOB and bedside chair, cuing for placement of BUE and control of descent to increase safety. PT instructed pt in BLE seated strengthening exercises, cuing for technique. This section established at most recent assessment PROBLEM LIST (Impairments causing functional limitations): 1. Decreased Strength 2. Decreased Transfer Abilities 3. Decreased Ambulation Ability/Technique 4. Decreased Balance 5. Increased Pain 6. Decreased Activity Tolerance 7. Decreased Pacing Skills 8. Increased Fatigue 9. Decreased [place] with Home Exercise Program 
 INTERVENTIONS PLANNED: (Benefits and precautions of physical therapy have been discussed with the patient.) 1. Balance Exercise 2. Bed Mobility 3. Family Education 4. Gait Training 5. Home Exercise Program (HEP) 6. Neuromuscular Re-education/Strengthening 7. Therapeutic Activites 8. Therapeutic Exercise/Strengthening 9. Transfer Training TREATMENT PLAN: Frequency/Duration: 3 times a week for duration of hospital stay Rehabilitation Potential For Stated Goals: Good RECOMMENDED REHABILITATION/EQUIPMENT: (at time of discharge pending progress): Due to the probability of continued deficits (see above) this patient will likely need continued skilled physical therapy after discharge. Equipment:  
? None at this time HISTORY:  
History of Present Injury/Illness (Reason for Referral): 
 
Per chart: Ms. Jyoti Maria is a 33 y.o. Female admitted with complaints of malaise and bilateral hip pain. PMH significant for polysubstance abuse, chronic back pain, seizure, bipolar disorder, and asthma. ER labs significant for amphetamines, benzodiazepines, and opiates on toxicology. Past Medical History/Comorbidities: Ms. Jyoti Maria  has a past medical history of Asthma, Chronic back pain, Chronic lower back pain, Psychiatric disorder, and Seizures ([facility] Ms. Jyoti Maria  has a past surgical history that includes hx gyn; ir insert non tunl cvc over 5 yrs (4/8/2019); and ir insert non tunl cvc over 5 yrs (4/10/2019). Social History/Living Environment:  
Home Environment: Private residence # Steps to Enter: 2 Rails to Enter: Yes One/Two Story Residence: One story Living Alone: No 
Support Systems: Family member(s), Friends \ neighbors Patient Expects to be Discharged to[de-identified] Private residence Current DME Used/Available at Home: None Prior Level of Function/Work/Activity: 
Independent with all mobility, has 2 children and a boyfriend. Number of Personal Factors/Comorbidities that affect the Plan of Care: 3+: HIGH COMPLEXITY EXAMINATION:  
Most Recent Physical Functioning:  
Gross Assessment: 
AROM: Within functional limits Strength: Generally decreased, functional(unable to formally assess RLE d/t pain to touch) Sensation: Impaired(hypersensitivity below knees BLE to light touch) Posture: 
  
Balance: 
Sitting: Impaired Sitting - Static: Fair (occasional) Sitting - Dynamic: Fair (occasional) Standing: Impaired Standing - Static: Fair Standing - Dynamic : Fair Bed Mobility: 
Rolling: Stand-by assistance Supine to Sit: Minimum assistance Sit to Supine: Minimum assistance Wheelchair Mobility: 
  
Transfers: Sit to Stand: Contact guard assistance Stand to Sit: Contact guard assistance Gait: 
  
Speed/Veroncia: Slow;Shuffled Step Length: Left shortened;Right shortened Gait Abnormalities: Antalgic; Shuffling gait; Decreased step clearance Distance (ft): 10 Feet (ft) Assistive Device: Walker, rolling Ambulation - Level of Assistance: Contact guard assistance Body Structures Involved: 1. Nerves 2. Heart 3. Metabolic 4. Bones 5. Joints 6. Muscles Body Functions Affected: 1. Hematological 
2. Neuromusculoskeletal 
3. Movement Related 4. Metobolic/Endocrine Activities and Participation Affected: 1. General Tasks and Demands 2. Mobility 3. Interpersonal Interactions and Relationships 4. Community, Social and [place] Number of elements that affect the Plan of Care: 4+: HIGH COMPLEXITY CLINICAL PRESENTATION:  
Presentation: Evolving clinical presentation with changing clinical characteristics: MODERATE COMPLEXITY CLINICAL DECISION MAKIN[address] [place] [id number] AM-[facility] 6 Clicks Basic Mobility Inpatient Short Form How much difficulty does the patient currently have. .. Unable A Lot A Little None 1. Turning over in bed (including adjusting bedclothes, sheets and blankets)? ? 1   ? 2   ? 3   ? 4  
2. Sitting down on and standing up from a chair with arms ( e.g., wheelchair, bedside commode, etc.)   ? 1   ? 2   ? 3   ? 4  
3. Moving from lying on back to sitting on the side of the bed?   ? 1   ? 2   ? 3   ? 4 How much help from another person does the patient currently need. .. Total A Lot A Little None 4. Moving to and from a bed to a chair (including a wheelchair)? ? 1   ? 2   ? 3   ? 4  
5. Need to walk in hospital room? ? 1   ? 2   ? 3   ? 4  
6. Climbing 3-5 steps with a railing? ? 1   ? 2   ? 3   ? 4  
© 2007, Trustees of [address] [place] [id number], under license to [employer]. All rights reserved Score:  Initial: 18 Most Recent: X (Date: -- ) Interpretation of Tool:  Represents activities that are increasingly more difficult (i.e. Bed mobility, Transfers, Gait). Medical Necessity:    
· Patient demonstrates fair ·  rehab potential due to higher previous functional level. · Skilled intervention continues to be required due to address functional BLE strength deficits, as well as gait, standing balance, and activity tolerance impairments. Reason for Services/Other Comments: 
· Patient continues to require skilled intervention due to medical complications · .and decreased activity tolerance, balance, gait, BLE functional strength. Use of outcome tool(s) and clinical judgement create a POC that gives a: Questionable prediction of patient's progress: MODERATE COMPLEXITY  
  
 
 
 
TREATMENT:  
(In addition to Assessment/Re-Assessment sessions the following treatments were rendered) Pre-treatment Symptoms/Complaints:  \"I am hurting all over, but I can try. \" 
Pain: Initial:  
Pain Intensity 1: 8 Pain Location 1: Generalized  Post Session:  unchanged Therapeutic Activity: (    8 min): Therapeutic activities including Bed transfers, Chair transfers, Ambulation on level ground and standing balance control, as well as BLE seated strengthening exercises to improve mobility, strength, balance and coordination. Required minimal   verbal cues to promote static and dynamic balance in standing and promote coordination of bilateral, lower extremity(s). Date: 
4/12/19 Date: 
 Date: Activity/Exercise Parameters Parameters Parameters Seated LAQ x15 BLE Seated Marches x15 BLE Seated Ankle Pumps x15 BLE Braces/Orthotics/Lines/Etc:  
· IV 
· boyle catheter · O2 Device: Room air Treatment/Session Assessment:   
· Response to Treatment:  Pt able to participate in all mobility with increased time; no change in pain levels · Interdisciplinary Collaboration:  
o Physical Therapist 
o Registered Nurse · After treatment position/precautions:  
o Up in chair 
o Bed/Chair-wheels locked 
o Bed in low position 
o Call light within reach 
o RN notified 
o Visitors at bedside · Compliance with Program/Exercises: Will assess as treatment progresses · Recommendations/Intent for next treatment session: \"Next visit will focus on advancements to more challenging activities and reduction in assistance provided\". Total Treatment Duration: PT Patient Time In/Time Out Time In: 8205 Time Out: 4557 Dustin Garcia, PT, DPT

## 2019-04-12 NOTE — PROGRESS NOTES
[facility] PROGRESS NOTE Follow up for: CIRA Subjective:  
 
Gen - no fever, no chills, appetite okay CV - no chest pain, no orthopnea Lung - no shortness of breath, no cough Abd - no tenderness, no nausea, no vomiting Ext - trace edema Objective:  
Exam: 
Vitals:  
 04/11/19 2317 04/12/19 0400 04/12/19 0706 04/12/19 4474 BP: 137/90 145/88  (!) 147/97 Pulse: (!) 108 (!) 101  (!) 135 Resp: 18 18 18 Temp: 98.3 °F (36.8 °C) 98.4 °F (36.9 °C)  97.5 °F (36.4 °C) SpO2: 99% 98%  99% Weight:   73.5 kg (162 lb) Height:      
 
 
 
Intake/Output Summary (Last 24 hours) at 4/12/2019 1029 Last data filed at 4/12/2019 2768 Gross per 24 hour Intake 637 ml Output 350 ml Net 287 ml  
 
 
Current Facility-Administered Medications Medication Dose Route Frequency  amLODIPine (NORVASC) tablet 10 mg  10 mg Oral DAILY  hydrALAZINE (APRESOLINE) 20 mg/mL injection 10 mg  10 mg IntraVENous Q6H PRN  
 diphenhydrAMINE (BENADRYL) capsule 25 mg  25 mg Oral DIALYSIS PRN  
 HYDROcodone-acetaminophen (NORCO)  mg tablet 1 Tab  1 Tab Oral Q6H PRN  
 diphenhydrAMINE (BENADRYL) capsule 25 mg  25 mg Oral Q6H PRN  thiamine HCL (B-1) tablet 100 mg  100 mg Oral DAILY  folic acid (FOLVITE) tablet 1 mg  1 mg Oral DAILY  heparin 25,000 units in dextrose 500 mL infusion  18-36 Units/kg/hr IntraVENous TITRATE  sodium chloride (NS) flush 5-10 mL  5-10 mL IntraVENous PRN  
 ondansetron (ZOFRAN) injection 4 mg  4 mg IntraVENous Q6H PRN  
 nicotine (NICODERM CQ) 21 mg/24 hr patch 1 Patch  1 Patch TransDERmal DAILY  LORazepam (ATIVAN) injection 1 mg  1 mg IntraVENous Q6H PRN  
 morphine injection 2 mg  2 mg IntraVENous Q4H PRN  
 naloxone (NARCAN) injection 0.4 mg  0.4 mg IntraVENous EVERY 2 MINUTES AS NEEDED  
 
 
EXAM 
GEN - Alert, oriented, in no distress CV - S1, S2, RRR, no rub, murmur, or gallop Lung - clear to auscultation bilaterally Abd - soft, nontender, BS present Ext - trace BLE edema Access - Right TCC intact- no no evidence of infection Recent Labs 04/12/19 
4049 04/11/19 
5254 04/10/19 
0448 WBC  --  10.1 8.7 HGB 9.7* 10.3* 10.7* HCT 28.1* 30.5* 31.0*  
PLT  --  165 162 Recent Labs 04/12/19 
5333 04/11/19 
2579 04/10/19 
0448  136 137  
K 3.5 3.7 3.5  105 106 CO2 26 21 21 BUN 35* 42* 41* CREA 6.09* 7.03* 6.55* CA 7.8* 7.5* 7.3*  
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 ATN- no sign of renal recovery yet 
-first HD 4/9 
-next HD likely Saturday for volume and clearance Yevgeniy Millan MD

## 2019-04-12 NOTE — PROGRESS NOTES
Problem: Patient Education: Go to Patient Education Activity Goal: Patient/Family Education Description 1. Patient will complete total body bathing and dressing with MOD I and adaptive equipment as needed. 2. Patient will complete toileting with MOD I.  
3. Patient will tolerate 23 minutes of OT treatment with 2-3 rest breaks to increase activity tolerance for ADLs. 4. Patient will complete functional transfers with MOD I and adaptive equipment as needed. 5. Patient will complete functional mobility for household distances with MOD I and adaptive equipment as needed. 6. Patient will complete self-grooming while standing edge of sink with MOD I. Timeframe: 7 visits Outcome: Progressing Towards Goal 
 
 
OCCUPATIONAL THERAPY: Initial Assessment and PM 4/12/2019 INPATIENT:   
Payor: MEDICAID PENDING / Plan: Irene Lapping PENDING / Product Type: Medicaid /  
  
NAME/AGE/GENDER: Sheryl Rodriguez is a 33 y.o. female PRIMARY DIAGNOSIS:  Sepsis ([facility] [address] [A41.9] Sepsis ([facility] [address] Sepsis ([facility] [address] ICD-10: Treatment Diagnosis:  
 Generalized Muscle Weakness (M62.81) Precautions/Allergies: 
  Fall precautions  Dilantin [phenytoin sodium extended] and Keppra [levetiracetam] ASSESSMENT:  
Ms. Leanne Powell presents for sepsis. Upon arrival, pt sitting upright in bedside chair with significant other at bedside. Pt is alert, oriented x 4, and agreeable to OT evaluation. Pt endorses 5/10 generalized pain this session. Pt reports living with roommate in a 2nd floor apartment. At baseline, pt reports independence with ADLs and functional mobility with no use of any DME. Pt reports now she is unable to dress or bathe herself since this hospitalization. Today, pt required CGA - min A for all functional transfers. Pt's BUE AROM and strength (4/5) is generally decreased but WFL. Ms. Leanne Powell presents with decreased strength, activity tolerance, and functional mobility today.  At this time, pt is functioning below baseline for ADLs and functional mobility. Pt would benefit from skilled OT services to address OT goals and plan of care. This section established at most recent assessment PROBLEM LIST (Impairments causing functional limitations): 
Decreased Strength Decreased ADL/Functional Activities Decreased Transfer Abilities Decreased Ambulation Ability/Technique Decreased Balance Decreased Activity Tolerance Increased Fatigue INTERVENTIONS PLANNED: (Benefits and precautions of occupational therapy have been discussed with the patient.) Activities of daily living training Adaptive equipment training Balance training Clothing management Community reintergration Donning&doffing training Neuromuscular re-eduation Re-evaluation Therapeutic activity Therapeutic exercise TREATMENT PLAN: Frequency/Duration: Follow patient 3x/week to address above goals. Rehabilitation Potential For Stated Goals: Good RECOMMENDED REHABILITATION/EQUIPMENT: (at time of discharge pending progress): Due to the probability of continued deficits (see above) this patient will likely need continued skilled occupational therapy after discharge. Equipment: TBD OCCUPATIONAL PROFILE AND HISTORY:  
History of Present Injury/Illness (Reason for Referral): 
See H&P Past Medical History/Comorbidities: Ms. Thang Rae  has a past medical history of Asthma, Chronic back pain, Chronic lower back pain, Psychiatric disorder, and Seizures ([facility] [address] Ms. Thang Rae  has a past surgical history that includes hx gyn; ir insert non tunl cvc over 5 yrs (4/8/2019); and ir insert non tunl cvc over 5 yrs (4/10/2019). Social History/Living Environment:  
Home Environment: Private residence # Steps to Enter: 2 Rails to Enter: Yes One/Two Story Residence: One story Living Alone: No 
Support Systems: Family member(s) Patient Expects to be Discharged to[de-identified] Private residence Current DME Used/Available at Home: None Prior Level of Function/Work/Activity: 
Independent with ADLs and functional mobility. Personal Factors:   
      Sex:  female Age:  33 y.o. Other factors that influence how disability is experienced by the patient:  multiple co-morbidities Number of Personal Factors/Comorbidities that affect the Plan of Care: Brief history (0):  LOW COMPLEXITY ASSESSMENT OF OCCUPATIONAL PERFORMANCE[de-identified]  
Activities of Daily Living:  
Basic ADLs (From Assessment) Complex ADLs (From Assessment) Feeding: Independent Oral Facial Hygiene/Grooming: Independent Bathing: Moderate assistance Upper Body Dressing: Independent Lower Body Dressing: Minimum assistance Toileting: Minimum assistance Instrumental ADL Meal Preparation: Maximum assistance Homemaking: Maximum assistance Medication Management: Maximum assistance Financial Management: Maximum assistance Grooming/Bathing/Dressing Activities of Daily Living Cognitive Retraining Safety/Judgement: Awareness of environment Bed/Mat Mobility Rolling: Stand-by assistance Supine to Sit: Minimum assistance Sit to Supine: Minimum assistance Sit to Stand: Contact guard assistance Stand to Sit: Contact guard assistance Most Recent Physical Functioning:  
Gross Assessment: 
AROM: Generally decreased, functional 
Strength: Generally decreased, functional 
         
  
Posture: 
  
Balance: 
Sitting: Impaired Sitting - Static: Good (unsupported) Sitting - Dynamic: Fair (occasional) Standing: Impaired Standing - Static: Fair Standing - Dynamic : Fair Bed Mobility: 
Rolling: Stand-by assistance Supine to Sit: Minimum assistance Sit to Supine: Minimum assistance Wheelchair Mobility: 
  
Transfers: 
Sit to Stand: Contact guard assistance Stand to Sit: Contact guard assistance Patient Vitals for the past 6 hrs: 
 BP SpO2 Pulse 04/12/19 1208  99 % (!) 111  
04/12/19 1231 130/87 99 % (!) 110 Mental Status Neurologic State: Alert Orientation Level: Oriented X4 Cognition: Appropriate decision making Perception: Appears intact Perseveration: No perseveration noted Safety/Judgement: Awareness of environment Physical Skills Involved: 
Balance Strength Activity Tolerance Pain (acute) Cognitive Skills Affected (resulting in the inability to perform in a timely and safe manner): 
none  Psychosocial Skills Affected: 
Habits/Routines Number of elements that affect the Plan of Care: 3-5:  MODERATE COMPLEXITY CLINICAL DECISION MAKING:  
[facility] AM-PAC? ?6 Clicks? Daily Activity Inpatient Short Form How much help from another person does the patient currently need. .. Total A Lot A Little None 1. Putting on and taking off regular lower body clothing? ? 1   ? 2   ? 3   ? 4  
2. Bathing (including washing, rinsing, drying)? ? 1   ? 2   ? 3   ? 4  
3. Toileting, which includes using toilet, bedpan or urinal?   ? 1   ? 2   ? 3   ? 4  
4. Putting on and taking off regular upper body clothing? ? 1   ? 2   ? 3   ? 4  
5. Taking care of personal grooming such as brushing teeth? ? 1   ? 2   ? 3   ? 4  
6. Eating meals? ? 1   ? 2   ? 3   ? 4  
© 2007, Trustees of [facility], under license to [employer]. All rights reserved Score:  Initial: 20 Most Recent: X (Date: -- ) Interpretation of Tool:  Represents activities that are increasingly more difficult (i.e. Bed mobility, Transfers, Gait). Medical Necessity:    
Patient is expected to demonstrate progress in strength, balance, coordination and functional technique 
 to decrease assistance required with ADLs and functional mobility. . 
Reason for Services/Other Comments: 
Patient continues to require skilled intervention due to medical complications and patient unable to attend/participate in therapy as expected Nico Duarte Use of outcome tool(s) and clinical judgement create a POC that gives a: LOW COMPLEXITY  
 
 
 
TREATMENT:  
(In addition to Assessment/Re-Assessment sessions the following treatments were rendered) Pre-treatment Symptoms/Complaints:   
Pain: Initial:  
Pain Intensity 1: 5 Pain Location 1: Generalized Pain Intervention(s) 1: Emotional support /10 Post Session:  same Assessment/Reassessment only, no treatment provided today Braces/Orthotics/Lines/Etc:  
IV 
O2 Device: Room air Treatment/Session Assessment:   
Response to Treatment:  eval only. Interdisciplinary Collaboration: Occupational Therapist 
Registered Nurse After treatment position/precautions:  
Up in chair Bed/Chair-wheels locked Call light within reach Family at bedside Compliance with Program/Exercises: Will assess as treatment progresses. Recommendations/Intent for next treatment session: \"Next visit will focus on advancements to more challenging activities and reduction in assistance provided\". Total Treatment Duration: OT Patient Time In/Time Out Time In: 9587 Time Out: 1900 Rayshawn Brower OT

## 2019-04-12 NOTE — PROGRESS NOTES
END OF SHIFT NOTE: 
 
INTAKE/OUTPUT 
04/11 0701 - 04/12 0700 In: -  
Out: 2350 [Urine:350] Voiding: NO 
Catheter: YES Drain:   
 
 
 
 
 
Flatus: Patient does have flatus present. Stool:  0 occurrences. Characteristics: 
Stool Assessment Stool Color: Jeannette Townsend Stool Appearance: Loose Stool Amount: Medium Stool Source/Status: Rectum Emesis: 0 occurrences. Characteristics: VITAL SIGNS Patient Vitals for the past 12 hrs: 
 Temp Pulse Resp BP SpO2  
04/12/19 1525 97.9 °F (36.6 °C) (!) 103 18 128/83 97 % 04/12/19 1231 97.7 °F (36.5 °C) (!) 110 18 130/87 99 % 04/12/19 1208  (!) 111   99 % 04/12/19 0744 97.5 °F (36.4 °C) (!) 135 18 (!) 147/97 99 % Pain Assessment Pain Intensity 1: 8 (04/12/19 1759) Pain Location 1: Leg 
Pain Intervention(s) 1: Medication (see MAR) Patient Stated Pain Goal: 2 Ambulating Yes, with rolling walker Shift report given to oncoming nurse at the bedside.  
 
Yoshi Means RN

## 2019-04-12 NOTE — PROGRESS NOTES
Warfarin dosing per pharmacist 
 
Italo Doherty is a 33 y.o. female. Height: 4' 11\" (149.9 cm)    Weight: 73.5 kg (162 lb) Indication:  thrombosis R peroneal vein Goal INR:  2-3 Home dose:  New start Risk factors or significant drug interactions:  none Other anticoagulants:  Heparin bridge Daily Monitoring Date  INR     Warfarin dose HGB              Notes 4/8  1.0  ---  12.3 
-----  
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 Patient is on heparin drip. Will start with 5 mg this evening. Following daily INR starting tomorrow. Thank you, Jelly Bailon, Pharm. D. Clinical Pharmacist 
[phone]

## 2019-04-13 LAB
ALBUMIN SERPL-MCNC: 1.7 G/DL (ref 3.5–5)
ALBUMIN/GLOB SERPL: 0.6 {RATIO} (ref 1.2–3.5)
ALP SERPL-CCNC: 34 U/L (ref 50–136)
ALT SERPL-CCNC: 147 U/L (ref 12–65)
ANION GAP SERPL CALC-SCNC: 11 MMOL/L (ref 7–16)
APTT PPP: 68 SEC (ref 24.7–39.8)
APTT PPP: 73.7 SEC (ref 24.7–39.8)
AST SERPL-CCNC: 141 U/L (ref 15–37)
BACTERIA SPEC CULT: NORMAL
BILIRUB SERPL-MCNC: 0.4 MG/DL (ref 0.2–1.1)
BUN SERPL-MCNC: 50 MG/DL (ref 6–23)
CALCIUM SERPL-MCNC: 8.1 MG/DL (ref 8.3–10.4)
CHLORIDE SERPL-SCNC: 99 MMOL/L (ref 98–107)
CK SERPL-CCNC: 2508 U/L (ref 21–215)
CO2 SERPL-SCNC: 22 MMOL/L (ref 21–32)
CREAT SERPL-MCNC: 7.87 MG/DL (ref 0.6–1)
GLOBULIN SER CALC-MCNC: 2.7 G/DL (ref 2.3–3.5)
GLUCOSE SERPL-MCNC: 97 MG/DL (ref 65–100)
INR PPP: 1
POTASSIUM SERPL-SCNC: 3.7 MMOL/L (ref 3.5–5.1)
PROT SERPL-MCNC: 4.4 G/DL (ref 6.3–8.2)
PROTHROMBIN TIME: 12.8 SEC (ref 11.7–14.5)
SERVICE CMNT-IMP: NORMAL
SODIUM SERPL-SCNC: 132 MMOL/L (ref 136–145)

## 2019-04-13 PROCEDURE — 80053 COMPREHEN METABOLIC PANEL: CPT

## 2019-04-13 PROCEDURE — 85730 THROMBOPLASTIN TIME PARTIAL: CPT

## 2019-04-13 PROCEDURE — 82550 ASSAY OF CK (CPK): CPT

## 2019-04-13 PROCEDURE — 65270000029 HC RM PRIVATE

## 2019-04-13 PROCEDURE — 85610 PROTHROMBIN TIME: CPT

## 2019-04-13 PROCEDURE — 90935 HEMODIALYSIS ONE EVALUATION: CPT

## 2019-04-13 PROCEDURE — 74011250637 HC RX REV CODE- 250/637: Performed by: INTERNAL MEDICINE

## 2019-04-13 PROCEDURE — 74011250637 HC RX REV CODE- 250/637: Performed by: FAMILY MEDICINE

## 2019-04-13 PROCEDURE — 74011250636 HC RX REV CODE- 250/636: Performed by: INTERNAL MEDICINE

## 2019-04-13 PROCEDURE — 65660000000 HC RM CCU STEPDOWN

## 2019-04-13 RX ORDER — HEPARIN SODIUM 5000 [USP'U]/ML
35 INJECTION, SOLUTION INTRAVENOUS; SUBCUTANEOUS ONCE
Status: COMPLETED | OUTPATIENT
Start: 2019-04-13 | End: 2019-04-13

## 2019-04-13 RX ADMIN — METHYLPREDNISOLONE SODIUM SUCCINATE 60 MG: 125 INJECTION, POWDER, FOR SOLUTION INTRAMUSCULAR; INTRAVENOUS at 17:10

## 2019-04-13 RX ADMIN — Medication 100 MG: at 12:11

## 2019-04-13 RX ADMIN — AMLODIPINE BESYLATE 10 MG: 10 TABLET ORAL at 12:11

## 2019-04-13 RX ADMIN — MORPHINE SULFATE 2 MG: 2 INJECTION, SOLUTION INTRAMUSCULAR; INTRAVENOUS at 08:49

## 2019-04-13 RX ADMIN — WARFARIN SODIUM 5 MG: 5 TABLET ORAL at 17:10

## 2019-04-13 RX ADMIN — MORPHINE SULFATE 2 MG: 2 INJECTION, SOLUTION INTRAMUSCULAR; INTRAVENOUS at 21:23

## 2019-04-13 RX ADMIN — MORPHINE SULFATE 2 MG: 2 INJECTION, SOLUTION INTRAMUSCULAR; INTRAVENOUS at 04:43

## 2019-04-13 RX ADMIN — HYDROCODONE BITARTRATE AND ACETAMINOPHEN 1 TABLET: 10; 325 TABLET ORAL at 00:07

## 2019-04-13 RX ADMIN — HEPARIN SODIUM 2650 UNITS: 5000 INJECTION INTRAVENOUS; SUBCUTANEOUS at 19:17

## 2019-04-13 RX ADMIN — HEPARIN SODIUM 2650 UNITS: 5000 INJECTION INTRAVENOUS; SUBCUTANEOUS at 12:14

## 2019-04-13 RX ADMIN — LORAZEPAM 1 MG: 2 INJECTION INTRAMUSCULAR; INTRAVENOUS at 13:50

## 2019-04-13 RX ADMIN — MORPHINE SULFATE 2 MG: 2 INJECTION, SOLUTION INTRAMUSCULAR; INTRAVENOUS at 12:23

## 2019-04-13 RX ADMIN — HYDROCODONE BITARTRATE AND ACETAMINOPHEN 1 TABLET: 10; 325 TABLET ORAL at 13:50

## 2019-04-13 RX ADMIN — FOLIC ACID 1 MG: 1 TABLET ORAL at 12:11

## 2019-04-13 RX ADMIN — HYDROCODONE BITARTRATE AND ACETAMINOPHEN 1 TABLET: 10; 325 TABLET ORAL at 20:02

## 2019-04-13 RX ADMIN — HEPARIN SODIUM 20 UNITS/KG/HR: 5000 INJECTION, SOLUTION INTRAVENOUS at 07:03

## 2019-04-13 RX ADMIN — MORPHINE SULFATE 2 MG: 2 INJECTION, SOLUTION INTRAMUSCULAR; INTRAVENOUS at 17:10

## 2019-04-13 NOTE — PROGRESS NOTES
IV heparin rate has been adjusted based on the most recent PTT results. Lab Results Component Value Date/Time  
 aPTT 73.7 (H) 04/13/2019 06:21 PM  
 
 
Hep rate increased to 24units/kg based on recent PTT results.

## 2019-04-13 NOTE — PROGRESS NOTES
IV heparin rate has been adjusted based on the most recent PTT results. Lab Results Component Value Date/Time  
 aPTT 68.0 (H) 04/13/2019 06:10 AM  
 
 
Heparin drip rate increased to 22units/kg. Based on the most recent PTT results.

## 2019-04-13 NOTE — PROGRESS NOTES
END OF SHIFT NOTE: 
 
INTAKE/OUTPUT 
04/12 0701 - 04/13 0700 In: 1102 [I.V.:1102] Out: 350 [Urine:350] Voiding: NO 
Catheter: YES Drain:   
 
 
 
 
 
Flatus: Patient does have flatus present. Stool:  0 occurrences. Characteristics: 
Stool Assessment Stool Color: Jayson Left Stool Appearance: Loose Stool Amount: Medium Stool Source/Status: Rectum Emesis: 0 occurrences. Characteristics: VITAL SIGNS Patient Vitals for the past 12 hrs: 
 Temp Pulse Resp BP SpO2  
04/13/19 0416 98 °F (36.7 °C) (!) 114 18 137/87 98 % 04/13/19 0005 98.1 °F (36.7 °C) (!) 111 19 (!) 153/96 100 % 04/12/19 1948 97.5 °F (36.4 °C) (!) 106 19 142/90 98 % Pain Assessment Pain Intensity 1: 5 (04/13/19 0007) Pain Location 1: Leg 
Pain Intervention(s) 1: Medication (see MAR) Patient Stated Pain Goal: 2 Ambulating Yes Shift report given to oncoming nurse at the bedside.  
 
Pollo Ortiz RN

## 2019-04-13 NOTE — PROGRESS NOTES
Discussed with Dr. Fernando Lundberg Rheumatologist over the phone Recommended to try steroid taper for suspected myositis as could help with muscle inflammation and recovery. She recommended to call her back in patient is not improving after taper.

## 2019-04-13 NOTE — PROGRESS NOTES
Hospitalist Progress Note 2019 Admit Date: 2019  4:59 PM  
NAME: Adina Covarrubias :  1990 DOS:              19 MRN:  [id number] Attending: Grace Lazo MD 
PCP:  None Treatment Team: Attending Provider: Mary Jane Siddiqui MD; Consulting Provider: Brooklynn Gutierrez MD; Care Manager: Melo Ba RN; Utilization Review: Luda Chavez RN Full Code SUBJECTIVE: As previously documented: 32 yo female with PMH of polysubstance use who is evaluated with malaise and bilateral hip pain. She has UDS showing amphetamines, benzo, opiates. In the ED patient was found to have UA (+) for UTI, CIRA ,rhabdo and elevated LFTs. CT brain and CXR with no acute disease. Patient also was found to have a R peroneal vein thrombosis on US, started on heparin drip. Vascular evaluated patient for suspected stenosis of L external iliac artery with no acute intervention recommended. Nephrology following due to CIRA, no renal recovery so far requiring HD.  
 
 
 19    Julia Hernandez stated pain medication is helping with pain on LE. Denies any other complaints. Evaluated at HD unit. 10+ ROS reviewed and negative except for positive in HPI. Allergies Allergen Reactions  Dilantin [Phenytoin Sodium Extended] Itching  Keppra [Levetiracetam] Rash Current Facility-Administered Medications Medication Dose Route Frequency  warfarin (COUMADIN) tablet 5 mg  5 mg Oral QPM  
 amLODIPine (NORVASC) tablet 10 mg  10 mg Oral DAILY  hydrALAZINE (APRESOLINE) 20 mg/mL injection 10 mg  10 mg IntraVENous Q6H PRN  
 diphenhydrAMINE (BENADRYL) capsule 25 mg  25 mg Oral DIALYSIS PRN  
 HYDROcodone-acetaminophen (NORCO)  mg tablet 1 Tab  1 Tab Oral Q6H PRN  
 diphenhydrAMINE (BENADRYL) capsule 25 mg  25 mg Oral Q6H PRN  thiamine HCL (B-1) tablet 100 mg  100 mg Oral DAILY  folic acid (FOLVITE) tablet 1 mg  1 mg Oral DAILY  heparin 25,000 units in dextrose 500 mL infusion  18-36 Units/kg/hr IntraVENous TITRATE  sodium chloride (NS) flush 5-10 mL  5-10 mL IntraVENous PRN  
 ondansetron (ZOFRAN) injection 4 mg  4 mg IntraVENous Q6H PRN  
 nicotine (NICODERM CQ) 21 mg/24 hr patch 1 Patch  1 Patch TransDERmal DAILY  LORazepam (ATIVAN) injection 1 mg  1 mg IntraVENous Q6H PRN  
 morphine injection 2 mg  2 mg IntraVENous Q4H PRN  
 naloxone (NARCAN) injection 0.4 mg  0.4 mg IntraVENous EVERY 2 MINUTES AS NEEDED There is no immunization history on file for this patient. Objective:  
 
Patient Vitals for the past 24 hrs: 
 Temp Pulse Resp BP SpO2  
19 1119 98.2 °F (36.8 °C) (!) 120 16 133/77 99 % 19 1043  (!) 113 14 127/81   
19 1029  (!) 112  123/87   
19 1002  (!) 108  124/82   
19 0931  (!) 109  114/83   
19 0902  (!) 109  128/80   
19 0833  (!) 104  124/52   
19 0757  (!) 108  (!) 127/91   
19 0720  (!) 108 20 (!) 147/92   
19 0416 98 °F (36.7 °C) (!) 114 18 137/87 98 % 19 0005 98.1 °F (36.7 °C) (!) 111 19 (!) 153/96 100 % 19 1948 97.5 °F (36.4 °C) (!) 106 19 142/90 98 % 19 1525 97.9 °F (36.6 °C) (!) 103 18 128/83 97 % 19 1231 97.7 °F (36.5 °C) (!) 110 18 130/87 99 % Temp (24hrs), Av.9 °F (36.6 °C), Min:97.5 °F (36.4 °C), Max:98.2 °F (36.8 °C) Oxygen Therapy O2 Sat (%): 99 % (19) Pulse via Oximetry: 127 beats per minute (19) O2 Device: Room air (19 1100) Oxygen Therapy O2 Sat (%): 99 % (19) Pulse via Oximetry: 127 beats per minute (19) O2 Device: Room air (19 1100) Physical Exam: 
General:         Alert, cooperative, no distress HEENT:               NCAT. No obvious deformity. Lungs: No wheezing/rhonchi/rales Cardiovascular:   RRR. No m/r/g. Abdomen:       S/nt/nd. Bowel sounds normal. . Skin:         Diffused generalized pain Neurologic:    No gross focal deficit. Psychiatric:         Good mood. Normal affect. Extremities:         Some diffuse tenderness b/l LE. Trace pedal edema. (+) pedal pulses DIAGNOSTIC STUDIES Data Review:  
Recent Results (from the past 24 hour(s)) CK Collection Time: 04/13/19  6:10 AM  
Result Value Ref Range CK 2,508 (H) 21 - 104 U/L  
METABOLIC PANEL, COMPREHENSIVE Collection Time: 04/13/19  6:10 AM  
Result Value Ref Range Sodium 132 (L) 136 - 145 mmol/L Potassium 3.7 3.5 - 5.1 mmol/L Chloride 99 98 - 107 mmol/L  
 CO2 22 21 - 32 mmol/L Anion gap 11 7 - 16 mmol/L Glucose 97 65 - 100 mg/dL BUN 50 (H) 6 - 23 MG/DL Creatinine 7.87 (H) 0.6 - 1.0 MG/DL  
 GFR est AA 8 (L) >60 ml/min/1.73m2 GFR est non-AA 6 (L) >60 ml/min/1.73m2 Calcium 8.1 (L) 8.3 - 10.4 MG/DL Bilirubin, total 0.4 0.2 - 1.1 MG/DL  
 ALT (SGPT) 147 (H) 12 - 65 U/L  
 AST (SGOT) 141 (H) 15 - 37 U/L Alk. phosphatase 34 (L) 50 - 136 U/L Protein, total 4.4 (L) 6.3 - 8.2 g/dL Albumin 1.7 (L) 3.5 - 5.0 g/dL Globulin 2.7 2.3 - 3.5 g/dL A-G Ratio 0.6 (L) 1.2 - 3.5 PROTHROMBIN TIME + INR Collection Time: 04/13/19  6:10 AM  
Result Value Ref Range Prothrombin time 12.8 11.7 - 14.5 sec INR 1.0    
PTT Collection Time: 04/13/19  6:10 AM  
Result Value Ref Range aPTT 68.0 (H) 24.7 - 39.8 SEC All Micro Results Procedure Component Value Units Date/Time CULTURE, BLOOD [[id number]] Collected:  04/07/19 3180 Order Status:  Completed Specimen:  Blood Updated:  04/13/19 7329 Special Requests: --     
  LEFT 
ARM Culture result: NO GROWTH 5 DAYS     
 CULTURE, BLOOD [[id number]] Collected:  04/06/19 2032 Order Status:  Completed Specimen:  Blood Updated:  04/11/19 6401 Special Requests: --     
  LEFT 
HAND Culture result: NO GROWTH 5 DAYS     
 CULTURE, URINE [[id number]] Collected:  04/06/19 2219 Order Status:  Completed Specimen:  Cath Urine Updated:  19 Special Requests: NO SPECIAL REQUESTS Culture result: NO GROWTH 2 DAYS     
 CULTURE, URINE [[id number]] Collected:  19 Order Status:  Canceled Specimen:  Urine from Clean catch CULTURE, BLOOD [[id number]] Collected:  19 Order Status:  Canceled Specimen:  Blood Imaging Chaneta Clarke /Studies: CXR Results  (Last 48 hours) None CT Results  (Last 48 hours) None No results found. Results for orders placed or performed during the hospital encounter of 19  
2D ECHO COMPLETE ADULT (TTE) W OR WO CONTR Narrative [facility] [address] [place], [address] [facility] 
[phone] Transthoracic Echocardiogram 
2D, M-mode, Doppler, and Color Doppler Patient: Yeny Cameron MR #: [id number] : 1990 Age: 33 years Gender: Female Study date: 2019 Account #: [de-identified] Height: 59 in 
Weight: 124.7 lb 
BSA: 1.51 mï¾² Status:Routine Location: 205 BP: 120/ 78 Allergies: PHENYTOIN SODIUM EXTENDED, LEVETIRACETAM 
 
Sonographer:  Monty Marquez RD Group:  [facility] Referring Physician:  Jerome Mendoza. Chelsy Cody MD 
Reading Physician:  Chi Tovar. Santiago Lindsey MD [facility] INDICATIONS: PVD. *Per patient- unable to turn onto left side due to blood clot in leg. * PROCEDURE: This was a routine study. A transthoracic echocardiogram was 
performed. The study included complete 2D imaging, M-mode, complete spectral 
Doppler, and color Doppler. Echocardiographic views were limited by  
restricted 
patient mobility and poor acoustic window availability. Image quality was 
adequate. LEFT VENTRICLE: Size was normal. Systolic function was normal. Ejection 
fraction was estimated in the range of 55 % to 60 %. There were no regional 
wall motion abnormalities. Wall thickness was normal. The E/e' ratio was 6.2. Left ventricular diastolic function parameters were normal. 
 
RIGHT VENTRICLE: The size was normal. Systolic function was normal. The 
tricuspid jet envelope definition was inadequate for estimation of RV  
systolic 
pressure. LEFT ATRIUM: Size was normal. 
 
RIGHT ATRIUM: Size was normal. 
 
SYSTEMIC VEINS: IVC: The inferior vena cava was normal in size and course. AORTIC VALVE: The valve was structurally normal, tri-commissural. There was  
no 
evidence for stenosis. There was no insufficiency. MITRAL VALVE: Valve structure was normal. There was no evidence for stenosis. There was trivial regurgitation. TRICUSPID VALVE: The valve structure was normal. There was no evidence for 
stenosis. There was no regurgitation. PULMONIC VALVE: Not well visualized. There was no evidence for stenosis. There 
was trivial regurgitation. PERICARDIUM: There was no pericardial effusion. AORTA: The root exhibited normal size. SUMMARY: 
 
-  Left ventricle: Systolic function was normal. Ejection fraction was 
estimated in the range of 55 % to 60 %. There were no regional wall motion 
abnormalities. SYSTEM MEASUREMENT TABLES 
 
2D mode AoR Diam (2D): 2.5 cm 
LA Dimension (2D): 3 cm Left Atrium Systolic Volume Index; Method of Disks, Biplane; 2D mode;: 15.2 
ml/m2 IVS/LVPW (2D): 1.1 IVSd (2D): 1 cm LVIDd (2D): 3.8 cm LVIDs (2D): 2.5 cm 
LVOT Area (2D): 3.1 cm2 LVPWd (2D): 0.9 cm Tissue Doppler Imaging LV Peak Early Charles Tissue Win; Lateral MA (TDI): 17.2 cm/s LV Peak Early Charles Tissue Win; Medial MA (TDI): 8.2 cm/s Unspecified Scan Mode Peak Grad; Mean; Antegrade Flow: 8 mm[Hg] Vmax; Antegrade Flow: 134 cm/s LVOT Diam: 2 cm 
MV Peak Win/LV Peak Tissue Win E-Wave; Lateral MA: 4 MV Peak Win/LV Peak Tissue Win E-Wave; Medial MA: 8.4 Prepared and signed by 
 
Annalisa Nolasco. Lefty Ruiz MD [facility] Signed 08-Apr-2019 12:22:39 
  
 
 
 Labs and Studies from previous 24 hours have been personally reviewed by myself   
ASSESSMENT Active Hospital Problems Diagnosis Date Noted  Encephalopathy, toxic 04/08/2019  Toxic metabolic encephalopathy 67/62/4916  Rhabdomyolysis 04/07/2019  Sepsis ([facility] 04/06/2019  Hepatitis 04/06/2019  Substance use disorder 04/06/2019  Hip pain 04/06/2019  Hyponatremia 04/06/2019  CIRA (acute kidney injury) ([facility] 04/06/2019  UTI (urinary tract infection) 04/06/2019  Hypertension 04/06/2019  Asthma 05/19/2016 Last Assessment & Plan:  
Albuterol prn  Borderline personality disorder ([facility] 05/19/2016 Last Assessment & Plan:  
Continue Prozac, Seroquel, and trazodone Hospital Problems as of 4/13/2019 Date Reviewed: 4/7/2019 Codes Class Noted - Resolved POA Encephalopathy, toxic ICD-10-CM: G92 ICD-9-CM: 349.82  4/8/2019 - Present Unknown Toxic metabolic encephalopathy HCT-11-ZD: Y85 ICD-9-CM: 349.82  4/8/2019 - Present Unknown Rhabdomyolysis ICD-10-CM: G62.98 ICD-9-CM: 728.88  4/7/2019 - Present Yes * (Principal) Sepsis ([facility] ICD-10-CM: A41.9 ICD-9-CM: 038.9, 995.91  4/6/2019 - Present Yes Hepatitis ICD-10-CM: K75.9 ICD-9-CM: 573.3  4/6/2019 - Present Yes Substance use disorder (Chronic) ICD-10-CM: F19.90 ICD-9-CM: 305.90  4/6/2019 - Present Yes Hip pain ICD-10-CM: M25.559 ICD-9-CM: 719.45  4/6/2019 - Present Yes Hyponatremia ICD-10-CM: E87.1 ICD-9-CM: 276.1  4/6/2019 - Present Yes CIRA (acute kidney injury) ([facility] ICD-10-CM: N17.9 ICD-9-CM: 584.9  4/6/2019 - Present Yes  
   
 UTI (urinary tract infection) ICD-10-CM: N39.0 ICD-9-CM: 599.0  4/6/2019 - Present Yes Hypertension ICD-10-CM: I10 
ICD-9-CM: 401.9  4/6/2019 - Present Yes Asthma (Chronic) ICD-10-CM: J45.909 ICD-9-CM: 493.90  5/19/2016 - Present Yes  Overview Signed 4/7/2019  7:19 AM by GARRET Roth  
 Last Assessment & Plan:  
Albuterol prn Borderline personality disorder ([facility] (Chronic) ICD-10-CM: F60.3 ICD-9-CM: 301.83  5/19/2016 - Present Yes Overview Signed 4/7/2019  7:19 AM by GARRET Gomez Last Assessment & Plan:  
Continue Prozac, Seroquel, and trazodone A/P: 
 
 
-CIRA Likely secondary to rhabdo No signs of renal recovery yet S/p PT/ OT evaluation For HD today 
 
-Rhabdo CPK trending down Cont to trend 
 
-Rt peroneal vein dvt 
?due to trauma Cont heparin drip/warfarin bridging 
 
-RLE tenderness and swelling ? myositis due to amphetamine vs edema from CIRA Patient still having pain and swelling on R side Repeated US with partial thrombosis R peroneal vein. No DVT above the knee CPK is trending down Hopefully HD will improve edema Will contact Rheumatology to ask for recommendations 
 
-Elevated LFTs Likely due to rhabdo Trending down Viral hepatitis panel and HIV neg Cont to trend 
 
-R labia majora swelling Stable GC and chlamydia neg -HTN Stable Cont  amlodipine 10 mg and  hydralazine PRN 
 
-Sepsis secondary to ? UTI Blood and urine cultures NGTD ID evaluation appreciated. Findings likely secondary to rhabdo Abxs stopped 04/10 
 
- Toxic encephalopathy Likely secondary to amphetamines Resolved DVT Prophylaxis: heparin CODE Status: Full Plan of Care Discussed with: patient. Care team. 
 
 
Pino Huynh MD 
04/13/19

## 2019-04-13 NOTE — PROGRESS NOTES
END OF SHIFT NOTE: 
 
INTAKE/OUTPUT 
04/12 0701 - 04/13 0700 In: 1102 [I.V.:1102] Out: 350 [Urine:350] Voiding: NO 
Catheter: YES Drain:   
 
 
 
 
 
Flatus: Patient does have flatus present. Stool:  1 occurrences. Characteristics: 
Stool Assessment Stool Color: [place] Stool Appearance: Loose Stool Amount: Medium Stool Source/Status: Rectum Emesis: 0 occurrences. Characteristics: VITAL SIGNS Patient Vitals for the past 12 hrs: 
 Temp Pulse Resp BP SpO2  
04/13/19 1507 98.1 °F (36.7 °C) 67 18 132/81 99 % 04/13/19 1119 98.2 °F (36.8 °C) (!) 120 16 133/77 99 % 04/13/19 1043  (!) 113 14 127/81   
04/13/19 1029  (!) 112  123/87   
04/13/19 1002  (!) 108  124/82   
04/13/19 0931  (!) 109  114/83   
04/13/19 0902  (!) 109  128/80   
04/13/19 0833  (!) 104  124/52   
04/13/19 0757  (!) 108  (!) 127/91   
04/13/19 0720  (!) 108 20 (!) 147/92  Pain Assessment Pain Intensity 1: 7 (04/13/19 1830) Pain Location 1: Leg 
Pain Intervention(s) 1: Emotional support, Family Support Patient Stated Pain Goal: 2 Ambulating Yes, short distances w/rolling walker (in room) Shift report given to oncoming nurse at the bedside.  
 
Roseline Riedel, RN

## 2019-04-13 NOTE — DIALYSIS
TRANSFER OUT- DIALYSIS Hemodialysis treatment completed without complications. Patient  Sleepy  and VS stabl  /  P 113 
 
 2.1 Kgs removed. Flushed both ports with 10 mL of NS.  CVC dressing clean, dry, and intact, tego caps intact, bilateral lumens wrapped with 4x4 gauze. Patient to 205 after dialysis.

## 2019-04-13 NOTE — PROGRESS NOTES
Warfarin dosing per pharmacist 
 
Jesus Fong is a 33 y.o. female. Height: 4' 11\" (149.9 cm)    Weight: 76.3 kg (168 lb 4.8 oz) Indication:  thrombosis R peroneal vein Goal INR:  2-3 Home dose:  New start Risk factors or significant drug interactions:  none Other anticoagulants:  Heparin bridge Daily Monitoring Date  INR     Warfarin dose HGB              Notes 4/8  1.0  ---  12.3 
-----  
4/12  ---  5 mg  9.7 4/13  1  5 mg  --- Pharmacy consulted to dose and monitor warfarin for Ms. Hernandez with a R peroneal vein thrombosis. Continue heparin drip until INR is therapeutic X48h. I will continue with 5 mg this evening. Following daily INR for dose adjustments. Thank you, Monalisa Cannon, PharmD, [facility] Clinical Pharmacist 
[phone]

## 2019-04-13 NOTE — DIALYSIS
TRANSFER IN - DIALYSIS Received patient in dialysis unit  from [facility] (unit) for ordered procedure. Consent verified for renal replacement therapy. Patient alert and oriented and vital signs stable. BP- 147/92, HR- 108 Hemodialysis initiated using right perm cath. Aspirated and flushed both ports without difficulty. Dressing clean, dry and intact. Machine settings per MD order. Heparin 0 unit bolus and 0  units/hr. Will monitor during treatment.

## 2019-04-13 NOTE — PROGRESS NOTES
[facility] PROGRESS NOTE Follow up for: CIRA Subjective:  
 
Gen - no fever, no chills, appetite okay CV - no chest pain, no orthopnea Lung - no shortness of breath, no cough Abd - no tenderness, no nausea, no vomiting Ext - trace edema Objective:  
Exam: 
Vitals:  
 04/13/19 2373 04/13/19 3722 04/13/19 0151 04/13/19 3664 BP: (!) 147/92 (!) 127/91 124/52 128/80 Pulse: (!) 108 (!) 108 (!) 104 (!) 109 Resp: 20 Temp:      
SpO2:      
Weight:      
Height:      
 
 
 
Intake/Output Summary (Last 24 hours) at 4/13/2019 0834 Last data filed at 4/13/2019 3668 Gross per 24 hour Intake 465 ml Output 350 ml Net 115 ml  
 
 
Current Facility-Administered Medications Medication Dose Route Frequency  warfarin (COUMADIN) tablet 5 mg  5 mg Oral QPM  
 amLODIPine (NORVASC) tablet 10 mg  10 mg Oral DAILY  hydrALAZINE (APRESOLINE) 20 mg/mL injection 10 mg  10 mg IntraVENous Q6H PRN  
 diphenhydrAMINE (BENADRYL) capsule 25 mg  25 mg Oral DIALYSIS PRN  
 HYDROcodone-acetaminophen (NORCO)  mg tablet 1 Tab  1 Tab Oral Q6H PRN  
 diphenhydrAMINE (BENADRYL) capsule 25 mg  25 mg Oral Q6H PRN  thiamine HCL (B-1) tablet 100 mg  100 mg Oral DAILY  folic acid (FOLVITE) tablet 1 mg  1 mg Oral DAILY  heparin 25,000 units in dextrose 500 mL infusion  18-36 Units/kg/hr IntraVENous TITRATE  sodium chloride (NS) flush 5-10 mL  5-10 mL IntraVENous PRN  
 ondansetron (ZOFRAN) injection 4 mg  4 mg IntraVENous Q6H PRN  
 nicotine (NICODERM CQ) 21 mg/24 hr patch 1 Patch  1 Patch TransDERmal DAILY  LORazepam (ATIVAN) injection 1 mg  1 mg IntraVENous Q6H PRN  
 morphine injection 2 mg  2 mg IntraVENous Q4H PRN  
 naloxone (NARCAN) injection 0.4 mg  0.4 mg IntraVENous EVERY 2 MINUTES AS NEEDED  
 
 
EXAM 
GEN - Alert, oriented, in no distress CV - S1, S2, RRR, no rub, murmur, or gallop Lung - clear to auscultation bilaterally Abd - soft, nontender, BS present Ext - trace BLE edema Access - Right TCC intact- no no evidence of infection Recent Labs 04/12/19 
9487 04/11/19 
2687 WBC  --  10.1 HGB 9.7* 10.3* HCT 28.1* 30.5* PLT  --  165 Recent Labs 04/13/19 
9298 04/12/19 
2321 04/11/19 
3110 * 136 136  
K 3.7 3.5 3.7 CL 99 102 105 CO2 22 26 21 BUN 50* 35* 42* CREA 7.87* 6.09* 7.03* CA 8.1* 7.8* 7.5*  
GLU 97 90 86 Assessment and Plan: This is a 32 yo WF with rhabdomyolysis likely related to amphetamine use. ATN- no sign of renal recovery yet 
-first HD 4/9 Seen on  HD Laura Martin MD

## 2019-04-14 LAB
ALBUMIN SERPL-MCNC: 1.8 G/DL (ref 3.5–5)
ALBUMIN/GLOB SERPL: 0.6 {RATIO} (ref 1.2–3.5)
ALP SERPL-CCNC: 33 U/L (ref 50–136)
ALT SERPL-CCNC: 125 U/L (ref 12–65)
ANION GAP SERPL CALC-SCNC: 8 MMOL/L (ref 7–16)
APTT PPP: 123 SEC (ref 24.7–39.8)
APTT PPP: 95.1 SEC (ref 24.7–39.8)
AST SERPL-CCNC: 101 U/L (ref 15–37)
BILIRUB SERPL-MCNC: 0.2 MG/DL (ref 0.2–1.1)
BUN SERPL-MCNC: 35 MG/DL (ref 6–23)
CALCIUM SERPL-MCNC: 8.3 MG/DL (ref 8.3–10.4)
CHLORIDE SERPL-SCNC: 102 MMOL/L (ref 98–107)
CK SERPL-CCNC: 1555 U/L (ref 21–215)
CO2 SERPL-SCNC: 26 MMOL/L (ref 21–32)
CREAT SERPL-MCNC: 6.57 MG/DL (ref 0.6–1)
GLOBULIN SER CALC-MCNC: 3.1 G/DL (ref 2.3–3.5)
GLUCOSE SERPL-MCNC: 148 MG/DL (ref 65–100)
HCT VFR BLD AUTO: 24.3 % (ref 35.8–46.3)
HGB BLD-MCNC: 8.3 G/DL (ref 11.7–15.4)
INR PPP: 1.2
POTASSIUM SERPL-SCNC: 4.5 MMOL/L (ref 3.5–5.1)
PROT SERPL-MCNC: 4.9 G/DL (ref 6.3–8.2)
PROTHROMBIN TIME: 15.2 SEC (ref 11.7–14.5)
SODIUM SERPL-SCNC: 136 MMOL/L (ref 136–145)

## 2019-04-14 PROCEDURE — 85610 PROTHROMBIN TIME: CPT

## 2019-04-14 PROCEDURE — 85730 THROMBOPLASTIN TIME PARTIAL: CPT

## 2019-04-14 PROCEDURE — 65270000029 HC RM PRIVATE

## 2019-04-14 PROCEDURE — 74011250637 HC RX REV CODE- 250/637: Performed by: FAMILY MEDICINE

## 2019-04-14 PROCEDURE — 74011250636 HC RX REV CODE- 250/636: Performed by: INTERNAL MEDICINE

## 2019-04-14 PROCEDURE — 85018 HEMOGLOBIN: CPT

## 2019-04-14 PROCEDURE — 82550 ASSAY OF CK (CPK): CPT

## 2019-04-14 PROCEDURE — 65660000000 HC RM CCU STEPDOWN

## 2019-04-14 PROCEDURE — 80053 COMPREHEN METABOLIC PANEL: CPT

## 2019-04-14 PROCEDURE — 74011250637 HC RX REV CODE- 250/637: Performed by: INTERNAL MEDICINE

## 2019-04-14 RX ADMIN — MORPHINE SULFATE 2 MG: 2 INJECTION, SOLUTION INTRAMUSCULAR; INTRAVENOUS at 06:37

## 2019-04-14 RX ADMIN — AMLODIPINE BESYLATE 10 MG: 10 TABLET ORAL at 07:42

## 2019-04-14 RX ADMIN — METHYLPREDNISOLONE SODIUM SUCCINATE 60 MG: 125 INJECTION, POWDER, FOR SOLUTION INTRAMUSCULAR; INTRAVENOUS at 21:39

## 2019-04-14 RX ADMIN — MORPHINE SULFATE 2 MG: 2 INJECTION, SOLUTION INTRAMUSCULAR; INTRAVENOUS at 22:08

## 2019-04-14 RX ADMIN — METHYLPREDNISOLONE SODIUM SUCCINATE 60 MG: 125 INJECTION, POWDER, FOR SOLUTION INTRAMUSCULAR; INTRAVENOUS at 07:45

## 2019-04-14 RX ADMIN — FOLIC ACID 1 MG: 1 TABLET ORAL at 07:41

## 2019-04-14 RX ADMIN — HYDROCODONE BITARTRATE AND ACETAMINOPHEN 1 TABLET: 10; 325 TABLET ORAL at 14:12

## 2019-04-14 RX ADMIN — Medication 5 ML: at 14:13

## 2019-04-14 RX ADMIN — MORPHINE SULFATE 2 MG: 2 INJECTION, SOLUTION INTRAMUSCULAR; INTRAVENOUS at 11:56

## 2019-04-14 RX ADMIN — DIPHENHYDRAMINE HYDROCHLORIDE 25 MG: 25 CAPSULE ORAL at 07:41

## 2019-04-14 RX ADMIN — MORPHINE SULFATE 2 MG: 2 INJECTION, SOLUTION INTRAMUSCULAR; INTRAVENOUS at 02:09

## 2019-04-14 RX ADMIN — MORPHINE SULFATE 2 MG: 2 INJECTION, SOLUTION INTRAMUSCULAR; INTRAVENOUS at 16:59

## 2019-04-14 RX ADMIN — HYDROCODONE BITARTRATE AND ACETAMINOPHEN 1 TABLET: 10; 325 TABLET ORAL at 04:29

## 2019-04-14 RX ADMIN — HYDROCODONE BITARTRATE AND ACETAMINOPHEN 1 TABLET: 10; 325 TABLET ORAL at 21:38

## 2019-04-14 RX ADMIN — WARFARIN SODIUM 7 MG: 5 TABLET ORAL at 17:01

## 2019-04-14 RX ADMIN — Medication 100 MG: at 07:42

## 2019-04-14 NOTE — PROGRESS NOTES
Hospitalist Progress Note 2019 Admit Date: 2019  4:59 PM  
NAME: Brice Lam :  1990 DOS:              19 MRN:  [id number] Attending: Ankit Kim MD 
PCP:  None Treatment Team: Attending Provider: Shubham Bui MD; Consulting Provider: Segundo Waters MD; Care Manager: Beverly De La Torre, RN; Utilization Review: Anna Rico RN; Consulting Provider: Verner Keys, MD 
 
Full Code SUBJECTIVE: As previously documented: 34 yo female with PMH of polysubstance use who is evaluated with malaise and bilateral hip pain. She has UDS showing amphetamines, benzo, opiates. In the ED patient was found to have UA (+) for UTI, CIRA ,rhabdo and elevated LFTs. CT brain and CXR with no acute disease. Patient also was found to have a R peroneal vein thrombosis on US, started on heparin drip. Vascular evaluated patient for suspected stenosis of L external iliac artery with no acute intervention recommended. Nephrology following due to CIRA, no renal recovery so far requiring HD. Rheumatologist recommended trial of steroids taper for suspected myositis. 19    Lena Hernandez stated pain is somewhat controlled. Denies any other complaints. 10+ ROS reviewed and negative except for positive in HPI. Allergies Allergen Reactions  Dilantin [Phenytoin Sodium Extended] Itching  Keppra [Levetiracetam] Rash Current Facility-Administered Medications Medication Dose Route Frequency  methylPREDNISolone (PF) (SOLU-MEDROL) injection 60 mg  60 mg IntraVENous Q12H  warfarin (COUMADIN) tablet 5 mg  5 mg Oral QPM  
 amLODIPine (NORVASC) tablet 10 mg  10 mg Oral DAILY  hydrALAZINE (APRESOLINE) 20 mg/mL injection 10 mg  10 mg IntraVENous Q6H PRN  
 diphenhydrAMINE (BENADRYL) capsule 25 mg  25 mg Oral DIALYSIS PRN  
 HYDROcodone-acetaminophen (NORCO)  mg tablet 1 Tab  1 Tab Oral Q6H PRN  
  diphenhydrAMINE (BENADRYL) capsule 25 mg  25 mg Oral Q6H PRN  thiamine HCL (B-1) tablet 100 mg  100 mg Oral DAILY  folic acid (FOLVITE) tablet 1 mg  1 mg Oral DAILY  heparin 25,000 units in dextrose 500 mL infusion  18-36 Units/kg/hr IntraVENous TITRATE  sodium chloride (NS) flush 5-10 mL  5-10 mL IntraVENous PRN  
 ondansetron (ZOFRAN) injection 4 mg  4 mg IntraVENous Q6H PRN  
 nicotine (NICODERM CQ) 21 mg/24 hr patch 1 Patch  1 Patch TransDERmal DAILY  LORazepam (ATIVAN) injection 1 mg  1 mg IntraVENous Q6H PRN  
 morphine injection 2 mg  2 mg IntraVENous Q4H PRN  
 naloxone (NARCAN) injection 0.4 mg  0.4 mg IntraVENous EVERY 2 MINUTES AS NEEDED There is no immunization history on file for this patient. Objective:  
 
Patient Vitals for the past 24 hrs: 
 Temp Pulse Resp BP SpO2  
19 0710 98 °F (36.7 °C) (!) 106 17 122/78 96 % 19 0429  (!) 6     
19 0325 98.1 °F (36.7 °C) (!) 114 18 138/83 98 % 19 2339 98.3 °F (36.8 °C) (!) 113 17 122/77 94 % 19 2051 98.2 °F (36.8 °C) (!) 114 18 134/82 95 % 19 1507 98.1 °F (36.7 °C) 67 18 132/81 99 % 19 1119 98.2 °F (36.8 °C) (!) 120 16 133/77 99 % 19 1043  (!) 113 14 127/81   
19 1029  (!) 112  123/87   
19 1002  (!) 108  124/82  Temp (24hrs), Av.2 °F (36.8 °C), Min:98 °F (36.7 °C), Max:98.3 °F (36.8 °C) Oxygen Therapy O2 Sat (%): 96 % (19 0710) Pulse via Oximetry: 127 beats per minute (199) O2 Device: Room air (19) Oxygen Therapy O2 Sat (%): 96 % (19 0710) Pulse via Oximetry: 127 beats per minute (19) O2 Device: Room air (19) Physical Exam: 
General:         Alert, cooperative, no distress HEENT:               NCAT. No obvious deformity. Lungs: No wheezing/rhonchi/rales Cardiovascular:   RRR. No m/r/g. Abdomen:       S/nt/nd. Bowel sounds normal. . Skin:         Diffused generalized pain Neurologic:    No gross focal deficit. Psychiatric:         Good mood. Normal affect. Extremities:         Some diffuse tenderness better today b/l LE. Trace pedal edema. (+) pedal pulses DIAGNOSTIC STUDIES Data Review:  
Recent Results (from the past 24 hour(s)) PTT Collection Time: 04/13/19  6:21 PM  
Result Value Ref Range aPTT 73.7 (H) 24.7 - 39.8 SEC  
PTT Collection Time: 04/14/19  1:43 AM  
Result Value Ref Range aPTT 123.0 (H) 24.7 - 39.8 SEC  
CK Collection Time: 04/14/19  6:31 AM  
Result Value Ref Range CK 1,555 (H) 21 - 334 U/L  
METABOLIC PANEL, COMPREHENSIVE Collection Time: 04/14/19  6:31 AM  
Result Value Ref Range Sodium 136 136 - 145 mmol/L Potassium 4.5 3.5 - 5.1 mmol/L Chloride 102 98 - 107 mmol/L  
 CO2 26 21 - 32 mmol/L Anion gap 8 7 - 16 mmol/L Glucose 148 (H) 65 - 100 mg/dL BUN 35 (H) 6 - 23 MG/DL Creatinine 6.57 (H) 0.6 - 1.0 MG/DL  
 GFR est AA 10 (L) >60 ml/min/1.73m2 GFR est non-AA 8 (L) >60 ml/min/1.73m2 Calcium 8.3 8.3 - 10.4 MG/DL Bilirubin, total 0.2 0.2 - 1.1 MG/DL  
 ALT (SGPT) 125 (H) 12 - 65 U/L  
 AST (SGOT) 101 (H) 15 - 37 U/L Alk. phosphatase 33 (L) 50 - 136 U/L Protein, total 4.9 (L) 6.3 - 8.2 g/dL Albumin 1.8 (L) 3.5 - 5.0 g/dL Globulin 3.1 2.3 - 3.5 g/dL A-G Ratio 0.6 (L) 1.2 - 3.5 PROTHROMBIN TIME + INR Collection Time: 04/14/19  6:31 AM  
Result Value Ref Range Prothrombin time 15.2 (H) 11.7 - 14.5 sec INR 1.2 HGB & HCT Collection Time: 04/14/19  6:31 AM  
Result Value Ref Range HGB 8.3 (L) 11.7 - 15.4 g/dL HCT 24.3 (L) 35.8 - 46.3 % All Micro Results Procedure Component Value Units Date/Time CULTURE, BLOOD [[id number]] Collected:  04/07/19 2130 Order Status:  Completed Specimen:  Blood Updated:  04/13/19 5637 Special Requests: --     
  LEFT 
ARM Culture result: NO GROWTH 5 DAYS CULTURE, BLOOD [[id number]] Collected:  19 Order Status:  Completed Specimen:  Blood Updated:  19 6438 Special Requests: --     
  LEFT 
HAND Culture result: NO GROWTH 5 DAYS     
 CULTURE, URINE [[id number]] Collected:  19 1857 Order Status:  Completed Specimen:  Cath Urine Updated:  19 3059 Special Requests: NO SPECIAL REQUESTS Culture result: NO GROWTH 2 DAYS     
 CULTURE, URINE [[id number]] Collected:  19 214 Order Status:  Canceled Specimen:  Urine from Clean catch CULTURE, BLOOD [[id number]] Collected:  19 Order Status:  Canceled Specimen:  Blood Imaging Melonie Ravi /Studies: CXR Results  (Last 48 hours) None CT Results  (Last 48 hours) None No results found. Results for orders placed or performed during the hospital encounter of 19  
2D ECHO COMPLETE ADULT (TTE) W OR WO CONTR [facility]ative [facility] [place], [address] [facility] 
[phone] Transthoracic Echocardiogram 
2D, M-mode, Doppler, and Color Doppler Patient: Sully Siddiqi MR #: [id number] : 1990 Age: 33 years Gender: Female Study date: 2019 Account #: [de-identified] Height: 59 in 
Weight: 124.7 lb 
BSA: 1.51 mï¾² Status:Routine Location: 205 BP: 120/ 78 Allergies: PHENYTOIN SODIUM EXTENDED, LEVETIRACETAM 
 
Sonographer:  Guerda Magdaleno [facility] Group:  [facility] Referring Physician:  Swetha Sarabia. Aisha Espinoza MD 
Reading Physician:  Ambreen Xavier MD [facility] INDICATIONS: PVD. *Per patient- unable to turn onto left side due to blood clot in leg. * PROCEDURE: This was a routine study. A transthoracic echocardiogram was 
performed. The study included complete 2D imaging, M-mode, complete spectral 
Doppler, and color Doppler. Echocardiographic views were limited by  
restricted patient mobility and poor acoustic window availability. Image quality was 
adequate. LEFT VENTRICLE: Size was normal. Systolic function was normal. Ejection 
fraction was estimated in the range of 55 % to 60 %. There were no regional 
wall motion abnormalities. Wall thickness was normal. The E/e' ratio was 6.2. Left ventricular diastolic function parameters were normal. 
 
RIGHT VENTRICLE: The size was normal. Systolic function was normal. The 
tricuspid jet envelope definition was inadequate for estimation of RV  
systolic 
pressure. LEFT ATRIUM: Size was normal. 
 
RIGHT ATRIUM: Size was normal. 
 
SYSTEMIC VEINS: IVC: The inferior vena cava was normal in size and course. AORTIC VALVE: The valve was structurally normal, tri-commissural. There was  
no 
evidence for stenosis. There was no insufficiency. MITRAL VALVE: Valve structure was normal. There was no evidence for stenosis. There was trivial regurgitation. TRICUSPID VALVE: The valve structure was normal. There was no evidence for 
stenosis. There was no regurgitation. PULMONIC VALVE: Not well visualized. There was no evidence for stenosis. There 
was trivial regurgitation. PERICARDIUM: There was no pericardial effusion. AORTA: The root exhibited normal size. SUMMARY: 
 
-  Left ventricle: Systolic function was normal. Ejection fraction was 
estimated in the range of 55 % to 60 %. There were no regional wall motion 
abnormalities. SYSTEM MEASUREMENT TABLES 
 
2D mode AoR Diam (2D): 2.5 cm 
LA Dimension (2D): 3 cm Left Atrium Systolic Volume Index; Method of Disks, Biplane; 2D mode;: 15.2 
ml/m2 IVS/LVPW (2D): 1.1 IVSd (2D): 1 cm LVIDd (2D): 3.8 cm LVIDs (2D): 2.5 cm 
LVOT Area (2D): 3.1 cm2 LVPWd (2D): 0.9 cm Tissue Doppler Imaging LV Peak Early Charles Tissue Win; Lateral MA (TDI): 17.2 cm/s LV Peak Early Charles Tissue Win; Medial MA (TDI): 8.2 cm/s Unspecified Scan Mode Peak Grad; Mean; Antegrade Flow: 8 mm[Hg] Vmax; Antegrade Flow: 134 cm/s LVOT Diam: 2 cm 
MV Peak Win/LV Peak Tissue Win E-Wave; Lateral MA: 4 MV Peak Win/LV Peak Tissue Win E-Wave; Medial MA: 8.4 Prepared and signed by 
 
Radha Brown. [address] Jameson MD [facility] Signed 08-Apr-2019 12:22:39 Labs and Studies from previous 24 hours have been personally reviewed by myself   
ASSESSMENT Active Hospital Problems Diagnosis Date Noted  Encephalopathy, toxic 04/08/2019  Toxic metabolic encephalopathy 82/52/0398  Rhabdomyolysis 04/07/2019  Sepsis ([facility] 04/06/2019  Hepatitis 04/06/2019  Substance use disorder 04/06/2019  Hip pain 04/06/2019  Hyponatremia 04/06/2019  CIRA (acute kidney injury) ([facility] 04/06/2019  UTI (urinary tract infection) 04/06/2019  Hypertension 04/06/2019  Asthma 05/19/2016 Last Assessment & Plan:  
Albuterol prn  Borderline personality disorder ([facility] 05/19/2016 Last Assessment & Plan:  
Continue Prozac, Seroquel, and trazodone Hospital Problems as of 4/14/2019 Date Reviewed: 4/7/2019 Codes Class Noted - Resolved POA Encephalopathy, toxic ICD-10-CM: G92 ICD-9-CM: 349.82  4/8/2019 - Present Unknown Toxic metabolic encephalopathy SAMY-63-AV: E63 ICD-9-CM: 349.82  4/8/2019 - Present Unknown Rhabdomyolysis ICD-10-CM: G83.60 ICD-9-CM: 728.88  4/7/2019 - Present Yes * (Principal) Sepsis ([facility] ICD-10-CM: A41.9 ICD-9-CM: 038.9, 995.91  4/6/2019 - Present Yes Hepatitis ICD-10-CM: K75.9 ICD-9-CM: 573.3  4/6/2019 - Present Yes Substance use disorder (Chronic) ICD-10-CM: F19.90 ICD-9-CM: 305.90  4/6/2019 - Present Yes Hip pain ICD-10-CM: M25.559 ICD-9-CM: 719.45  4/6/2019 - Present Yes Hyponatremia ICD-10-CM: E87.1 ICD-9-CM: 276.1  4/6/2019 - Present Yes [facility]) ([facility] ICD-10-CM: N17.9 ICD-9-CM: 584.9  4/6/2019 - Present Yes  
   
 UTI (urinary tract infection) ICD-10-CM: N39.0 ICD-9-CM: 599.0  4/6/2019 - Present Yes Hypertension ICD-10-CM: I10 
ICD-9-CM: 401.9  4/6/2019 - Present Yes Asthma (Chronic) ICD-10-CM: J45.909 ICD-9-CM: 493.90  5/19/2016 - Present Yes Overview Signed 4/7/2019  7:19 AM by GARRET Anders Last Assessment & Plan:  
Albuterol prn Borderline personality disorder ([facility] (Chronic) ICD-10-CM: F60.3 ICD-9-CM: 301.83  5/19/2016 - Present Yes Overview Signed 4/7/2019  7:19 AM by GARRET Anders Last Assessment & Plan:  
Continue Prozac, Seroquel, and trazodone A/P: 
 
 
-CIRA Likely secondary to rhabdo No signs of renal recovery yet Nephrology following 
 
-Morenita Martin CPK trending down Cont to trend 
 
-Rt peroneal vein dvt 
?due to trauma Cont heparin drip/warfarin bridging 
 
-RLE tenderness and swelling ? myositis due to amphetamine vs edema from CIRA Repeated US no DVT above the knee Rheumatology eval appreciated Hopefully HD will improve edema Cont IV  steroid taper Potential pain medication seeking behavior  
 
-Elevated LFTs Likely due to rhabdo Trending down Viral hepatitis panel and HIV neg Cont to trend -HTN Stable Cont  amlodipine 10 mg and  hydralazine PRN 
 
-Sepsis secondary to ? UTI Blood and urine cultures NGTD ID evaluation appreciated. Findings likely secondary to rhabdo Abxs stopped 04/10 
 
- Toxic encephalopathy Likely secondary to amphetamines Resolved 
 
-Anemia 
?due to Hemodilution Get iron studies Cont to monitor Hb 
 
 
DVT Prophylaxis: heparin CODE Status: Full Plan of Care Discussed with: patient. Care team. 
 
 
Elizabeth Doll MD 
04/14/19

## 2019-04-14 NOTE — PROGRESS NOTES
Warfarin dosing per pharmacist 
 
Rajwinder Castro is a 33 y.o. female. Height: 4' 11\" (149.9 cm)    Weight: 76.3 kg (168 lb 4.8 oz) Indication:  thrombosis R peroneal vein Goal INR:  2-3 Home dose:  New start Risk factors or significant drug interactions:  none Other anticoagulants:  Heparin bridge Daily Monitoring Date  INR     Warfarin dose HGB              Notes 4/8  1.0  ---  12.3 
-----  
4/12  ---  5 mg  9.7 4/13  1  5 mg  --- 
4/14  1.2  7 mg  8.3 Pharmacy consulted to dose and monitor warfarin for Ms. Hernandez with a R peroneal vein thrombosis. Continue heparin drip until INR is therapeutic X48h. INR has trended up appropriately. I will increase dose to 7mg tonight. Following daily INR for dose adjustments. Thank you, Monalisa Tsang, PharmD, BCCCP Clinical Pharmacist 
[phone]

## 2019-04-14 NOTE — PROGRESS NOTES
[facility] PROGRESS NOTE Follow up for: CIRA Subjective:  
 
Gen - no fever, no chills, appetite okay CV - no chest pain, no orthopnea Lung - no shortness of breath, no cough Abd - no tenderness, no nausea, no vomiting Ext - trace edema Objective:  
Exam: 
Vitals:  
 04/13/19 1900 04/14/19 0325 04/14/19 0429 04/14/19 0710 BP: 122/77 138/83  122/78 Pulse: (!) 113 (!) 114 (!) 6 (!) 106 Resp: 17 18 17 Temp: 98.3 °F (36.8 °C) 98.1 °F (36.7 °C)  98 °F (36.7 °C) SpO2: 94% 98%  96% Weight:  76.3 kg (168 lb 4.8 oz) Height:      
 
 
 
Intake/Output Summary (Last 24 hours) at 4/14/2019 1051 Last data filed at 4/14/2019 5692 Gross per 24 hour Intake 1024 ml Output 550 ml Net 474 ml Current Facility-Administered Medications Medication Dose Route Frequency  warfarin (COUMADIN) tablet 7 mg  7 mg Oral QPM  
 methylPREDNISolone (PF) (SOLU-MEDROL) injection 60 mg  60 mg IntraVENous Q12H  
 amLODIPine (NORVASC) tablet 10 mg  10 mg Oral DAILY  hydrALAZINE (APRESOLINE) 20 mg/mL injection 10 mg  10 mg IntraVENous Q6H PRN  
 diphenhydrAMINE (BENADRYL) capsule 25 mg  25 mg Oral DIALYSIS PRN  
 HYDROcodone-acetaminophen (NORCO)  mg tablet 1 Tab  1 Tab Oral Q6H PRN  
 diphenhydrAMINE (BENADRYL) capsule 25 mg  25 mg Oral Q6H PRN  thiamine HCL (B-1) tablet 100 mg  100 mg Oral DAILY  folic acid (FOLVITE) tablet 1 mg  1 mg Oral DAILY  heparin 25,000 units in dextrose 500 mL infusion  18-36 Units/kg/hr IntraVENous TITRATE  sodium chloride (NS) flush 5-10 mL  5-10 mL IntraVENous PRN  
 ondansetron (ZOFRAN) injection 4 mg  4 mg IntraVENous Q6H PRN  
 nicotine (NICODERM CQ) 21 mg/24 hr patch 1 Patch  1 Patch TransDERmal DAILY  LORazepam (ATIVAN) injection 1 mg  1 mg IntraVENous Q6H PRN  
 morphine injection 2 mg  2 mg IntraVENous Q4H PRN  
 naloxone (NARCAN) injection 0.4 mg  0.4 mg IntraVENous EVERY 2 MINUTES AS NEEDED  
 
 
EXAM 
 GEN - Alert, oriented, in no distress CV - S1, S2, RRR, no rub, murmur, or gallop Lung - clear to auscultation bilaterally Abd - soft, nontender, BS present Ext - trace BLE edema Access - Right TCC intact- no no evidence of infection Recent Labs 04/14/19 
0631 04/12/19 
4841 HGB 8.3* 9.7* HCT 24.3* 28.1* Recent Labs 04/14/19 
2527 04/13/19 
8578 04/12/19 
8200  132* 136  
K 4.5 3.7 3.5  99 102 CO2 26 22 26 BUN 35* 50* 35* CREA 6.57* 7.87* 6.09* CA 8.3 8.1* 7.8*  
* 97 90 Assessment and Plan: This is a 34 yo WF with rhabdomyolysis likely related to amphetamine use. ATN- no sign of renal recovery yet 
-first HD 4/9 HD next week Doreen Alatorre MD

## 2019-04-14 NOTE — PROGRESS NOTES
END OF SHIFT NOTE: 
 
INTAKE/OUTPUT 
04/13 0701 - 04/14 0700 In: 1024 [P.O.:480; I.V.:544] Out: 550 [Urine:550] Voiding: YES Catheter: NO 
Drain:   
 
 
 
 
 
Flatus: Patient does have flatus present. Stool:  0 occurrences. Characteristics: 
Stool Assessment Stool Color: Madmagz Stool Appearance: Loose Stool Amount: Medium Stool Source/Status: Rectum Emesis: 0 occurrences. Characteristics: VITAL SIGNS Patient Vitals for the past 12 hrs: 
 Temp Pulse Resp BP SpO2  
04/14/19 1529 98 °F (36.7 °C) (!) 107 19 128/81 96 % 04/14/19 1128 98.1 °F (36.7 °C) (!) 105 18 130/82 94 % Pain Assessment Pain Intensity 1: 7 (04/14/19 1413) Pain Location 1: Back, Leg 
Pain Intervention(s) 1: Medication (see MAR) Patient Stated Pain Goal: 2 Ambulating Yes Shift report given to oncoming nurse at the bedside.  
 
Ruiz Nolasco RN

## 2019-04-14 NOTE — PROGRESS NOTES
END OF SHIFT NOTE: 
 
INTAKE/OUTPUT 
04/13 0701 - 04/14 0700 In: 1024 [P.O.:480; I.V.:544] Out: 550 [Urine:550] Voiding: NO 
Catheter: YES Drain:   
 
 
 
 
 
Flatus: Patient does have flatus present. Stool:  0 occurrences. Characteristics: 
Stool Assessment Stool Color: Glendia Amel Stool Appearance: Loose Stool Amount: Medium Stool Source/Status: Rectum Emesis: 0 occurrences. Characteristics: VITAL SIGNS Patient Vitals for the past 12 hrs: 
 Temp Pulse Resp BP SpO2  
04/14/19 0429  (!) 6     
04/14/19 0325 98.1 °F (36.7 °C) (!) 114 18 138/83 98 % 04/13/19 2339 98.3 °F (36.8 °C) (!) 113 17 122/77 94 % 04/13/19 2051 98.2 °F (36.8 °C) (!) 114 18 134/82 95 % Pain Assessment Pain Intensity 1: 4 (04/14/19 0222) Pain Location 1: Leg 
Pain Intervention(s) 1: Emotional support, Family Support Patient Stated Pain Goal: 2 Ambulating Yes Shift report given to oncoming nurse at the bedside.  
 
Miracle Thompson RN

## 2019-04-15 LAB
ALBUMIN SERPL-MCNC: 2 G/DL (ref 3.5–5)
ALBUMIN/GLOB SERPL: 0.7 {RATIO} (ref 1.2–3.5)
ALP SERPL-CCNC: 33 U/L (ref 50–136)
ALT SERPL-CCNC: 114 U/L (ref 12–65)
ANION GAP SERPL CALC-SCNC: 9 MMOL/L (ref 7–16)
APTT PPP: 124.6 SEC (ref 24.7–39.8)
APTT PPP: 153.6 SEC (ref 24.7–39.8)
APTT PPP: 84.4 SEC (ref 24.7–39.8)
AST SERPL-CCNC: 80 U/L (ref 15–37)
BASOPHILS # BLD: 0 K/UL (ref 0–0.2)
BASOPHILS NFR BLD: 0 % (ref 0–2)
BILIRUB SERPL-MCNC: 0.3 MG/DL (ref 0.2–1.1)
BUN SERPL-MCNC: 49 MG/DL (ref 6–23)
CALCIUM SERPL-MCNC: 8.5 MG/DL (ref 8.3–10.4)
CHLORIDE SERPL-SCNC: 99 MMOL/L (ref 98–107)
CK SERPL-CCNC: 911 U/L (ref 21–215)
CO2 SERPL-SCNC: 26 MMOL/L (ref 21–32)
CREAT SERPL-MCNC: 7.91 MG/DL (ref 0.6–1)
DIFFERENTIAL METHOD BLD: ABNORMAL
EOSINOPHIL # BLD: 0 K/UL (ref 0–0.8)
EOSINOPHIL NFR BLD: 0 % (ref 0.5–7.8)
ERYTHROCYTE [DISTWIDTH] IN BLOOD BY AUTOMATED COUNT: 15.4 % (ref 11.9–14.6)
FERRITIN SERPL-MCNC: 197 NG/ML (ref 8–388)
GLOBULIN SER CALC-MCNC: 3 G/DL (ref 2.3–3.5)
GLUCOSE SERPL-MCNC: 149 MG/DL (ref 65–100)
HCT VFR BLD AUTO: 23.2 % (ref 35.8–46.3)
HGB BLD-MCNC: 8 G/DL (ref 11.7–15.4)
IMM GRANULOCYTES # BLD AUTO: 0.4 K/UL (ref 0–0.5)
IMM GRANULOCYTES NFR BLD AUTO: 2 % (ref 0–5)
INR PPP: 1.3
IRON SATN MFR SERPL: 43 %
IRON SERPL-MCNC: 87 UG/DL (ref 35–150)
IRON SERPL-MCNC: 87 UG/DL (ref 35–150)
LYMPHOCYTES # BLD: 1.6 K/UL (ref 0.5–4.6)
LYMPHOCYTES NFR BLD: 8 % (ref 13–44)
MCH RBC QN AUTO: 31.4 PG (ref 26.1–32.9)
MCHC RBC AUTO-ENTMCNC: 34.5 G/DL (ref 31.4–35)
MCV RBC AUTO: 91 FL (ref 79.6–97.8)
MONOCYTES # BLD: 0.5 K/UL (ref 0.1–1.3)
MONOCYTES NFR BLD: 2 % (ref 4–12)
NEUTS SEG # BLD: 17.9 K/UL (ref 1.7–8.2)
NEUTS SEG NFR BLD: 88 % (ref 43–78)
NRBC # BLD: 0 K/UL (ref 0–0.2)
PLATELET # BLD AUTO: 265 K/UL (ref 150–450)
PMV BLD AUTO: 10.9 FL (ref 9.4–12.3)
POTASSIUM SERPL-SCNC: 4.9 MMOL/L (ref 3.5–5.1)
PROT SERPL-MCNC: 5 G/DL (ref 6.3–8.2)
PROTHROMBIN TIME: 15.8 SEC (ref 11.7–14.5)
RBC # BLD AUTO: 2.55 M/UL (ref 4.05–5.2)
SODIUM SERPL-SCNC: 134 MMOL/L (ref 136–145)
TIBC SERPL-MCNC: 201 UG/DL (ref 250–450)
WBC # BLD AUTO: 20.4 K/UL (ref 4.3–11.1)

## 2019-04-15 PROCEDURE — 74011250636 HC RX REV CODE- 250/636: Performed by: INTERNAL MEDICINE

## 2019-04-15 PROCEDURE — 82550 ASSAY OF CK (CPK): CPT

## 2019-04-15 PROCEDURE — 74011250637 HC RX REV CODE- 250/637: Performed by: INTERNAL MEDICINE

## 2019-04-15 PROCEDURE — 74011250637 HC RX REV CODE- 250/637: Performed by: FAMILY MEDICINE

## 2019-04-15 PROCEDURE — 85610 PROTHROMBIN TIME: CPT

## 2019-04-15 PROCEDURE — 80053 COMPREHEN METABOLIC PANEL: CPT

## 2019-04-15 PROCEDURE — 85730 THROMBOPLASTIN TIME PARTIAL: CPT

## 2019-04-15 PROCEDURE — 85025 COMPLETE CBC W/AUTO DIFF WBC: CPT

## 2019-04-15 PROCEDURE — 83540 ASSAY OF IRON: CPT

## 2019-04-15 PROCEDURE — 65270000029 HC RM PRIVATE

## 2019-04-15 PROCEDURE — 65660000000 HC RM CCU STEPDOWN

## 2019-04-15 PROCEDURE — 80307 DRUG TEST PRSMV CHEM ANLYZR: CPT

## 2019-04-15 PROCEDURE — 74011250636 HC RX REV CODE- 250/636: Performed by: FAMILY MEDICINE

## 2019-04-15 PROCEDURE — 90935 HEMODIALYSIS ONE EVALUATION: CPT

## 2019-04-15 PROCEDURE — 82728 ASSAY OF FERRITIN: CPT

## 2019-04-15 RX ORDER — ALPRAZOLAM 0.5 MG/1
0.5 TABLET ORAL 2 TIMES DAILY
Status: DISCONTINUED | OUTPATIENT
Start: 2019-04-15 | End: 2019-04-16

## 2019-04-15 RX ORDER — MORPHINE SULFATE 2 MG/ML
2 INJECTION, SOLUTION INTRAMUSCULAR; INTRAVENOUS
Status: DISCONTINUED | OUTPATIENT
Start: 2019-04-15 | End: 2019-04-24 | Stop reason: HOSPADM

## 2019-04-15 RX ORDER — HYDROMORPHONE HYDROCHLORIDE 1 MG/ML
1 INJECTION, SOLUTION INTRAMUSCULAR; INTRAVENOUS; SUBCUTANEOUS
Status: DISCONTINUED | OUTPATIENT
Start: 2019-04-15 | End: 2019-04-15

## 2019-04-15 RX ADMIN — ALPRAZOLAM 0.5 MG: 0.5 TABLET ORAL at 18:07

## 2019-04-15 RX ADMIN — Medication 100 MG: at 08:12

## 2019-04-15 RX ADMIN — DIPHENHYDRAMINE HYDROCHLORIDE 25 MG: 25 CAPSULE ORAL at 19:41

## 2019-04-15 RX ADMIN — HEPARIN SODIUM 20 UNITS/KG/HR: 5000 INJECTION, SOLUTION INTRAVENOUS at 16:49

## 2019-04-15 RX ADMIN — FOLIC ACID 1 MG: 1 TABLET ORAL at 08:12

## 2019-04-15 RX ADMIN — HYDROMORPHONE HYDROCHLORIDE 1 MG: 1 INJECTION, SOLUTION INTRAMUSCULAR; INTRAVENOUS; SUBCUTANEOUS at 14:36

## 2019-04-15 RX ADMIN — HYDROMORPHONE HYDROCHLORIDE 1 MG: 1 INJECTION, SOLUTION INTRAMUSCULAR; INTRAVENOUS; SUBCUTANEOUS at 18:08

## 2019-04-15 RX ADMIN — HYDROCODONE BITARTRATE AND ACETAMINOPHEN 1 TABLET: 10; 325 TABLET ORAL at 06:15

## 2019-04-15 RX ADMIN — HYDROCODONE BITARTRATE AND ACETAMINOPHEN 1 TABLET: 10; 325 TABLET ORAL at 12:23

## 2019-04-15 RX ADMIN — LORAZEPAM 1 MG: 2 INJECTION INTRAMUSCULAR; INTRAVENOUS at 11:51

## 2019-04-15 RX ADMIN — AMLODIPINE BESYLATE 10 MG: 10 TABLET ORAL at 08:12

## 2019-04-15 RX ADMIN — HYDROMORPHONE HYDROCHLORIDE 1 MG: 1 INJECTION, SOLUTION INTRAMUSCULAR; INTRAVENOUS; SUBCUTANEOUS at 10:24

## 2019-04-15 RX ADMIN — MORPHINE SULFATE 2 MG: 2 INJECTION, SOLUTION INTRAMUSCULAR; INTRAVENOUS at 22:25

## 2019-04-15 RX ADMIN — METHYLPREDNISOLONE SODIUM SUCCINATE 60 MG: 125 INJECTION, POWDER, FOR SOLUTION INTRAMUSCULAR; INTRAVENOUS at 08:13

## 2019-04-15 RX ADMIN — METHYLPREDNISOLONE SODIUM SUCCINATE 40 MG: 125 INJECTION, POWDER, FOR SOLUTION INTRAMUSCULAR; INTRAVENOUS at 22:25

## 2019-04-15 RX ADMIN — MORPHINE SULFATE 2 MG: 2 INJECTION, SOLUTION INTRAMUSCULAR; INTRAVENOUS at 08:08

## 2019-04-15 RX ADMIN — MORPHINE SULFATE 2 MG: 2 INJECTION, SOLUTION INTRAMUSCULAR; INTRAVENOUS at 03:39

## 2019-04-15 RX ADMIN — WARFARIN SODIUM 7 MG: 5 TABLET ORAL at 18:08

## 2019-04-15 NOTE — PROGRESS NOTES
Nutrition Reason for assessment: LOS Assessment:  
Diet: DIET RENAL Regular Food/Nutrition Patient History:  PMH:polysubstance abuse, bipolar disorder. Presented with malaise and bilateral hip pain. Findings of UTI sepsis, CIRA d/t rhabdomyolysis , elevated LFT's, and non occlusive SFA stenosis. Has been started on HD but chronicity unknown at this time. Pt currently ut of room. Obtained information from visitor in room who self describes himself as her best friend. He reports she had gained 40# in 4 days and estimates her usual weight is in the 130's. He says that it depends of what she gets as to how much she eats, but she does eat something at all meals/ Sometime she picks at it and it takes her longer to eat. Anthropometrics:Height: 4' 11\" (149.9 cm),  Weight: 77.7 kg (171 lb 4.8 oz)-unknown source Edema: 1-2+ BLE and BUE Weight hx per EMR ( Based on connect care functionality, RD cannot know if these weight are actual versus stated): WT / BMI WEIGHT BODY MASS INDEX  
3/28/2019 138 lb 27.87 kg/m2 BMI 27.9 c/w overweight status Macronutrient needs: EER:  2897-2006 kcal /day (25-30 kcal/kg UBW/62.7 kg) EPR:  53-66 grams protein/day (1.2-1.5 grams/kg IBW)-catabolic CIRA Intake/Comparative Standards: Average intake for past 9 day(s)/5 recorded meal(s): 57%. This potentially meets ~% of kcal and ~100% of protein needs Nutrition Diagnosis: No nutrition diagnosis at this time Intervention: 
Meals and snacks: Continue current diet. Discharge nutrition plan: Too soon to determine. If pt remains on HD long term and needs continued renal diet restrictions then would benefit from education on diet. This can be provided by RD at outpt HD clinic. Eryn Acosta, [address], [address] [place] [address], [address] [place] [address]

## 2019-04-15 NOTE — DIALYSIS
TRANSFER IN - DIALYSIS Received patient in dialysis unit  from [facility] (unit) for ordered procedure. Consent verified for renal replacement therapy. Patient alert and vital signs stable. /97 P 109 Hemodialysis initiated using Right CVC. Aspirated and flushed both ports without difficulty. Dressing clean, dry and intact. Machine settings per MD order. Heparin 0 unit bolus and 0 units/hr. Will monitor during treatment.

## 2019-04-15 NOTE — PROGRESS NOTES
Dispo update:  Spoke briefly to Dr. Prosper Prince about Ms. Hernandez of room 205. She has rhabdomyolysis likely related to amphetamine use, now with CIRA/ATN (but ESRD not documented). Ms. Jonathan Brown does not have a payer source (\"self pay\"), but is reportedly pending [place] Medicaid. If she needs outpatient HD, will take some time to set that up. Will follow along and assist with discharge planning.

## 2019-04-15 NOTE — PROGRESS NOTES
IV heparin rate has been adjusted based on the most recent PTT results. Lab Results Component Value Date/Time  
 aPTT 153.6 (H) 04/15/2019 01:05 PM  
decrease by 2 units/kg/hr, next PTT at 2100

## 2019-04-15 NOTE — PROGRESS NOTES
IV heparin rate has been adjusted based on the most recent PTT results. Lab Results Component Value Date/Time  
 aPTT 124.6 (H) 04/15/2019 06:10 AM  
decrease by 2 units/kg/hr. Next PTT @ 1315.

## 2019-04-15 NOTE — PROGRESS NOTES
END OF SHIFT NOTE: 
 
INTAKE/OUTPUT 
04/14 0701 - 04/15 0700 In: 1352.9 [P.O.:720] Out: 775 [Urine:775] Voiding: NO 
Catheter: YES Drain:   
 
 
 
 
 
Flatus: Patient does have flatus present. Stool:  0 occurrences. Characteristics: 
Stool Assessment Stool Color: [facility] Stool Appearance: Loose Stool Amount: Medium Stool Source/Status: Rectum Emesis: 0 occurrences. Characteristics: VITAL SIGNS Patient Vitals for the past 12 hrs: 
 Temp Pulse Resp BP SpO2  
04/15/19 0333 97.5 °F (36.4 °C) (!) 107 17 121/82 92 % 04/15/19 0003 98.5 °F (36.9 °C) (!) 116 19 125/78 94 % 04/14/19 1947 97.4 °F (36.3 °C) (!) 108 19 131/85 97 % Pain Assessment Pain Intensity 1: 5 (04/15/19 0021) Pain Location 1: Leg 
Pain Intervention(s) 1: Medication (see MAR) Patient Stated Pain Goal: 2 Ambulating Yes Shift report given to oncoming nurse at the bedside.  
 
Eitan Solorio RN

## 2019-04-15 NOTE — PROGRESS NOTES
Warfarin dosing per pharmacist 
 
Wendi Barr is a 33 y.o. female. Height: 4' 11\" (149.9 cm)    Weight: 77.7 kg (171 lb 4.8 oz) Indication:  thrombosis R peroneal vein Goal INR:  2-3 Home dose:  New start Risk factors or significant drug interactions:  none Other anticoagulants:  Heparin bridge Daily Monitoring Date  INR     Warfarin dose HGB              Notes 4/8  1.0  ---  12.3 
-----  
4/12  ---  5 mg  9.7 4/13  1  5 mg  --- 
4/14  1.2  7 mg  8.3 4/15  1.3  7 mg  8.0 Pharmacy consulted to dose and monitor warfarin for Ms. Hernandez with a R peroneal vein thrombosis. Continue heparin drip until INR is therapeutic X48h. INR trending up. Continue 7 mg this evening. Following daily INR. Thank you, Brandon Anderson, Pharm. D. Clinical Pharmacist 
[phone]

## 2019-04-15 NOTE — PROGRESS NOTES
Hospitalist Progress Note 4/15/2019 Admit Date: 2019  4:59 PM  
NAME: Thanh Hernandez :  1990 DOS:              04/15/19 MRN:  [id number] Attending: Saroj Hdez MD 
PCP:  None Treatment Team: Attending Provider: Sharmila Osei MD; Consulting Provider: Cecily Conroy MD; Care Manager: Pat Argueta RN; Utilization Review: Cherise Ambrosio RN; Consulting Provider: Rafia Yan MD 
 
Full Code SUBJECTIVE: As previously documented: 34 yo female with PMH of polysubstance use who is evaluated with malaise and bilateral hip pain. She has UDS showing amphetamines, benzo, opiates. In the ED patient was found to have UA (+) for UTI, CIRA ,rhabdo and elevated LFTs. CT brain and CXR with no acute disease. Patient also was found to have a R peroneal vein thrombosis on US, started on heparin drip. Vascular evaluated patient for suspected stenosis of L external iliac artery with no acute intervention recommended. Nephrology following due to CIRA, no renal recovery so far requiring HD. Rheumatologist recommended trial of steroids taper for suspected myositis. 04/15/19    Lena Hernandez stated morphine is not helping with pain on her muscles. 10+ ROS reviewed and negative except for positive in HPI. Allergies Allergen Reactions  Dilantin [Phenytoin Sodium Extended] Itching  Keppra [Levetiracetam] Rash Current Facility-Administered Medications Medication Dose Route Frequency  HYDROmorphone (PF) (DILAUDID) injection 1 mg  1 mg IntraVENous Q4H PRN  
 warfarin (COUMADIN) tablet 7 mg  7 mg Oral QPM  
 methylPREDNISolone (PF) (SOLU-MEDROL) injection 60 mg  60 mg IntraVENous Q12H  
 amLODIPine (NORVASC) tablet 10 mg  10 mg Oral DAILY  hydrALAZINE (APRESOLINE) 20 mg/mL injection 10 mg  10 mg IntraVENous Q6H PRN  
 diphenhydrAMINE (BENADRYL) capsule 25 mg  25 mg Oral DIALYSIS PRN  
  HYDROcodone-acetaminophen (NORCO)  mg tablet 1 Tab  1 Tab Oral Q6H PRN  
 diphenhydrAMINE (BENADRYL) capsule 25 mg  25 mg Oral Q6H PRN  thiamine HCL (B-1) tablet 100 mg  100 mg Oral DAILY  folic acid (FOLVITE) tablet 1 mg  1 mg Oral DAILY  heparin 25,000 units in dextrose 500 mL infusion  18-36 Units/kg/hr IntraVENous TITRATE  sodium chloride (NS) flush 5-10 mL  5-10 mL IntraVENous PRN  
 ondansetron (ZOFRAN) injection 4 mg  4 mg IntraVENous Q6H PRN  
 nicotine (NICODERM CQ) 21 mg/24 hr patch 1 Patch  1 Patch TransDERmal DAILY  LORazepam (ATIVAN) injection 1 mg  1 mg IntraVENous Q6H PRN  
 naloxone (NARCAN) injection 0.4 mg  0.4 mg IntraVENous EVERY 2 MINUTES AS NEEDED There is no immunization history on file for this patient. Objective:  
 
Patient Vitals for the past 24 hrs: 
 Temp Pulse Resp BP SpO2  
04/15/19 0744 97.7 °F (36.5 °C) 98 18 127/90 95 % 04/15/19 0333 97.5 °F (36.4 °C) (!) 107 17 121/82 92 % 04/15/19 0003 98.5 °F (36.9 °C) (!) 116 19 125/78 94 % 19 1947 97.4 °F (36.3 °C) (!) 108 19 131/85 97 % 19 1529 98 °F (36.7 °C) (!) 107 19 128/81 96 % 19 1128 98.1 °F (36.7 °C) (!) 105 18 130/82 94 % Temp (24hrs), Av.9 °F (36.6 °C), Min:97.4 °F (36.3 °C), Max:98.5 °F (36.9 °C) Oxygen Therapy O2 Sat (%): 95 % (04/15/19 0744) Pulse via Oximetry: 127 beats per minute (19) O2 Device: Room air (19 1100) Oxygen Therapy O2 Sat (%): 95 % (04/15/19 0744) Pulse via Oximetry: 127 beats per minute (199) O2 Device: Room air (19 1100) Physical Exam: 
General:         Alert, cooperative, no distress HEENT:               NCAT. No obvious deformity. Lungs: No wheezing/rhonchi/rales Cardiovascular:   RRR. No m/r/g. Abdomen:       S/nt/nd. Bowel sounds normal. .  
Skin:         Diffused generalized pain Neurologic:    No gross focal deficit. Psychiatric:         Good mood. Normal affect. Extremities:         Some diffuse tenderness better today b/l LE. Trace pedal edema. (+) pedal pulses DIAGNOSTIC STUDIES Data Review:  
Recent Results (from the past 24 hour(s)) CK Collection Time: 04/15/19  6:10 AM  
Result Value Ref Range  (H) 21 - 215 U/L  
PROTHROMBIN TIME + INR Collection Time: 04/15/19  6:10 AM  
Result Value Ref Range Prothrombin time 15.8 (H) 11.7 - 14.5 sec INR 1.3 METABOLIC PANEL, COMPREHENSIVE Collection Time: 04/15/19  6:10 AM  
Result Value Ref Range Sodium 134 (L) 136 - 145 mmol/L Potassium 4.9 3.5 - 5.1 mmol/L Chloride 99 98 - 107 mmol/L  
 CO2 26 21 - 32 mmol/L Anion gap 9 7 - 16 mmol/L Glucose 149 (H) 65 - 100 mg/dL BUN 49 (H) 6 - 23 MG/DL Creatinine 7.91 (H) 0.6 - 1.0 MG/DL  
 GFR est AA 8 (L) >60 ml/min/1.73m2 GFR est non-AA 6 (L) >60 ml/min/1.73m2 Calcium 8.5 8.3 - 10.4 MG/DL Bilirubin, total 0.3 0.2 - 1.1 MG/DL  
 ALT (SGPT) 114 (H) 12 - 65 U/L  
 AST (SGOT) 80 (H) 15 - 37 U/L Alk. phosphatase 33 (L) 50 - 136 U/L Protein, total 5.0 (L) 6.3 - 8.2 g/dL Albumin 2.0 (L) 3.5 - 5.0 g/dL Globulin 3.0 2.3 - 3.5 g/dL A-G Ratio 0.7 (L) 1.2 - 3.5 FERRITIN Collection Time: 04/15/19  6:10 AM  
Result Value Ref Range Ferritin 197 8 - 388 NG/ML  
IRON Collection Time: 04/15/19  6:10 AM  
Result Value Ref Range Iron 87 35 - 150 ug/dL CBC WITH AUTOMATED DIFF Collection Time: 04/15/19  6:10 AM  
Result Value Ref Range WBC 20.4 (H) 4.3 - 11.1 K/uL  
 RBC 2.55 (L) 4.05 - 5.2 M/uL HGB 8.0 (L) 11.7 - 15.4 g/dL HCT 23.2 (L) 35.8 - 46.3 % MCV 91.0 79.6 - 97.8 FL  
 MCH 31.4 26.1 - 32.9 PG  
 MCHC 34.5 31.4 - 35.0 g/dL  
 RDW 15.4 (H) 11.9 - 14.6 % PLATELET 992 336 - 973 K/uL MPV 10.9 9.4 - 12.3 FL ABSOLUTE NRBC 0.00 0.0 - 0.2 K/uL  
 DF AUTOMATED NEUTROPHILS 88 (H) 43 - 78 % LYMPHOCYTES 8 (L) 13 - 44 % MONOCYTES 2 (L) 4.0 - 12.0 % EOSINOPHILS 0 (L) 0.5 - 7.8 % BASOPHILS 0 0.0 - 2.0 % IMMATURE GRANULOCYTES 2 0.0 - 5.0 %  
 ABS. NEUTROPHILS 17.9 (H) 1.7 - 8.2 K/UL  
 ABS. LYMPHOCYTES 1.6 0.5 - 4.6 K/UL  
 ABS. MONOCYTES 0.5 0.1 - 1.3 K/UL  
 ABS. EOSINOPHILS 0.0 0.0 - 0.8 K/UL  
 ABS. BASOPHILS 0.0 0.0 - 0.2 K/UL  
 ABS. IMM. GRANS. 0.4 0.0 - 0.5 K/UL  
PTT Collection Time: 04/15/19  6:10 AM  
Result Value Ref Range aPTT 124.6 (H) 24.7 - 39.8 SEC All Micro Results Procedure Component Value Units Date/Time CULTURE, BLOOD [[id number]] Collected:  19 Order Status:  Completed Specimen:  Blood Updated:  19 3233 Special Requests: --     
  LEFT 
ARM Culture result: NO GROWTH 5 DAYS     
 CULTURE, BLOOD [[id number]] Collected:  19 Order Status:  Completed Specimen:  Blood Updated:  19 7681 Special Requests: --     
  LEFT 
HAND Culture result: NO GROWTH 5 DAYS     
 CULTURE, URINE [[id number]] Collected:  19 1857 Order Status:  Completed Specimen:  Cath Urine Updated:  19 9359 Special Requests: NO SPECIAL REQUESTS Culture result: NO GROWTH 2 DAYS     
 CULTURE, URINE [[id number]] Collected:  19 Order Status:  Canceled Specimen:  Urine from Clean catch CULTURE, BLOOD [[id number]] Collected:  19 Order Status:  Canceled Specimen:  Blood Imaging Hien Foreman /Studies: CXR Results  (Last 48 hours) None CT Results  (Last 48 hours) None No results found. Results for orders placed or performed during the hospital encounter of 19  
2D ECHO COMPLETE ADULT (TTE) W OR WO CONTR Narrative [facility] [address] [place], [address] [facility] 
[phone] Transthoracic Echocardiogram 
2D, M-mode, Doppler, and Color Doppler Patient: Michele Rodriges MR #: [id number] : 1990 Age: 33 years Gender: Female Study date: 08-Apr-2019 Account #: [de-identified] Height: 59 in 
Weight: 124.7 lb 
BSA: 1.51 mï¾² Status:Routine Location: 205 BP: 120/ 78 Allergies: PHENYTOIN SODIUM EXTENDED, LEVETIRACETAM 
 
Sonographer:  Senora Bamberger, RDCS Group:  [id number] Cardiology Referring Physician:  Sean Morrison. Brice Steward MD 
Reading Physician:  Anna Gunn. Teresita Webster MD [facility] INDICATIONS: PVD. *Per patient- unable to turn onto left side due to blood clot in leg. * PROCEDURE: This was a routine study. A transthoracic echocardiogram was 
performed. The study included complete 2D imaging, M-mode, complete spectral 
Doppler, and color Doppler. Echocardiographic views were limited by  
restricted 
patient mobility and poor acoustic window availability. Image quality was 
adequate. LEFT VENTRICLE: Size was normal. Systolic function was normal. Ejection 
fraction was estimated in the range of 55 % to 60 %. There were no regional 
wall motion abnormalities. Wall thickness was normal. The E/e' ratio was 6.2. Left ventricular diastolic function parameters were normal. 
 
RIGHT VENTRICLE: The size was normal. Systolic function was normal. The 
tricuspid jet envelope definition was inadequate for estimation of RV  
systolic 
pressure. LEFT ATRIUM: Size was normal. 
 
RIGHT ATRIUM: Size was normal. 
 
SYSTEMIC VEINS: IVC: The inferior vena cava was normal in size and course. AORTIC VALVE: The valve was structurally normal, tri-commissural. There was  
no 
evidence for stenosis. There was no insufficiency. MITRAL VALVE: Valve structure was normal. There was no evidence for stenosis. There was trivial regurgitation. TRICUSPID VALVE: The valve structure was normal. There was no evidence for 
stenosis. There was no regurgitation. PULMONIC VALVE: Not well visualized. There was no evidence for stenosis. There 
was trivial regurgitation. PERICARDIUM: There was no pericardial effusion. AORTA: The root exhibited normal size. SUMMARY: 
 
-  Left ventricle: Systolic function was normal. Ejection fraction was 
estimated in the range of 55 % to 60 %. There were no regional wall motion 
abnormalities. SYSTEM MEASUREMENT TABLES 
 
2D mode AoR Diam (2D): 2.5 cm 
LA Dimension (2D): 3 cm Left Atrium Systolic Volume Index; Method of Disks, Biplane; 2D mode;: 15.2 
ml/m2 IVS/LVPW (2D): 1.1 IVSd (2D): 1 cm LVIDd (2D): 3.8 cm LVIDs (2D): 2.5 cm 
LVOT Area (2D): 3.1 cm2 LVPWd (2D): 0.9 cm Tissue Doppler Imaging LV Peak Early Charles Tissue Win; Lateral MA (TDI): 17.2 cm/s LV Peak Early Charles Tissue Win; Medial MA (TDI): 8.2 cm/s Unspecified Scan Mode Peak Grad; Mean; Antegrade Flow: 8 mm[Hg] Vmax; Antegrade Flow: 134 cm/s LVOT Diam: 2 cm 
MV Peak Win/LV Peak Tissue Win E-Wave; Lateral MA: 4 MV Peak Win/LV Peak Tissue Win E-Wave; Medial MA: 8.4 Prepared and signed by 
 
Radha Brown. Lore Ramirez MD [facility] Signed 08-Apr-2019 12:22:39 Labs and Studies from previous 24 hours have been personally reviewed by myself   
ASSESSMENT Active Hospital Problems Diagnosis Date Noted  Encephalopathy, toxic 04/08/2019  Toxic metabolic encephalopathy 86/82/6158  Rhabdomyolysis 04/07/2019  Sepsis ([facility] 04/06/2019  Hepatitis 04/06/2019  Substance use disorder 04/06/2019  Hip pain 04/06/2019  Hyponatremia 04/06/2019  [facility] 04/06/2019  UTI (urinary tract infection) 04/06/2019  Hypertension 04/06/2019  Asthma 05/19/2016 Last Assessment & Plan:  
Albuterol prn  Borderline personality disorder ([facility] 05/19/2016 Last Assessment & Plan:  
Continue Prozac, Seroquel, and trazodone Hospital Problems as of 4/15/2019 Date Reviewed: 4/7/2019 Codes Class Noted - Resolved POA Encephalopathy, toxic ICD-10-CM: G92 ICD-9-CM: 349.82  4/8/2019 - Present Unknown  Toxic metabolic encephalopathy [id number]: R00 
 ICD-9-CM: 349.82  4/8/2019 - Present Unknown Rhabdomyolysis ICD-10-CM: C89.15 ICD-9-CM: 728.88  4/7/2019 - Present Yes * (Principal) Sepsis ([facility] ICD-10-CM: A41.9 ICD-9-CM: 038.9, 995.91  4/6/2019 - Present Yes Hepatitis ICD-10-CM: K75.9 ICD-9-CM: 573.3  4/6/2019 - Present Yes Substance use disorder (Chronic) ICD-10-CM: F19.90 ICD-9-CM: 305.90  4/6/2019 - Present Yes Hip pain ICD-10-CM: M25.559 ICD-9-CM: 719.45  4/6/2019 - Present Yes Hyponatremia ICD-10-CM: E87.1 ICD-9-CM: 276.1  4/6/2019 - Present Yes CIRA (acute kidney injury) ([facility] ICD-10-CM: N17.9 ICD-9-CM: 584.9  4/6/2019 - Present Yes  
   
 UTI (urinary tract infection) ICD-10-CM: N39.0 ICD-9-CM: 599.0  4/6/2019 - Present Yes Hypertension ICD-10-CM: I10 
ICD-9-CM: 401.9  4/6/2019 - Present Yes Asthma (Chronic) ICD-10-CM: J45.909 ICD-9-CM: 493.90  5/19/2016 - Present Yes Overview Signed 4/7/2019  7:19 AM by GARRET Bui Last Assessment & Plan:  
Albuterol prn Borderline personality disorder ([facility] (Chronic) ICD-10-CM: F60.3 ICD-9-CM: 301.83  5/19/2016 - Present Yes Overview Signed 4/7/2019  7:19 AM by GARRET Bui Last Assessment & Plan:  
Continue Prozac, Seroquel, and trazodone A/P: 
 
 
-CIRA Likely secondary to rhabdo No signs of renal recovery yet Nephrology following Discussed with nephrologist  Dr. Cyndee Lincoln over the phone. He will try to remove some extra fluids to improve LE swelling.  
 
-Rhabdo CPK trending down Cont to trend 
 
-Rt peroneal vein dvt 
?due to trauma INR: 1.3 Cont heparin drip/warfarin bridging 
 
-RLE tenderness and swelling ? myositis due to amphetamine vs edema from CIRA Repeated US no DVT above the knee Leucocytosis today likely secondary steroids induced Rheumatology eval appreciated 04/14 Cont IV  Steroid,  Taper to 40 mg today Pain medications adjusted Extra fluids will be removed with HD. Hopefully that will improve the pain  
 
-Elevated LFTs Likely due to rhabdo Trending down Viral hepatitis panel and HIV neg Cont to trend -HTN Stable Cont  amlodipine 10 mg and  hydralazine PRN 
 
-Sepsis secondary to ? UTI Blood and urine cultures NGTD ID evaluation appreciated. Findings likely secondary to rhabdo Abxs stopped 04/10 
 
- Toxic encephalopathy Likely secondary to amphetamines Resolved 
 
-Anemia 
?due to Hemodilution and phlebotomy Hb stable around 8 Iron levels stable, TIBC pending Will continue to monitor hb Get stools occult blood DVT Prophylaxis: heparin CODE Status: Full Plan of Care Discussed with: patient. Care team. 
 
Addendum: 
 
Patient requested Janis Mena was updated about patient's medical condition. Ms. Janis Mena was informed at [phone] Víctor Prince MD 
04/15/19

## 2019-04-15 NOTE — PROGRESS NOTES
Renal Progress Note Admission Date: 4/6/2019 Subjective: The patient was seen on dialysis at 1:56 PM .  BP is stable. Catheter is functioning well. Complains of pain all over Objective:  
 
Physical Exam:   
Patient Vitals for the past 8 hrs: 
 BP Temp Pulse Resp SpO2  
04/15/19 1340 (!) 149/92  (!) 113    
04/15/19 1254 149/87  (!) 101    
04/15/19 1206 136/88  97    
04/15/19 1120 (!) 145/97  (!) 109    
04/15/19 0744 127/90 97.7 °F (36.5 °C) 98 18 95 % Gen: comfortable , NAD 
CV: S1, S2 
Lungs: Clear Extem: 1+ edema Current Facility-Administered Medications Medication Dose Route Frequency  HYDROmorphone (PF) (DILAUDID) injection 1 mg  1 mg IntraVENous Q4H PRN  
 methylPREDNISolone (PF) (Solu-MEDROL) injection 40 mg  40 mg IntraVENous Q12H  warfarin (COUMADIN) tablet 7 mg  7 mg Oral QPM  
 amLODIPine (NORVASC) tablet 10 mg  10 mg Oral DAILY  hydrALAZINE (APRESOLINE) 20 mg/mL injection 10 mg  10 mg IntraVENous Q6H PRN  
 diphenhydrAMINE (BENADRYL) capsule 25 mg  25 mg Oral DIALYSIS PRN  
 HYDROcodone-acetaminophen (NORCO)  mg tablet 1 Tab  1 Tab Oral Q6H PRN  
 diphenhydrAMINE (BENADRYL) capsule 25 mg  25 mg Oral Q6H PRN  thiamine HCL (B-1) tablet 100 mg  100 mg Oral DAILY  folic acid (FOLVITE) tablet 1 mg  1 mg Oral DAILY  heparin 25,000 units in dextrose 500 mL infusion  18-36 Units/kg/hr IntraVENous TITRATE  sodium chloride (NS) flush 5-10 mL  5-10 mL IntraVENous PRN  
 ondansetron (ZOFRAN) injection 4 mg  4 mg IntraVENous Q6H PRN  
 nicotine (NICODERM CQ) 21 mg/24 hr patch 1 Patch  1 Patch TransDERmal DAILY  LORazepam (ATIVAN) injection 1 mg  1 mg IntraVENous Q6H PRN  
 naloxone (NARCAN) injection 0.4 mg  0.4 mg IntraVENous EVERY 2 MINUTES AS NEEDED Data Review:  
 
LABS:  
Recent Results (from the past 12 hour(s)) CK Collection Time: 04/15/19  6:10 AM  
Result Value Ref Range   (H) 21 - 215 U/L  
 PROTHROMBIN TIME + INR Collection Time: 04/15/19  6:10 AM  
Result Value Ref Range Prothrombin time 15.8 (H) 11.7 - 14.5 sec INR 1.3 METABOLIC PANEL, COMPREHENSIVE Collection Time: 04/15/19  6:10 AM  
Result Value Ref Range Sodium 134 (L) 136 - 145 mmol/L Potassium 4.9 3.5 - 5.1 mmol/L Chloride 99 98 - 107 mmol/L  
 CO2 26 21 - 32 mmol/L Anion gap 9 7 - 16 mmol/L Glucose 149 (H) 65 - 100 mg/dL BUN 49 (H) 6 - 23 MG/DL Creatinine 7.91 (H) 0.6 - 1.0 MG/DL  
 GFR est AA 8 (L) >60 ml/min/1.73m2 GFR est non-AA 6 (L) >60 ml/min/1.73m2 Calcium 8.5 8.3 - 10.4 MG/DL Bilirubin, total 0.3 0.2 - 1.1 MG/DL  
 ALT (SGPT) 114 (H) 12 - 65 U/L  
 AST (SGOT) 80 (H) 15 - 37 U/L Alk. phosphatase 33 (L) 50 - 136 U/L Protein, total 5.0 (L) 6.3 - 8.2 g/dL Albumin 2.0 (L) 3.5 - 5.0 g/dL Globulin 3.0 2.3 - 3.5 g/dL A-G Ratio 0.7 (L) 1.2 - 3.5 FERRITIN Collection Time: 04/15/19  6:10 AM  
Result Value Ref Range Ferritin 197 8 - 388 NG/ML  
IRON Collection Time: 04/15/19  6:10 AM  
Result Value Ref Range Iron 87 35 - 150 ug/dL TRANSFERRIN SATURATION Collection Time: 04/15/19  6:10 AM  
Result Value Ref Range Iron 87 35 - 150 ug/dL TIBC 201 (L) 250 - 450 ug/dL Transferrin Saturation 43 >20 % CBC WITH AUTOMATED DIFF Collection Time: 04/15/19  6:10 AM  
Result Value Ref Range WBC 20.4 (H) 4.3 - 11.1 K/uL  
 RBC 2.55 (L) 4.05 - 5.2 M/uL HGB 8.0 (L) 11.7 - 15.4 g/dL HCT 23.2 (L) 35.8 - 46.3 % MCV 91.0 79.6 - 97.8 FL  
 MCH 31.4 26.1 - 32.9 PG  
 MCHC 34.5 31.4 - 35.0 g/dL  
 RDW 15.4 (H) 11.9 - 14.6 % PLATELET 087 909 - 647 K/uL MPV 10.9 9.4 - 12.3 FL ABSOLUTE NRBC 0.00 0.0 - 0.2 K/uL  
 DF AUTOMATED NEUTROPHILS 88 (H) 43 - 78 % LYMPHOCYTES 8 (L) 13 - 44 % MONOCYTES 2 (L) 4.0 - 12.0 % EOSINOPHILS 0 (L) 0.5 - 7.8 % BASOPHILS 0 0.0 - 2.0 % IMMATURE GRANULOCYTES 2 0.0 - 5.0 % ABS. NEUTROPHILS 17.9 (H) 1.7 - 8.2 K/UL  
 ABS. LYMPHOCYTES 1.6 0.5 - 4.6 K/UL  
 ABS. MONOCYTES 0.5 0.1 - 1.3 K/UL  
 ABS. EOSINOPHILS 0.0 0.0 - 0.8 K/UL  
 ABS. BASOPHILS 0.0 0.0 - 0.2 K/UL  
 ABS. IMM. GRANS. 0.4 0.0 - 0.5 K/UL  
PTT Collection Time: 04/15/19  6:10 AM  
Result Value Ref Range aPTT 124.6 (H) 24.7 - 39.8 SEC  
PTT Collection Time: 04/15/19  1:05 PM  
Result Value Ref Range aPTT 153.6 (H) 24.7 - 39.8 SEC Plan:  
 
Principal Problem: 
  Sepsis ([facility] (4/6/2019) Active Problems: 
  Hepatitis (4/6/2019) Substance use disorder (4/6/2019) Hip pain (4/6/2019) Hyponatremia (4/6/2019) CIRA (acute kidney injury) ([facility] (4/6/2019) UTI (urinary tract infection) (4/6/2019) Hypertension (4/6/2019) Rhabdomyolysis (4/7/2019) Asthma (5/19/2016) Overview: Last Assessment & Plan:  
    Albuterol prn Borderline personality disorder ([facility] (5/19/2016) Overview: Last Assessment & Plan:  
    Continue Prozac, Seroquel, and trazodone Encephalopathy, toxic (4/8/2019) Toxic metabolic encephalopathy (6/4/0858) This is a 32 yo WF with rhabdomyolysis likely related to amphetamine use. ATN- no sign of renal recovery yet Still oliguric Electrolytes stable

## 2019-04-15 NOTE — DIALYSIS
TRANSFER OUT- DIALYSIS Hemodialysis treatment completed without complications. Patient alert and VS stable  /97  P 117   
 
 3.0 Kgs removed. Flushed both ports with 10 mL of NS.  CVC dressing clean, dry, and intact, tego caps intact, bilateral lumens wrapped with 4x4 gauze. Patient to 205 after dialysis.

## 2019-04-16 ENCOUNTER — APPOINTMENT (OUTPATIENT)
Dept: GENERAL RADIOLOGY | Age: 29
DRG: 871 | End: 2019-04-16
Attending: FAMILY MEDICINE
Payer: SELF-PAY

## 2019-04-16 LAB
ALBUMIN SERPL-MCNC: 2.2 G/DL (ref 3.5–5)
ALBUMIN/GLOB SERPL: 0.8 {RATIO} (ref 1.2–3.5)
ALP SERPL-CCNC: 30 U/L (ref 50–136)
ALT SERPL-CCNC: 98 U/L (ref 12–65)
AMPHET UR QL SCN: POSITIVE
ANION GAP SERPL CALC-SCNC: 6 MMOL/L (ref 7–16)
APTT PPP: 154.9 SEC (ref 24.7–39.8)
APTT PPP: 81.3 SEC (ref 24.7–39.8)
AST SERPL-CCNC: 55 U/L (ref 15–37)
BARBITURATES UR QL SCN: NEGATIVE
BASOPHILS # BLD: 0 K/UL (ref 0–0.2)
BASOPHILS NFR BLD: 0 % (ref 0–2)
BENZODIAZ UR QL: POSITIVE
BILIRUB SERPL-MCNC: 0.3 MG/DL (ref 0.2–1.1)
BUN SERPL-MCNC: 34 MG/DL (ref 6–23)
CALCIUM SERPL-MCNC: 8.5 MG/DL (ref 8.3–10.4)
CANNABINOIDS UR QL SCN: NEGATIVE
CHLORIDE SERPL-SCNC: 102 MMOL/L (ref 98–107)
CK SERPL-CCNC: 553 U/L (ref 21–215)
CO2 SERPL-SCNC: 29 MMOL/L (ref 21–32)
COCAINE UR QL SCN: NEGATIVE
CREAT SERPL-MCNC: 5.08 MG/DL (ref 0.6–1)
DIFFERENTIAL METHOD BLD: ABNORMAL
EOSINOPHIL # BLD: 0 K/UL (ref 0–0.8)
EOSINOPHIL NFR BLD: 0 % (ref 0.5–7.8)
ERYTHROCYTE [DISTWIDTH] IN BLOOD BY AUTOMATED COUNT: 15.5 % (ref 11.9–14.6)
GLOBULIN SER CALC-MCNC: 2.9 G/DL (ref 2.3–3.5)
GLUCOSE SERPL-MCNC: 143 MG/DL (ref 65–100)
HCT VFR BLD AUTO: 20.6 % (ref 35.8–46.3)
HGB BLD-MCNC: 7 G/DL (ref 11.7–15.4)
IMM GRANULOCYTES # BLD AUTO: 0.9 K/UL (ref 0–0.5)
IMM GRANULOCYTES NFR BLD AUTO: 4 % (ref 0–5)
INR PPP: 2.1
LYMPHOCYTES # BLD: 1.7 K/UL (ref 0.5–4.6)
LYMPHOCYTES NFR BLD: 8 % (ref 13–44)
MCH RBC QN AUTO: 31.3 PG (ref 26.1–32.9)
MCHC RBC AUTO-ENTMCNC: 34 G/DL (ref 31.4–35)
MCV RBC AUTO: 92 FL (ref 79.6–97.8)
METHADONE UR QL: NEGATIVE
MONOCYTES # BLD: 1.3 K/UL (ref 0.1–1.3)
MONOCYTES NFR BLD: 6 % (ref 4–12)
NEUTS SEG # BLD: 17.8 K/UL (ref 1.7–8.2)
NEUTS SEG NFR BLD: 82 % (ref 43–78)
NRBC # BLD: 0 K/UL (ref 0–0.2)
OPIATES UR QL: POSITIVE
PCP UR QL: NEGATIVE
PLATELET # BLD AUTO: 316 K/UL (ref 150–450)
PMV BLD AUTO: 10.4 FL (ref 9.4–12.3)
POTASSIUM SERPL-SCNC: 4.7 MMOL/L (ref 3.5–5.1)
PROT SERPL-MCNC: 5.1 G/DL (ref 6.3–8.2)
PROTHROMBIN TIME: 23.1 SEC (ref 11.7–14.5)
RBC # BLD AUTO: 2.24 M/UL (ref 4.05–5.2)
SODIUM SERPL-SCNC: 137 MMOL/L (ref 136–145)
WBC # BLD AUTO: 21.7 K/UL (ref 4.3–11.1)

## 2019-04-16 PROCEDURE — 86900 BLOOD TYPING SEROLOGIC ABO: CPT

## 2019-04-16 PROCEDURE — 36415 COLL VENOUS BLD VENIPUNCTURE: CPT

## 2019-04-16 PROCEDURE — 82550 ASSAY OF CK (CPK): CPT

## 2019-04-16 PROCEDURE — 30233N1 TRANSFUSION OF NONAUTOLOGOUS RED BLOOD CELLS INTO PERIPHERAL VEIN, PERCUTANEOUS APPROACH: ICD-10-PCS | Performed by: INTERNAL MEDICINE

## 2019-04-16 PROCEDURE — 74011250637 HC RX REV CODE- 250/637: Performed by: INTERNAL MEDICINE

## 2019-04-16 PROCEDURE — 85025 COMPLETE CBC W/AUTO DIFF WBC: CPT

## 2019-04-16 PROCEDURE — 65660000000 HC RM CCU STEPDOWN

## 2019-04-16 PROCEDURE — 85610 PROTHROMBIN TIME: CPT

## 2019-04-16 PROCEDURE — 80053 COMPREHEN METABOLIC PANEL: CPT

## 2019-04-16 PROCEDURE — 74011250637 HC RX REV CODE- 250/637: Performed by: FAMILY MEDICINE

## 2019-04-16 PROCEDURE — 74011250636 HC RX REV CODE- 250/636: Performed by: INTERNAL MEDICINE

## 2019-04-16 PROCEDURE — 85730 THROMBOPLASTIN TIME PARTIAL: CPT

## 2019-04-16 PROCEDURE — 86923 COMPATIBILITY TEST ELECTRIC: CPT

## 2019-04-16 PROCEDURE — 36430 TRANSFUSION BLD/BLD COMPNT: CPT

## 2019-04-16 PROCEDURE — 73502 X-RAY EXAM HIP UNI 2-3 VIEWS: CPT

## 2019-04-16 PROCEDURE — 74011250636 HC RX REV CODE- 250/636: Performed by: FAMILY MEDICINE

## 2019-04-16 PROCEDURE — 65270000029 HC RM PRIVATE

## 2019-04-16 PROCEDURE — 77030039270 HC TU BLD FLTR CARD -A

## 2019-04-16 PROCEDURE — P9016 RBC LEUKOCYTES REDUCED: HCPCS

## 2019-04-16 RX ORDER — ALPRAZOLAM 0.5 MG/1
1 TABLET ORAL 2 TIMES DAILY
Status: DISCONTINUED | OUTPATIENT
Start: 2019-04-16 | End: 2019-04-24 | Stop reason: HOSPADM

## 2019-04-16 RX ORDER — SODIUM CHLORIDE 9 MG/ML
250 INJECTION, SOLUTION INTRAVENOUS AS NEEDED
Status: DISCONTINUED | OUTPATIENT
Start: 2019-04-16 | End: 2019-04-24 | Stop reason: HOSPADM

## 2019-04-16 RX ORDER — WARFARIN SODIUM 5 MG/1
5 TABLET ORAL EVERY EVENING
Status: DISCONTINUED | OUTPATIENT
Start: 2019-04-16 | End: 2019-04-19

## 2019-04-16 RX ADMIN — FOLIC ACID 1 MG: 1 TABLET ORAL at 09:18

## 2019-04-16 RX ADMIN — MORPHINE SULFATE 2 MG: 2 INJECTION, SOLUTION INTRAMUSCULAR; INTRAVENOUS at 14:50

## 2019-04-16 RX ADMIN — HYDROCODONE BITARTRATE AND ACETAMINOPHEN 1 TABLET: 10; 325 TABLET ORAL at 00:54

## 2019-04-16 RX ADMIN — Medication 100 MG: at 09:18

## 2019-04-16 RX ADMIN — HYDROCODONE BITARTRATE AND ACETAMINOPHEN 1 TABLET: 10; 325 TABLET ORAL at 13:42

## 2019-04-16 RX ADMIN — MORPHINE SULFATE 2 MG: 2 INJECTION, SOLUTION INTRAMUSCULAR; INTRAVENOUS at 22:46

## 2019-04-16 RX ADMIN — HYDROCODONE BITARTRATE AND ACETAMINOPHEN 1 TABLET: 10; 325 TABLET ORAL at 19:34

## 2019-04-16 RX ADMIN — ALPRAZOLAM 1 MG: 0.5 TABLET ORAL at 09:18

## 2019-04-16 RX ADMIN — METHYLPREDNISOLONE SODIUM SUCCINATE 40 MG: 125 INJECTION, POWDER, FOR SOLUTION INTRAMUSCULAR; INTRAVENOUS at 09:19

## 2019-04-16 RX ADMIN — Medication 10 ML: at 22:47

## 2019-04-16 RX ADMIN — MORPHINE SULFATE 2 MG: 2 INJECTION, SOLUTION INTRAMUSCULAR; INTRAVENOUS at 10:59

## 2019-04-16 RX ADMIN — MORPHINE SULFATE 2 MG: 2 INJECTION, SOLUTION INTRAMUSCULAR; INTRAVENOUS at 02:50

## 2019-04-16 RX ADMIN — AMLODIPINE BESYLATE 10 MG: 10 TABLET ORAL at 09:18

## 2019-04-16 RX ADMIN — METHYLPREDNISOLONE SODIUM SUCCINATE 30 MG: 40 INJECTION, POWDER, FOR SOLUTION INTRAMUSCULAR; INTRAVENOUS at 21:04

## 2019-04-16 RX ADMIN — MORPHINE SULFATE 2 MG: 2 INJECTION, SOLUTION INTRAMUSCULAR; INTRAVENOUS at 18:42

## 2019-04-16 RX ADMIN — MORPHINE SULFATE 2 MG: 2 INJECTION, SOLUTION INTRAMUSCULAR; INTRAVENOUS at 07:08

## 2019-04-16 RX ADMIN — WARFARIN SODIUM 5 MG: 5 TABLET ORAL at 16:23

## 2019-04-16 RX ADMIN — ALPRAZOLAM 1 MG: 0.5 TABLET ORAL at 16:23

## 2019-04-16 RX ADMIN — Medication 30 ML: at 23:01

## 2019-04-16 NOTE — PROGRESS NOTES
[facility] Subjective: CIRA  No new complaints Review of Systems -  
General ROS: negative for - fever, chills Respiratory ROS: no SOB, cough, HAMLIN Cardiovascular ROS: no CP, palpitations Gastrointestinal ROS: no N&V, abdominal pain, diarrhea Genito-Urinary ROS: no difficulty voiding, dysuria Neurological ROS: no seizures, focal weekness Objective: 
 
Vitals:  
 04/15/19 1900 04/15/19 2300 04/16/19 0347 04/16/19 0745 BP: 132/82 128/76 128/82 142/89 Pulse: (!) 120 (!) 121 (!) 105 (!) 101 Resp: 17 17 18 18 Temp: 98.2 °F (36.8 °C) 98.3 °F (36.8 °C) 98.2 °F (36.8 °C) 98 °F (36.7 °C) SpO2: 98% 96% 95% 95% Weight:      
Height:      
 
 
PE 
Gen: in no acute distress CV:reg rate Chest:clear Abd: soft Ext/Access: rt IJ tunneledl HD cath Gerard Rodriges LAB Recent Labs 04/16/19 
0559 04/15/19 
2498 04/14/19 
8531 WBC 21.7* 20.4*  --   
HGB 7.0* 8.0* 8.3* HCT 20.6* 23.2* 24.3*  
 265  --   
INR 2.1 1.3 1.2 Recent Labs 04/16/19 
0559 04/15/19 
6414 04/14/19 
4041  134* 136  
K 4.7 4.9 4.5  
 99 102 CO2 29 26 26 * 149* 148* BUN 34* 49* 35* CREA 5.08* 7.91* 6.57* CA 8.5 8.5 8.3 ALB 2.2* 2.0* 1.8* TBILI 0.3 0.3 0.2 ALT 98* 114* 125* SGOT 55* 80* 101* Radiology A/P:  
Patient Active Problem List  
Diagnosis Code  Sepsis ([facility] A41.9  Hepatitis K75.9  
 Substance use disorder F19.90  
 Hip pain M25.559  Hyponatremia E87.1  [facility] N17.9  
 UTI (urinary tract infection) N39.0  Hypertension I10  Rhabdomyolysis M62.82  
 Asthma J45.909  Borderline personality disorder ([facility] F60.3  Constipation K59.00  
 Encephalopathy, toxic G92  
 Toxic metabolic encephalopathy I63 CIRA - no signs of renal recovery yet. For dialysis again tomorrow. Anemia - getting worse. Will order PRBC, HTN Alex Shah MD

## 2019-04-16 NOTE — PROGRESS NOTES
Warfarin dosing per pharmacist 
 
Jermaine Gutierres is a 33 y.o. female. Height: 4' 11\" (149.9 cm)    Weight: 77.7 kg (171 lb 4.8 oz) Indication:  thrombosis R peroneal vein Goal INR:  2-3 Home dose:  New start Risk factors or significant drug interactions:  none Other anticoagulants:  Heparin bridge Daily Monitoring Date  INR     Warfarin dose HGB              Notes 4/8  1.0  ---  12.3 
-----  
4/12  ---  5 mg  9.7 4/13  1  5 mg  --- 
4/14  1.2  7 mg  8.3 4/15  1.3  7 mg  8.0  
4/16  2.1  5 mg  7.0 Pharmacy consulted to dose and monitor warfarin for Ms. Hernandez with a R peroneal vein thrombosis. Continue heparin drip until INR is therapeutic X48h. INR jump to 2.1. Decrease back to 5 mg this evening. Following daily INR. Thank you, Artist Boast, Pharm. D. Clinical Pharmacist 
[phone]

## 2019-04-16 NOTE — PROGRESS NOTES
Nursing staff notified me that the patient is complaining of hip pain. Upon reviewing her chart again, her UDS has returned. As noted in prior nursing note from earlier this evening, pt did leave the floor with a visitor, but returned. She was given clear instructions that if she leaves the floor again, she will have left AMA. I ordered a UDS at that time. UDS is positive for AMPHETAMINES. On admission (4/6/19), she was also positive for amphetamines. It does not appear that she has been receiving any medications while hospitalized that would cause a false positive. Amphetamines should not still be present in her system 10 days after initial UDS. I have suspicion that patient left the floor with her visitor and could have taken illicit drugs.

## 2019-04-16 NOTE — PROGRESS NOTES
Infection Control called asking for RN to take boyle catheter out. Spoke with Dr. Chaya Restrepo regarding this and due to rhabdo, DVT and acute kidney injury requiring dialysis, MD wants boyle to stay in.

## 2019-04-16 NOTE — PROGRESS NOTES
Hospitalist Progress Note 2019 Admit Date: 2019  4:59 PM  
NAME: Shubham Becerra :  1990 DOS:              19 MRN:  [id number] Attending: Temi Son MD 
PCP:  None Treatment Team: Attending Provider: Janeth Aleman MD; Consulting Provider: Asia Ventura MD; Care Manager: Ghazal Jimenez RN; Utilization Review: Leo Frye RN; Consulting Provider: Jody Clayton MD; Consulting Provider: Ivory Mustafa MD 
 
Full Code SUBJECTIVE: As previously documented: 34 yo female with PMH of polysubstance use who is evaluated with malaise and bilateral hip pain. She has UDS showing amphetamines, benzo, opiates. In the ED patient was found to have UA (+) for UTI, CIRA ,rhabdo and elevated LFTs. CT brain and CXR with no acute disease. Patient also was found to have a R peroneal vein thrombosis on US, started on heparin drip. Vascular evaluated patient for suspected stenosis of L external iliac artery with no acute intervention recommended. Nephrology following due to CIRA, no renal recovery so far requiring HD. Rheumatologist recommended trial of steroids taper for suspected myositis. 19    Lena Hernandez  Stated last night when to parking lot to smoke a cigarette. Patient was informed she cannot leave the floor again. Patient also requested medication for anxiety. Patient reported persistent vaginal bleeding. 10+ ROS reviewed and negative except for positive in HPI. Allergies Allergen Reactions  Dilantin [Phenytoin Sodium Extended] Itching  Keppra [Levetiracetam] Rash Current Facility-Administered Medications Medication Dose Route Frequency  ALPRAZolam (XANAX) tablet 1 mg  1 mg Oral BID  warfarin (COUMADIN) tablet 5 mg  5 mg Oral QPM  
 0.9% sodium chloride infusion 250 mL  250 mL IntraVENous PRN  
 methylPREDNISolone (PF) (Solu-MEDROL) injection 40 mg  40 mg IntraVENous Q12H  morphine injection 2 mg  2 mg IntraVENous Q4H PRN  
 amLODIPine (NORVASC) tablet 10 mg  10 mg Oral DAILY  hydrALAZINE (APRESOLINE) 20 mg/mL injection 10 mg  10 mg IntraVENous Q6H PRN  
 diphenhydrAMINE (BENADRYL) capsule 25 mg  25 mg Oral DIALYSIS PRN  
 HYDROcodone-acetaminophen (NORCO)  mg tablet 1 Tab  1 Tab Oral Q6H PRN  
 diphenhydrAMINE (BENADRYL) capsule 25 mg  25 mg Oral Q6H PRN  thiamine HCL (B-1) tablet 100 mg  100 mg Oral DAILY  folic acid (FOLVITE) tablet 1 mg  1 mg Oral DAILY  heparin 25,000 units in dextrose 500 mL infusion  18-36 Units/kg/hr IntraVENous TITRATE  sodium chloride (NS) flush 5-10 mL  5-10 mL IntraVENous PRN  
 ondansetron (ZOFRAN) injection 4 mg  4 mg IntraVENous Q6H PRN  
 nicotine (NICODERM CQ) 21 mg/24 hr patch 1 Patch  1 Patch TransDERmal DAILY  LORazepam (ATIVAN) injection 1 mg  1 mg IntraVENous Q6H PRN  
 naloxone (NARCAN) injection 0.4 mg  0.4 mg IntraVENous EVERY 2 MINUTES AS NEEDED There is no immunization history on file for this patient. Objective:  
 
Patient Vitals for the past 24 hrs: 
 Temp Pulse Resp BP SpO2  
19 1206 98.3 °F (36.8 °C) (!) 105 20 135/87 97 % 19 0745 98 °F (36.7 °C) (!) 101 18 142/89 95 % 19 0347 98.2 °F (36.8 °C) (!) 105 18 128/82 95 % 04/15/19 2300 98.3 °F (36.8 °C) (!) 121 17 128/76 96 % 04/15/19 1900 98.2 °F (36.8 °C) (!) 120 17 132/82 98 % 04/15/19 1602 97.8 °F (36.6 °C) (!) 124 18 138/88 96 % 04/15/19 1522  (!) 117  141/75   
04/15/19 1502  (!) 112  135/85   
04/15/19 1430  (!) 114  (!) 133/93   
04/15/19 1340  (!) 113  (!) 149/92   
04/15/19 1254  (!) 101  149/87  Temp (24hrs), Av.1 °F (36.7 °C), Min:97.8 °F (36.6 °C), Max:98.3 °F (36.8 °C) Oxygen Therapy O2 Sat (%): 97 % (19 1206) Pulse via Oximetry: 127 beats per minute (19 2119) O2 Device: Room air (04/15/19 1600) Oxygen Therapy O2 Sat (%): 97 % (19 1206) Pulse via Oximetry: 127 beats per minute (04/06/19 2119) O2 Device: Room air (04/15/19 1600) Physical Exam: 
General:         Alert, cooperative, no distress HEENT:               NCAT. No obvious deformity. Lungs: No wheezing/rhonchi/rales Cardiovascular:   RRR. No m/r/g. Abdomen:       S/nt/nd. Bowel sounds normal. .  
Skin:         Diffused generalized pain Neurologic:    No gross focal deficit. Psychiatric:         Good mood. Normal affect. Extremities:         Swelling on LE b/l, some tenderness to palpation. DIAGNOSTIC STUDIES Data Review:  
Recent Results (from the past 24 hour(s)) PTT Collection Time: 04/15/19  1:05 PM  
Result Value Ref Range aPTT 153.6 (H) 24.7 - 39.8 SEC  
PTT Collection Time: 04/15/19 10:35 PM  
Result Value Ref Range aPTT 84.4 (H) 24.7 - 39.8 SEC DRUG SCREEN, URINE Collection Time: 04/15/19 10:35 PM  
Result Value Ref Range PCP(PHENCYCLIDINE) NEGATIVE BENZODIAZEPINES POSITIVE    
 COCAINE NEGATIVE AMPHETAMINES POSITIVE METHADONE NEGATIVE     
 THC (TH-CANNABINOL) NEGATIVE     
 OPIATES POSITIVE    
 BARBITURATES NEGATIVE     
CK Collection Time: 04/16/19  5:59 AM  
Result Value Ref Range  (H) 21 - 215 U/L  
PROTHROMBIN TIME + INR Collection Time: 04/16/19  5:59 AM  
Result Value Ref Range Prothrombin time 23.1 (H) 11.7 - 14.5 sec INR 2.1 METABOLIC PANEL, COMPREHENSIVE Collection Time: 04/16/19  5:59 AM  
Result Value Ref Range Sodium 137 136 - 145 mmol/L Potassium 4.7 3.5 - 5.1 mmol/L Chloride 102 98 - 107 mmol/L  
 CO2 29 21 - 32 mmol/L Anion gap 6 (L) 7 - 16 mmol/L Glucose 143 (H) 65 - 100 mg/dL BUN 34 (H) 6 - 23 MG/DL Creatinine 5.08 (H) 0.6 - 1.0 MG/DL  
 GFR est AA 13 (L) >60 ml/min/1.73m2 GFR est non-AA 11 (L) >60 ml/min/1.73m2 Calcium 8.5 8.3 - 10.4 MG/DL  Bilirubin, total 0.3 0.2 - 1.1 MG/DL  
 ALT (SGPT) 98 (H) 12 - 65 U/L  
 AST (SGOT) 55 (H) 15 - 37 U/L Alk. phosphatase 30 (L) 50 - 136 U/L Protein, total 5.1 (L) 6.3 - 8.2 g/dL Albumin 2.2 (L) 3.5 - 5.0 g/dL Globulin 2.9 2.3 - 3.5 g/dL A-G Ratio 0.8 (L) 1.2 - 3.5    
CBC WITH AUTOMATED DIFF Collection Time: 04/16/19  5:59 AM  
Result Value Ref Range WBC 21.7 (H) 4.3 - 11.1 K/uL  
 RBC 2.24 (L) 4.05 - 5.2 M/uL HGB 7.0 (L) 11.7 - 15.4 g/dL HCT 20.6 (LL) 35.8 - 46.3 % MCV 92.0 79.6 - 97.8 FL  
 MCH 31.3 26.1 - 32.9 PG  
 MCHC 34.0 31.4 - 35.0 g/dL  
 RDW 15.5 (H) 11.9 - 14.6 % PLATELET 303 442 - 487 K/uL MPV 10.4 9.4 - 12.3 FL ABSOLUTE NRBC 0.00 0.0 - 0.2 K/uL  
 DF AUTOMATED NEUTROPHILS 82 (H) 43 - 78 % LYMPHOCYTES 8 (L) 13 - 44 % MONOCYTES 6 4.0 - 12.0 % EOSINOPHILS 0 (L) 0.5 - 7.8 % BASOPHILS 0 0.0 - 2.0 % IMMATURE GRANULOCYTES 4 0.0 - 5.0 %  
 ABS. NEUTROPHILS 17.8 (H) 1.7 - 8.2 K/UL  
 ABS. LYMPHOCYTES 1.7 0.5 - 4.6 K/UL  
 ABS. MONOCYTES 1.3 0.1 - 1.3 K/UL  
 ABS. EOSINOPHILS 0.0 0.0 - 0.8 K/UL  
 ABS. BASOPHILS 0.0 0.0 - 0.2 K/UL  
 ABS. IMM. GRANS. 0.9 (H) 0.0 - 0.5 K/UL  
PTT Collection Time: 04/16/19  5:59 AM  
Result Value Ref Range aPTT 154.9 (H) 24.7 - 39.8 SEC  
TYPE + CROSSMATCH Collection Time: 04/16/19 11:07 AM  
Result Value Ref Range Crossmatch Expiration 04/19/2019 ABO/Rh(D) AB NEGATIVE Antibody screen NEG All Micro Results Procedure Component Value Units Date/Time CULTURE, BLOOD [[id number]] Collected:  04/07/19 2130 Order Status:  Completed Specimen:  Blood Updated:  04/13/19 6884 Special Requests: --     
  LEFT 
ARM Culture result: NO GROWTH 5 DAYS     
 CULTURE, BLOOD [[id number]] Collected:  04/06/19 2032 Order Status:  Completed Specimen:  Blood Updated:  04/11/19 0057 Special Requests: --     
  LEFT 
HAND Culture result: NO GROWTH 5 DAYS     
 CULTURE, URINE [[id number]] Collected:  04/06/19 2479 Order Status:  Completed Specimen:  Cath Urine Updated:  19 1617 Special Requests: NO SPECIAL REQUESTS Culture result: NO GROWTH 2 DAYS     
 CULTURE, URINE [[id number]] Collected:  19 Order Status:  Canceled Specimen:  Urine from Clean catch CULTURE, BLOOD [[id number]] Collected:  19 Order Status:  Canceled Specimen:  Blood Imaging [facility] /Studies: CXR Results  (Last 48 hours) None CT Results  (Last 48 hours) None Xr Hip Rt W Or Wo Pelv 2-3 Vws Result Date: 2019 IMPRESSION: No acute bone abnormality. No significant change compared to 2019 study. The hip pain may be related to underlying rhabdomyolysis. Results for orders placed or performed during the hospital encounter of 19  
2D ECHO COMPLETE ADULT (TTE) W OR WO CONTR Narrative [address] [place], [address] [facility] 
[phone] Transthoracic Echocardiogram 
2D, M-mode, Doppler, and Color Doppler Patient: Montana Schmidt MR #: [id number] : 1990 Age: 33 years Gender: Female Study date: 2019 Account #: [de-identified] Height: 59 in 
Weight: 124.7 lb 
BSA: 1.51 mï¾² Status:Routine Location: 205 BP: 120/ 78 Allergies: PHENYTOIN SODIUM EXTENDED, LEVETIRACETAM 
 
Sonographer:  Pennie Willis RDCS Group:  [facility] Referring Physician:  Hoda Charles. Rodolfo Duarte MD 
Reading Physician:  Jessenia Martínez. Nela Lopez MD [facility] INDICATIONS: PVD. *Per patient- unable to turn onto left side due to blood clot in leg. * PROCEDURE: This was a routine study. A transthoracic echocardiogram was 
performed. The study included complete 2D imaging, M-mode, complete spectral 
Doppler, and color Doppler. Echocardiographic views were limited by  
restricted 
patient mobility and poor acoustic window availability. Image quality was 
adequate. LEFT VENTRICLE: Size was normal. Systolic function was normal. Ejection 
fraction was estimated in the range of 55 % to 60 %. There were no regional 
wall motion abnormalities. Wall thickness was normal. The E/e' ratio was 6.2. Left ventricular diastolic function parameters were normal. 
 
RIGHT VENTRICLE: The size was normal. Systolic function was normal. The 
tricuspid jet envelope definition was inadequate for estimation of RV  
systolic 
pressure. LEFT ATRIUM: Size was normal. 
 
RIGHT ATRIUM: Size was normal. 
 
SYSTEMIC VEINS: IVC: The inferior vena cava was normal in size and course. AORTIC VALVE: The valve was structurally normal, tri-commissural. There was  
no 
evidence for stenosis. There was no insufficiency. MITRAL VALVE: Valve structure was normal. There was no evidence for stenosis. There was trivial regurgitation. TRICUSPID VALVE: The valve structure was normal. There was no evidence for 
stenosis. There was no regurgitation. PULMONIC VALVE: Not well visualized. There was no evidence for stenosis. There 
was trivial regurgitation. PERICARDIUM: There was no pericardial effusion. AORTA: The root exhibited normal size. SUMMARY: 
 
-  Left ventricle: Systolic function was normal. Ejection fraction was 
estimated in the range of 55 % to 60 %. There were no regional wall motion 
abnormalities. SYSTEM MEASUREMENT TABLES 
 
2D mode AoR Diam (2D): 2.5 cm 
LA Dimension (2D): 3 cm Left Atrium Systolic Volume Index; Method of Disks, Biplane; 2D mode;: 15.2 
ml/m2 IVS/LVPW (2D): 1.1 IVSd (2D): 1 cm LVIDd (2D): 3.8 cm LVIDs (2D): 2.5 cm 
LVOT Area (2D): 3.1 cm2 LVPWd (2D): 0.9 cm Tissue Doppler Imaging LV Peak Early Charles Tissue Win; Lateral MA (TDI): 17.2 cm/s LV Peak Early Charles Tissue Win; Medial MA (TDI): 8.2 cm/s Unspecified Scan Mode Peak Grad; Mean; Antegrade Flow: 8 mm[Hg] Vmax; Antegrade Flow: 134 cm/s LVOT Diam: 2 cm MV Peak Win/LV Peak Tissue Win E-Wave; Lateral MA: 4 MV Peak Win/LV Peak Tissue Win E-Wave; Medial MA: 8.4 Prepared and signed by 
 
Lloyd Chavarria. [address], MD [facility] Signed 08-Apr-2019 12:22:39 Labs and Studies from previous 24 hours have been personally reviewed by myself   
ASSESSMENT Active Hospital Problems Diagnosis Date Noted  Encephalopathy, toxic 04/08/2019  Toxic metabolic encephalopathy 65/88/7832  Rhabdomyolysis 04/07/2019  Sepsis ([facility] 04/06/2019  Hepatitis 04/06/2019  Substance use disorder 04/06/2019  Hip pain 04/06/2019  Hyponatremia 04/06/2019  CIRA (acute kidney injury) ([facility] 04/06/2019  UTI (urinary tract infection) 04/06/2019  Hypertension 04/06/2019  Asthma 05/19/2016 Last Assessment & Plan:  
Albuterol prn  Borderline personality disorder ([facility] 05/19/2016 Last Assessment & Plan:  
Continue Prozac, Seroquel, and trazodone Hospital Problems as of 4/16/2019 Date Reviewed: 4/7/2019 Codes Class Noted - Resolved POA Encephalopathy, toxic ICD-10-CM: G92 ICD-9-CM: 349.82  4/8/2019 - Present Unknown Toxic metabolic encephalopathy AJS-[id number]: T75 ICD-9-CM: 349.82  4/8/2019 - Present Unknown Rhabdomyolysis ICD-10-CM: E14.39 ICD-9-CM: 728.88  4/7/2019 - Present Yes * (Principal) Sepsis ([facility] ICD-10-CM: A41.9 ICD-9-CM: 038.9, 995.91  4/6/2019 - Present Yes Hepatitis ICD-10-CM: K75.9 ICD-9-CM: 573.3  4/6/2019 - Present Yes Substance use disorder (Chronic) ICD-10-CM: F19.90 ICD-9-CM: 305.90  4/6/2019 - Present Yes Hip pain ICD-10-CM: M25.559 ICD-9-CM: 719.45  4/6/2019 - Present Yes Hyponatremia ICD-10-CM: E87.1 ICD-9-CM: 276.1  4/6/2019 - Present Yes CIRA (acute kidney injury) ([facility] ICD-10-CM: N17.9 ICD-9-CM: 584.9  4/6/2019 - Present Yes  
   
 UTI (urinary tract infection) ICD-10-CM: N39.0 ICD-9-CM: 599.0  4/6/2019 - Present Yes  Hypertension ICD-10-CM: [employer] 
 ICD-9-CM: 401.9  2019 - Present Yes Asthma (Chronic) ICD-10-CM: J45.909 ICD-9-CM: 493.90  2016 - Present Yes Overview Signed 2019  7:19 AM by GARRET David Last Assessment & Plan:  
Albuterol prn Borderline personality disorder ([address] (Chronic) ICD-10-CM: F60.3 ICD-9-CM: 301.83  2016 - Present Yes Overview Signed 2019  7:19 AM by GARRET David Last Assessment & Plan:  
Continue Prozac, Seroquel, and trazodone A/P: 
 
 
-CIRA Likely secondary to rhabdo No signs of renal recovery yet Discussed with nephrologist NP patient will need at least 3 weeks of HD with no renal recovery for diagnosis of ESRD  informed  
 
-Jalaine  CPK trending down Cont to trend 
 
-Rt peroneal vein dvt 
?due to trauma INR: 2.1 D/c heparin 
 
-RLE tenderness and swelling ? myositis due to amphetamine vs edema from CIRA Leucocytosis likely secondary steroids induced Rheumatology eval appreciated  Cont IV  Steroid,  Taper to 30 mg today Extra fluids will be removed with HD. Hopefully that will improve the pain There is a concern about pain medication seeking behavior. 
 
-Elevated LFTs Likely due to rhabdo Trending down Viral hepatitis panel and HIV neg Cont to trend -HTN Stable Cont  amlodipine 10 mg and  hydralazine PRN 
 
-Sepsis secondary to ? UTI Blood and urine cultures NGTD ID evaluation appreciated. Findings likely secondary to rhabdo Abxs stopped 04/10 
 
- Toxic encephalopathy Likely secondary to amphetamines Resolved 
 
-Anemia Likely due to menorrhagia GYN eval appreciated. Recommended to transfuse PRN and if absolutely necessary progesterone Transfuse if hb <7 Continue to monitor hb 
 
 
DVT Prophylaxis: coumadin CODE Status: Full Plan of Care Discussed with: patient. Care team. 
 
 
Franklin Jesus MD 
19

## 2019-04-16 NOTE — PROGRESS NOTES
The patient left the floor, unattended via wheelchair. Notified Dr. Desirae Stewart. Received orders for a UDS, and to place the patient on a bed alarm. The patient has since returned to her room and was told explicitly that she is not to leave the floor again or it will be considered leaving against medical advice and she will have to return through the emergency department.

## 2019-04-16 NOTE — PROGRESS NOTES
END OF SHIFT NOTE: 
 
INTAKE/OUTPUT 
04/15 0701 - 04/16 0700 In: 187 [I.V.:187] Out: 3200 [Urine:200] Voiding: NO 
Catheter: YES Drain:   
 
 
 
 
 
Flatus: Patient does have flatus present. Stool:  0 occurrences. Characteristics: 
Stool Assessment Stool Color: Naomia Raimundo Stool Appearance: Loose Stool Amount: Medium Stool Source/Status: Rectum Emesis: 0 occurrences. Characteristics: VITAL SIGNS Patient Vitals for the past 12 hrs: 
 Temp Pulse Resp BP SpO2  
04/16/19 0347 98.2 °F (36.8 °C) (!) 105 18 128/82 95 % 04/15/19 2300 98.3 °F (36.8 °C) (!) 121 17 128/76 96 % 04/15/19 1900 98.2 °F (36.8 °C) (!) 120 17 132/82 98 % Pain Assessment Pain Intensity 1: 5 (04/15/19 2021) Pain Location 1: Leg 
Pain Intervention(s) 1: Medication (see MAR) Patient Stated Pain Goal: 2 Ambulating Yes Shift report given to oncoming nurse at the bedside.  
 
Holland Ellis RN

## 2019-04-16 NOTE — PROGRESS NOTES
Per patient request, patient would like to have morphine instead of dilaudid, spoke with Dr. Gabriela Khoury, orders received morphine IV 2 MG Q4 an discontinue dilaudid.

## 2019-04-17 LAB
ABO + RH BLD: NORMAL
ALBUMIN SERPL-MCNC: 2.2 G/DL (ref 3.5–5)
ALBUMIN/GLOB SERPL: 0.8 {RATIO} (ref 1.2–3.5)
ALP SERPL-CCNC: 31 U/L (ref 50–136)
ALT SERPL-CCNC: 80 U/L (ref 12–65)
ANION GAP SERPL CALC-SCNC: 9 MMOL/L (ref 7–16)
APTT PPP: 37.2 SEC (ref 24.7–39.8)
AST SERPL-CCNC: 40 U/L (ref 15–37)
BASOPHILS # BLD: 0 K/UL (ref 0–0.2)
BASOPHILS NFR BLD: 0 % (ref 0–2)
BILIRUB SERPL-MCNC: 0.3 MG/DL (ref 0.2–1.1)
BLD PROD TYP BPU: NORMAL
BLOOD GROUP ANTIBODIES SERPL: NORMAL
BPU ID: NORMAL
BUN SERPL-MCNC: 60 MG/DL (ref 6–23)
CALCIUM SERPL-MCNC: 8.4 MG/DL (ref 8.3–10.4)
CHLORIDE SERPL-SCNC: 100 MMOL/L (ref 98–107)
CK SERPL-CCNC: 353 U/L (ref 21–215)
CO2 SERPL-SCNC: 29 MMOL/L (ref 21–32)
CREAT SERPL-MCNC: 6.23 MG/DL (ref 0.6–1)
CROSSMATCH RESULT,%XM: NORMAL
DIFFERENTIAL METHOD BLD: ABNORMAL
EOSINOPHIL # BLD: 0 K/UL (ref 0–0.8)
EOSINOPHIL NFR BLD: 0 % (ref 0.5–7.8)
ERYTHROCYTE [DISTWIDTH] IN BLOOD BY AUTOMATED COUNT: 16.2 % (ref 11.9–14.6)
FERRITIN SERPL-MCNC: 168 NG/ML (ref 8–388)
GLOBULIN SER CALC-MCNC: 2.8 G/DL (ref 2.3–3.5)
GLUCOSE SERPL-MCNC: 139 MG/DL (ref 65–100)
HCT VFR BLD AUTO: 23.9 % (ref 35.8–46.3)
HGB BLD-MCNC: 7.9 G/DL (ref 11.7–15.4)
IMM GRANULOCYTES # BLD AUTO: 0.9 K/UL (ref 0–0.5)
IMM GRANULOCYTES NFR BLD AUTO: 4 % (ref 0–5)
INR PPP: 2.2
IRON SATN MFR SERPL: 34 %
IRON SERPL-MCNC: 82 UG/DL (ref 35–150)
LYMPHOCYTES # BLD: 1.5 K/UL (ref 0.5–4.6)
LYMPHOCYTES NFR BLD: 7 % (ref 13–44)
MCH RBC QN AUTO: 30.2 PG (ref 26.1–32.9)
MCHC RBC AUTO-ENTMCNC: 33.1 G/DL (ref 31.4–35)
MCV RBC AUTO: 91.2 FL (ref 79.6–97.8)
MONOCYTES # BLD: 1.3 K/UL (ref 0.1–1.3)
MONOCYTES NFR BLD: 6 % (ref 4–12)
NEUTS SEG # BLD: 17.8 K/UL (ref 1.7–8.2)
NEUTS SEG NFR BLD: 83 % (ref 43–78)
NRBC # BLD: 0 K/UL (ref 0–0.2)
PLATELET # BLD AUTO: 325 K/UL (ref 150–450)
PMV BLD AUTO: 10.4 FL (ref 9.4–12.3)
POTASSIUM SERPL-SCNC: 4.7 MMOL/L (ref 3.5–5.1)
PROT SERPL-MCNC: 5 G/DL (ref 6.3–8.2)
PROTHROMBIN TIME: 24.4 SEC (ref 11.7–14.5)
RBC # BLD AUTO: 2.62 M/UL (ref 4.05–5.2)
SODIUM SERPL-SCNC: 138 MMOL/L (ref 136–145)
SPECIMEN EXP DATE BLD: NORMAL
STATUS OF UNIT,%ST: NORMAL
TIBC SERPL-MCNC: 238 UG/DL (ref 250–450)
UNIT DIVISION, %UDIV: 0
WBC # BLD AUTO: 21.5 K/UL (ref 4.3–11.1)

## 2019-04-17 PROCEDURE — 83540 ASSAY OF IRON: CPT

## 2019-04-17 PROCEDURE — 85610 PROTHROMBIN TIME: CPT

## 2019-04-17 PROCEDURE — 74011250637 HC RX REV CODE- 250/637: Performed by: INTERNAL MEDICINE

## 2019-04-17 PROCEDURE — 74011250636 HC RX REV CODE- 250/636: Performed by: FAMILY MEDICINE

## 2019-04-17 PROCEDURE — 85730 THROMBOPLASTIN TIME PARTIAL: CPT

## 2019-04-17 PROCEDURE — 85025 COMPLETE CBC W/AUTO DIFF WBC: CPT

## 2019-04-17 PROCEDURE — 65660000000 HC RM CCU STEPDOWN

## 2019-04-17 PROCEDURE — 65270000029 HC RM PRIVATE

## 2019-04-17 PROCEDURE — 82550 ASSAY OF CK (CPK): CPT

## 2019-04-17 PROCEDURE — 90935 HEMODIALYSIS ONE EVALUATION: CPT

## 2019-04-17 PROCEDURE — 74011250637 HC RX REV CODE- 250/637: Performed by: FAMILY MEDICINE

## 2019-04-17 PROCEDURE — 97530 THERAPEUTIC ACTIVITIES: CPT

## 2019-04-17 PROCEDURE — 80053 COMPREHEN METABOLIC PANEL: CPT

## 2019-04-17 PROCEDURE — 82728 ASSAY OF FERRITIN: CPT

## 2019-04-17 PROCEDURE — 74011250636 HC RX REV CODE- 250/636: Performed by: INTERNAL MEDICINE

## 2019-04-17 RX ORDER — ACETAMINOPHEN 325 MG/1
650 TABLET ORAL
Status: COMPLETED | OUTPATIENT
Start: 2019-04-17 | End: 2019-04-17

## 2019-04-17 RX ORDER — ACETAMINOPHEN 325 MG/1
650 TABLET ORAL
Status: DISCONTINUED | OUTPATIENT
Start: 2019-04-17 | End: 2019-04-17

## 2019-04-17 RX ORDER — IBUPROFEN 200 MG
1 TABLET ORAL DAILY
Status: DISCONTINUED | OUTPATIENT
Start: 2019-04-17 | End: 2019-04-17

## 2019-04-17 RX ADMIN — HYDROCODONE BITARTRATE AND ACETAMINOPHEN 1 TABLET: 10; 325 TABLET ORAL at 04:48

## 2019-04-17 RX ADMIN — AMLODIPINE BESYLATE 10 MG: 10 TABLET ORAL at 07:38

## 2019-04-17 RX ADMIN — METHYLPREDNISOLONE SODIUM SUCCINATE 30 MG: 40 INJECTION, POWDER, FOR SOLUTION INTRAMUSCULAR; INTRAVENOUS at 07:39

## 2019-04-17 RX ADMIN — Medication 10 ML: at 20:56

## 2019-04-17 RX ADMIN — ALPRAZOLAM 1 MG: 0.5 TABLET ORAL at 07:38

## 2019-04-17 RX ADMIN — MORPHINE SULFATE 2 MG: 2 INJECTION, SOLUTION INTRAMUSCULAR; INTRAVENOUS at 02:37

## 2019-04-17 RX ADMIN — ACETAMINOPHEN 650 MG: 325 TABLET, FILM COATED ORAL at 15:05

## 2019-04-17 RX ADMIN — MORPHINE SULFATE 2 MG: 2 INJECTION, SOLUTION INTRAMUSCULAR; INTRAVENOUS at 20:49

## 2019-04-17 RX ADMIN — Medication 100 MG: at 07:38

## 2019-04-17 RX ADMIN — HYDROCODONE BITARTRATE AND ACETAMINOPHEN 1 TABLET: 10; 325 TABLET ORAL at 19:27

## 2019-04-17 RX ADMIN — METHYLPREDNISOLONE SODIUM SUCCINATE 20 MG: 40 INJECTION, POWDER, FOR SOLUTION INTRAMUSCULAR; INTRAVENOUS at 20:55

## 2019-04-17 RX ADMIN — Medication 10 ML: at 02:38

## 2019-04-17 RX ADMIN — MORPHINE SULFATE 2 MG: 2 INJECTION, SOLUTION INTRAMUSCULAR; INTRAVENOUS at 11:37

## 2019-04-17 RX ADMIN — HYDROCODONE BITARTRATE AND ACETAMINOPHEN 1 TABLET: 10; 325 TABLET ORAL at 10:42

## 2019-04-17 RX ADMIN — MORPHINE SULFATE 2 MG: 2 INJECTION, SOLUTION INTRAMUSCULAR; INTRAVENOUS at 07:39

## 2019-04-17 RX ADMIN — FOLIC ACID 1 MG: 1 TABLET ORAL at 07:39

## 2019-04-17 RX ADMIN — ALPRAZOLAM 1 MG: 0.5 TABLET ORAL at 16:31

## 2019-04-17 RX ADMIN — WARFARIN SODIUM 5 MG: 5 TABLET ORAL at 16:31

## 2019-04-17 RX ADMIN — MORPHINE SULFATE 2 MG: 2 INJECTION, SOLUTION INTRAMUSCULAR; INTRAVENOUS at 16:32

## 2019-04-17 NOTE — PROGRESS NOTES
Asking this RN to take her off the floor to smoke. Informed pt that this is a non smoking facility and would not be taken off unit to smoke. She replied, \"the doctor said I could go off floor if staff could wheel me out and I know that I can't smoke on campus,but you can take me to the road\". Informed her that this RN would not be taking her off unit. Requested nicotene patch but states,\"I will take it off while I sleep, cause it causes me to have bad dreams\". Explained that if patch placed tonight and taken off, would not be able to get another one until next night.  Agreed to discuss with MD in am

## 2019-04-17 NOTE — PROGRESS NOTES
Problem: Mobility Impaired (Adult and Pediatric) Goal: *Acute Goals and Plan of Care Short Term Goals: 
(1.)Ms. Hernandez will move from supine to sit and sit to supine with STAND BY ASSIST within 3 treatment day(s). Goal Met: 4/17/18 
(2.)Ms. Hernandez will transfer from bed to chair and chair to bed with STAND BY ASSIST using the least restrictive device within 3 treatment day(s). Goal Met: 4/17/18 
(3.)Ms. Hernandez will ambulate with CONTACT GUARD ASSIST for 100 feet with the least restrictive device within 3 treatment day(s). Goal Met: 4/17/18 Long Term Goals: 
(1.)Ms. Hernandez will move from supine to sit and sit to supine with INDEPENDENCE within 7 treatment day(s). (2.)Ms. Hernandez will transfer from bed to chair and chair to bed with INDEPENDENCE using the least restrictive device within 7 treatment day(s). (3.)Ms. Hernandez will ambulate with INDEPENDENCE for 300 feet with the least restrictive device within 7 treatment day(s). (4.)Ms. Hernandez will ascend/descend 2 steps with bilateral railings with INDEPENDENCE to allow access to home environment within 7 treatment day(s) PHYSICAL THERAPY: Daily Note and AM 4/17/2019 INPATIENT: PT Visit Days : 2 Payor: MEDICAID PENDING / Plan: Adelina Nolen PENDING / Product Type: Medicaid /   
  
NAME/AGE/GENDER: Gretel Corral is a 33 y.o. female PRIMARY DIAGNOSIS: Sepsis ([facility] [address] [A41.9] Sepsis ([facility] [address] Sepsis ([facility] [address] ICD-10: Treatment Diagnosis:  
 · Generalized Muscle Weakness (M62.81) · Difficulty in walking, Not elsewhere classified (R26.2) Precaution/Allergies: 
Dilantin [phenytoin sodium extended] and Keppra [levetiracetam] ASSESSMENT:  
Ms. Anjelica Florian is a 34 yo F who was admitted to hospital on 4/6/19 with severe pain. Has significant social and past medical history factors. Prior to admission pt living with her family, independent with all mobility and not using any DME.  She is supine on contact and agreeable to physical therapy treatment and quite pleasant with \"best friend,\" present. She required stand by assist to transfer to sitting, stand by assist to stand and increased ambulation distance to 300' with stand by assist and rolling walker, requiring verbal cues for posture/walker proximity and safety. Noted slow antalgic gait this session. She met all of her short term goals this session and is progressing towards her long term goals. Pt will benefit from skilled therapy services during her acute stay to address stated deficits to allow return to prior level of function for safe discharge. This section established at most recent assessment PROBLEM LIST (Impairments causing functional limitations): 1. Decreased Strength 2. Decreased Transfer Abilities 3. Decreased Ambulation Ability/Technique 4. Decreased Balance 5. Increased Pain 6. Decreased Activity Tolerance 7. Decreased Pacing Skills 8. Increased Fatigue 9. Decreased [place] with Home Exercise Program 
 INTERVENTIONS PLANNED: (Benefits and precautions of physical therapy have been discussed with the patient.) 1. Balance Exercise 2. Bed Mobility 3. Family Education 4. Gait Training 5. Home Exercise Program (HEP) 6. Neuromuscular Re-education/Strengthening 7. Therapeutic Activites 8. Therapeutic Exercise/Strengthening 9. Transfer Training TREATMENT PLAN: Frequency/Duration: 3 times a week for duration of hospital stay Rehabilitation Potential For Stated Goals: Good RECOMMENDED REHABILITATION/EQUIPMENT: (at time of discharge pending progress): Due to the probability of continued deficits (see above) this patient will likely need continued skilled physical therapy after discharge. Equipment:  
? None at this time HISTORY:  
History of Present Injury/Illness (Reason for Referral): 
 
Per chart: Ms. Carmen Dougherty is a 33 y.o. Female admitted with complaints of malaise and bilateral hip pain.  PMH significant for polysubstance abuse, chronic back pain, seizure, bipolar disorder, and asthma. ER labs significant for amphetamines, benzodiazepines, and opiates on toxicology. Past Medical History/Comorbidities: Ms. Sruthi Hoang  has a past medical history of Asthma, Chronic back pain, Chronic lower back pain, Psychiatric disorder, and Seizures ([facility] [address] Ms. Sruthi Hoang  has a past surgical history that includes hx gyn; ir insert non tunl cvc over 5 yrs (4/8/2019); and ir insert non tunl cvc over 5 yrs (4/10/2019). Social History/Living Environment:  
Home Environment: Private residence # Steps to Enter: 2 Rails to Enter: Yes One/Two Story Residence: One story Living Alone: No 
Support Systems: Family member(s) Patient Expects to be Discharged to[de-identified] Private residence Current DME Used/Available at Home: None Prior Level of Function/Work/Activity: 
Independent with all mobility, has 2 children and a boyfriend. Number of Personal Factors/Comorbidities that affect the Plan of Care: 3+: HIGH COMPLEXITY EXAMINATION:  
Most Recent Physical Functioning:  
Gross Assessment: 
  
         
  
Posture: 
  
Balance: 
Sitting: Intact Standing: Impaired Standing - Static: Good Standing - Dynamic : Fair Bed Mobility: 
Rolling: Stand-by assistance Supine to Sit: Stand-by assistance Sit to Supine: Stand-by assistance Scooting: Stand-by assistance Wheelchair Mobility: 
  
Transfers: 
Sit to Stand: Contact guard assistance;Stand-by assistance Stand to Sit: Contact guard assistance;Stand-by assistance Interventions: Safety awareness training;Verbal cues; Visual cues Gait: 
  
Step Length: Right shortened;Left shortened Gait Abnormalities: Antalgic;Decreased step clearance;Trunk sway increased Distance (ft): 300 Feet (ft) Assistive Device: Walker, rolling Ambulation - Level of Assistance: Contact guard assistance;Stand-by assistance Body Structures Involved: 1. Nerves 2. Heart 3. Metabolic 4. Bones 5. Joints 6. Muscles Body Functions Affected: 1. Hematological 
2. Neuromusculoskeletal 
3. Movement Related 4. Metobolic/Endocrine Activities and Participation Affected: 1. General Tasks and Demands 2. Mobility 3. Interpersonal Interactions and Relationships 4. Community, Social and [place] Number of elements that affect the Plan of Care: 4+: HIGH COMPLEXITY CLINICAL PRESENTATION:  
Presentation: Evolving clinical presentation with changing clinical characteristics: MODERATE COMPLEXITY CLINICAL DECISION MAKING:  
[employer] AM-PAC 6 Clicks Basic Mobility Inpatient Short Form How much difficulty does the patient currently have. .. Unable A Lot A Little None 1. Turning over in bed (including adjusting bedclothes, sheets and blankets)? ? 1   ? 2   ? 3   ? 4  
2. Sitting down on and standing up from a chair with arms ( e.g., wheelchair, bedside commode, etc.)   ? 1   ? 2   ? 3   ? 4  
3. Moving from lying on back to sitting on the side of the bed?   ? 1   ? 2   ? 3   ? 4 How much help from another person does the patient currently need. .. Total A Lot A Little None 4. Moving to and from a bed to a chair (including a wheelchair)? ? 1   ? 2   ? 3   ? 4  
5. Need to walk in hospital room? ? 1   ? 2   ? 3   ? 4  
6. Climbing 3-5 steps with a railing? ? 1   ? 2   ? 3   ? 4  
© 2007, Trustees of [employer], under license to [employer]. All rights reserved Score:  Initial: 18 Most Recent: X (Date: -- ) Interpretation of Tool:  Represents activities that are increasingly more difficult (i.e. Bed mobility, Transfers, Gait). Medical Necessity:    
· Patient demonstrates fair ·  rehab potential due to higher previous functional level. · Skilled intervention continues to be required due to address functional BLE strength deficits, as well as gait, standing balance, and activity tolerance impairments.  
Reason for Services/Other Comments: 
· Patient continues to require skilled intervention due to medical complications · .and decreased activity tolerance, balance, gait, BLE functional strength. Use of outcome tool(s) and clinical judgement create a POC that gives a: Questionable prediction of patient's progress: MODERATE COMPLEXITY  
  
 
 
 
TREATMENT:  
(In addition to Assessment/Re-Assessment sessions the following treatments were rendered) Pre-treatment Symptoms/Complaints:  \"I am hurting all over, but I can try. \" 
Pain: Initial:  
Pain Intensity 1: 7  Post Session:  unchanged Therapeutic Activity: (    23 min): Therapeutic activities including Bed transfers, Chair transfers, Ambulation on level ground and standing balance controll  to improve mobility, strength, balance and coordination. Required minimal   verbal cues to promote static and dynamic balance in standing and promote coordination of bilateral, lower extremity(s). Date: 
4/12/19 Date: 
 Date: Activity/Exercise Parameters Parameters Parameters Seated LAQ x15 BLE Seated Marches x15 BLE Seated Ankle Pumps x15 BLE Braces/Orthotics/Lines/Etc:  
· IV 
· boyle catheter · O2 Device: Room air Treatment/Session Assessment:   
· Response to Treatment:  Pt able to participate in all mobility with increased time; no change in pain levels · Interdisciplinary Collaboration:  
o Physical Therapist 
o Registered Nurse · After treatment position/precautions:  
o Supine in bed 
o Bed/Chair-wheels locked 
o Bed in low position 
o Call light within reach 
o RN notified 
o Visitors at bedside · Compliance with Program/Exercises: Will assess as treatment progresses · Recommendations/Intent for next treatment session: \"Next visit will focus on advancements to more challenging activities and reduction in assistance provided\". Total Treatment Duration: PT Patient Time In/Time Out Time In: 9864 Time Out: 3514 Sherie Argueta DPT, [place]

## 2019-04-17 NOTE — PROGRESS NOTES
Warfarin dosing per pharmacist 
 
Tamar Montesinos is a 33 y.o. female. Height: 4' 11\" (149.9 cm)    Weight: 79.2 kg (174 lb 8 oz) Indication:  thrombosis R peroneal vein Goal INR:  2-3 Home dose:  New start Risk factors or significant drug interactions:  Vaginal bleeding Other anticoagulants:  Heparin bridge (stopped 4/16/19) Daily Monitoring Date  INR     Warfarin dose HGB              Notes 4/8  1.0  ---  12.3 
-----  
4/12  ---  5 mg  9.7 4/13  1  5 mg  --- 
4/14  1.2  7 mg  8.3 4/15  1.3  7 mg  8.0  
4/16  2.1  5 mg  7.0  
4/17  2.2  5 mg  7.9 Pharmacy consulted to dose and monitor warfarin for Ms. Hernandez with a R peroneal vein thrombosis. INR today continues to trend slightly up to 2.2. Heparin drip noted to be stopped yesterday due to jump in INR. Noted that patient having vaginal bleeding as examined today by OB/GYN. Will attempt to keep INR 2 to 2.5 for now to minimize bleeding risk. Will give 5 mg this evening. Continue daily INR. Thank you, 
Heidy Still, PharmD Clinical Pharmacist 
[phone]

## 2019-04-17 NOTE — PROGRESS NOTES
Renal Progress Note Admission Date: 4/6/2019 Subjective: The patient was seen on dialysis at 1:34 PM .  BP is stable. Catheter is functioning well. Objective:  
 
Physical Exam:   
Patient Vitals for the past 8 hrs: 
 BP Temp Pulse Resp SpO2  
04/17/19 1300 133/82  (!) 113    
04/17/19 1230 144/87  (!) 108    
04/17/19 1202 135/86  (!) 108    
04/17/19 1152 134/82  (!) 108    
04/17/19 1135 134/85 98 °F (36.7 °C) (!) 114 18 99 % 04/17/19 0736 124/82 98 °F (36.7 °C) 100 17 94 % Gen: comfortable , NAD HEENT: Dry membranes CV: S1, S2 
Lungs: Clear bilaterally Extem: 2+ edema Current Facility-Administered Medications Medication Dose Route Frequency  methylPREDNISolone (PF) (SOLU-MEDROL) injection 20 mg  20 mg IntraVENous Q12H  ALPRAZolam (XANAX) tablet 1 mg  1 mg Oral BID  warfarin (COUMADIN) tablet 5 mg  5 mg Oral QPM  
 0.9% sodium chloride infusion 250 mL  250 mL IntraVENous PRN  
 morphine injection 2 mg  2 mg IntraVENous Q4H PRN  
 amLODIPine (NORVASC) tablet 10 mg  10 mg Oral DAILY  hydrALAZINE (APRESOLINE) 20 mg/mL injection 10 mg  10 mg IntraVENous Q6H PRN  
 diphenhydrAMINE (BENADRYL) capsule 25 mg  25 mg Oral DIALYSIS PRN  
 HYDROcodone-acetaminophen (NORCO)  mg tablet 1 Tab  1 Tab Oral Q6H PRN  
 diphenhydrAMINE (BENADRYL) capsule 25 mg  25 mg Oral Q6H PRN  thiamine HCL (B-1) tablet 100 mg  100 mg Oral DAILY  folic acid (FOLVITE) tablet 1 mg  1 mg Oral DAILY  sodium chloride (NS) flush 5-10 mL  5-10 mL IntraVENous PRN  
 ondansetron (ZOFRAN) injection 4 mg  4 mg IntraVENous Q6H PRN  
 nicotine (NICODERM CQ) 21 mg/24 hr patch 1 Patch  1 Patch TransDERmal DAILY  LORazepam (ATIVAN) injection 1 mg  1 mg IntraVENous Q6H PRN  
 naloxone (NARCAN) injection 0.4 mg  0.4 mg IntraVENous EVERY 2 MINUTES AS NEEDED Data Review:  
 
LABS:  
Recent Results (from the past 12 hour(s)) CK  Collection Time: 04/17/19  5:01 AM  
 Result Value Ref Range  (H) 21 - 215 U/L  
PROTHROMBIN TIME + INR Collection Time: 04/17/19  5:01 AM  
Result Value Ref Range Prothrombin time 24.4 (H) 11.7 - 14.5 sec INR 2.2 PTT Collection Time: 04/17/19  5:01 AM  
Result Value Ref Range aPTT 37.2 24.7 - 39.8 SEC  
CBC WITH AUTOMATED DIFF Collection Time: 04/17/19  5:01 AM  
Result Value Ref Range WBC 21.5 (H) 4.3 - 11.1 K/uL  
 RBC 2.62 (L) 4.05 - 5.2 M/uL HGB 7.9 (L) 11.7 - 15.4 g/dL HCT 23.9 (L) 35.8 - 46.3 % MCV 91.2 79.6 - 97.8 FL  
 MCH 30.2 26.1 - 32.9 PG  
 MCHC 33.1 31.4 - 35.0 g/dL  
 RDW 16.2 (H) 11.9 - 14.6 % PLATELET 746 047 - 466 K/uL MPV 10.4 9.4 - 12.3 FL ABSOLUTE NRBC 0.00 0.0 - 0.2 K/uL  
 DF AUTOMATED NEUTROPHILS 83 (H) 43 - 78 % LYMPHOCYTES 7 (L) 13 - 44 % MONOCYTES 6 4.0 - 12.0 % EOSINOPHILS 0 (L) 0.5 - 7.8 % BASOPHILS 0 0.0 - 2.0 % IMMATURE GRANULOCYTES 4 0.0 - 5.0 %  
 ABS. NEUTROPHILS 17.8 (H) 1.7 - 8.2 K/UL  
 ABS. LYMPHOCYTES 1.5 0.5 - 4.6 K/UL  
 ABS. MONOCYTES 1.3 0.1 - 1.3 K/UL  
 ABS. EOSINOPHILS 0.0 0.0 - 0.8 K/UL  
 ABS. BASOPHILS 0.0 0.0 - 0.2 K/UL  
 ABS. IMM. GRANS. 0.9 (H) 0.0 - 0.5 K/UL METABOLIC PANEL, COMPREHENSIVE Collection Time: 04/17/19  5:01 AM  
Result Value Ref Range Sodium 138 136 - 145 mmol/L Potassium 4.7 3.5 - 5.1 mmol/L Chloride 100 98 - 107 mmol/L  
 CO2 29 21 - 32 mmol/L Anion gap 9 7 - 16 mmol/L Glucose 139 (H) 65 - 100 mg/dL BUN 60 (H) 6 - 23 MG/DL Creatinine 6.23 (H) 0.6 - 1.0 MG/DL  
 GFR est AA 10 (L) >60 ml/min/1.73m2 GFR est non-AA 8 (L) >60 ml/min/1.73m2 Calcium 8.4 8.3 - 10.4 MG/DL Bilirubin, total 0.3 0.2 - 1.1 MG/DL  
 ALT (SGPT) 80 (H) 12 - 65 U/L  
 AST (SGOT) 40 (H) 15 - 37 U/L Alk. phosphatase 31 (L) 50 - 136 U/L Protein, total 5.0 (L) 6.3 - 8.2 g/dL Albumin 2.2 (L) 3.5 - 5.0 g/dL Globulin 2.8 2.3 - 3.5 g/dL A-G Ratio 0.8 (L) 1.2 - 3.5 TRANSFERRIN SATURATION  
 Collection Time: 04/17/19  5:01 AM  
Result Value Ref Range Iron 82 35 - 150 ug/dL TIBC 238 (L) 250 - 450 ug/dL Transferrin Saturation 34 >20 % FERRITIN Collection Time: 04/17/19  5:01 AM  
Result Value Ref Range Ferritin 168 8 - 388 NG/ML Plan:  
 
Principal Problem: 
  Sepsis ([facility] [address] (4/6/2019) Active Problems: 
  Hepatitis (4/6/2019) Substance use disorder (4/6/2019) Hip pain (4/6/2019) Hyponatremia (4/6/2019) CIRA (acute kidney injury) ([address] (4/6/2019) UTI (urinary tract infection) (4/6/2019) Hypertension (4/6/2019) Rhabdomyolysis (4/7/2019) Asthma (5/19/2016) Overview: Last Assessment & Plan:  
    Albuterol prn Borderline personality disorder ([facility] [address] (5/19/2016) Overview: Last Assessment & Plan:  
    Continue Prozac, Seroquel, and trazodone Encephalopathy, toxic (4/8/2019) Toxic metabolic encephalopathy (1/4/0431) CIRA with rhabdo-  ondialysis for support. First HD  was 4/10/2019 . Initially anuric. Today 750ml of urine. Dialysis ongoing with fluid removal and clearance Had a discussion about options. She had a normal baseline and still has a chance for recovery. If no recovery, then she would like PD as her modality of choice.

## 2019-04-17 NOTE — DIALYSIS
TRANSFER IN - DIALYSIS Received patient in dialysis unit  from [facility] (unit) for ordered procedure. Consent verified for renal replacement therapy. Patient alert and vital signs stable. /86 P108 Hemodialysis initiated using right PC. Aspirated and flushed both ports without difficulty. Dressing clean, dry and intact. Machine settings per MD order. Will monitor during treatment.

## 2019-04-17 NOTE — PROGRESS NOTES
Hospitalist Progress Note 2019 Admit Date: 2019  4:59 PM  
NAME: Marline Asher :  1990 DOS:              19 MRN:  [id number] Attending: Kate Milian MD 
PCP:  None Treatment Team: Attending Provider: Marcus Hoyt MD; Consulting Provider: Octavia Mendoza MD; Care Manager: Munira Gilbert, RN; Utilization Review: Oren Rodriguez RN; Consulting Provider: Vicky Welch MD; Consulting Provider: Rodney Bird MD; Physical Therapist: Adell Snellen, DPT Full Code SUBJECTIVE: As previously documented: 32 yo female with PMH of polysubstance use who is evaluated with malaise and bilateral hip pain. She has UDS showing amphetamines, benzo, opiates. In the ED patient was found to have UA (+) for UTI, CIRA ,rhabdo and elevated LFTs. CT brain and CXR with no acute disease. Patient also was found to have a R peroneal vein thrombosis on US, started on heparin drip. Vascular evaluated patient for suspected stenosis of L external iliac artery with no acute intervention recommended. Nephrology following due to CIRA, no renal recovery so far requiring HD. Rheumatologist recommended trial of steroids taper for suspected myositis. 19    Lena Hernandez  Stated feeling much better this morning. Patient reported pain is controlled. Patient was walking with a walker in the hallway with PT. Patient requested medication for anxiety to be increased. 10+ ROS reviewed and negative except for positive in HPI. Allergies Allergen Reactions  Dilantin [Phenytoin Sodium Extended] Itching  Keppra [Levetiracetam] Rash Current Facility-Administered Medications Medication Dose Route Frequency  nicotine (NICODERM CQ) 14 mg/24 hr patch 1 Patch  1 Patch TransDERmal DAILY  ALPRAZolam (XANAX) tablet 1 mg  1 mg Oral BID  warfarin (COUMADIN) tablet 5 mg  5 mg Oral QPM  
 0.9% sodium chloride infusion 250 mL  250 mL IntraVENous PRN  
  methylPREDNISolone (PF) (Solu-MEDROL) injection 30 mg  30 mg IntraVENous Q12H  
 morphine injection 2 mg  2 mg IntraVENous Q4H PRN  
 amLODIPine (NORVASC) tablet 10 mg  10 mg Oral DAILY  hydrALAZINE (APRESOLINE) 20 mg/mL injection 10 mg  10 mg IntraVENous Q6H PRN  
 diphenhydrAMINE (BENADRYL) capsule 25 mg  25 mg Oral DIALYSIS PRN  
 HYDROcodone-acetaminophen (NORCO)  mg tablet 1 Tab  1 Tab Oral Q6H PRN  
 diphenhydrAMINE (BENADRYL) capsule 25 mg  25 mg Oral Q6H PRN  thiamine HCL (B-1) tablet 100 mg  100 mg Oral DAILY  folic acid (FOLVITE) tablet 1 mg  1 mg Oral DAILY  sodium chloride (NS) flush 5-10 mL  5-10 mL IntraVENous PRN  
 ondansetron (ZOFRAN) injection 4 mg  4 mg IntraVENous Q6H PRN  
 nicotine (NICODERM CQ) 21 mg/24 hr patch 1 Patch  1 Patch TransDERmal DAILY  LORazepam (ATIVAN) injection 1 mg  1 mg IntraVENous Q6H PRN  
 naloxone (NARCAN) injection 0.4 mg  0.4 mg IntraVENous EVERY 2 MINUTES AS NEEDED There is no immunization history on file for this patient. Objective:  
 
Patient Vitals for the past 24 hrs: 
 Temp Pulse Resp BP SpO2  
19 1152  (!) 108  134/82   
19 1135 98 °F (36.7 °C) (!) 114 18 134/85 99 % 19 0736 98 °F (36.7 °C) 100 17 124/82 94 % 19 0300 98 °F (36.7 °C) (!) 106 17 136/81 94 % 19 2300 97.8 °F (36.6 °C) (!) 123 17 135/87 94 % 19 1900 97.9 °F (36.6 °C) (!) 121 17 123/69 94 % 19 1611 98.1 °F (36.7 °C) (!) 109 18 143/89   
19 1538 97.8 °F (36.6 °C) (!) 107 18 120/90   
19 1521 97.5 °F (36.4 °C) (!) 106 18 140/87 97 % 19 1415 98.6 °F (37 °C) (!) 109 18 132/85   
19 1359 98.7 °F (37.1 °C) (!) 107 18 132/85  Temp (24hrs), Av °F (36.7 °C), Min:97.5 °F (36.4 °C), Max:98.7 °F (37.1 °C) Oxygen Therapy O2 Sat (%): 99 % (19 1135) Pulse via Oximetry: 127 beats per minute (19 2119) O2 Device: Room air (04/15/19 1600) Oxygen Therapy O2 Sat (%): 99 % (04/17/19 1135) Pulse via Oximetry: 127 beats per minute (04/06/19 2119) O2 Device: Room air (04/15/19 1600) Physical Exam: 
General:         Alert, cooperative, no distress HEENT:               NCAT. No obvious deformity. Lungs: No wheezing/rhonchi/rales Cardiovascular:   RRR. No m/r/g. Abdomen:       S/nt/nd. Bowel sounds normal. .  
Skin:         Diffused generalized pain Neurologic:    No gross focal deficit. Psychiatric:         Good mood. Normal affect. Extremities:         Swelling on LE b/l, minimal tenderness to palpation. DIAGNOSTIC STUDIES Data Review:  
Recent Results (from the past 24 hour(s)) CK Collection Time: 04/17/19  5:01 AM  
Result Value Ref Range  (H) 21 - 215 U/L  
PROTHROMBIN TIME + INR Collection Time: 04/17/19  5:01 AM  
Result Value Ref Range Prothrombin time 24.4 (H) 11.7 - 14.5 sec INR 2.2 PTT Collection Time: 04/17/19  5:01 AM  
Result Value Ref Range aPTT 37.2 24.7 - 39.8 SEC  
CBC WITH AUTOMATED DIFF Collection Time: 04/17/19  5:01 AM  
Result Value Ref Range WBC 21.5 (H) 4.3 - 11.1 K/uL  
 RBC 2.62 (L) 4.05 - 5.2 M/uL HGB 7.9 (L) 11.7 - 15.4 g/dL HCT 23.9 (L) 35.8 - 46.3 % MCV 91.2 79.6 - 97.8 FL  
 MCH 30.2 26.1 - 32.9 PG  
 MCHC 33.1 31.4 - 35.0 g/dL  
 RDW 16.2 (H) 11.9 - 14.6 % PLATELET 877 473 - 516 K/uL MPV 10.4 9.4 - 12.3 FL ABSOLUTE NRBC 0.00 0.0 - 0.2 K/uL  
 DF AUTOMATED NEUTROPHILS 83 (H) 43 - 78 % LYMPHOCYTES 7 (L) 13 - 44 % MONOCYTES 6 4.0 - 12.0 % EOSINOPHILS 0 (L) 0.5 - 7.8 % BASOPHILS 0 0.0 - 2.0 % IMMATURE GRANULOCYTES 4 0.0 - 5.0 %  
 ABS. NEUTROPHILS 17.8 (H) 1.7 - 8.2 K/UL  
 ABS. LYMPHOCYTES 1.5 0.5 - 4.6 K/UL  
 ABS. MONOCYTES 1.3 0.1 - 1.3 K/UL  
 ABS. EOSINOPHILS 0.0 0.0 - 0.8 K/UL  
 ABS. BASOPHILS 0.0 0.0 - 0.2 K/UL  
 ABS. IMM. GRANS. 0.9 (H) 0.0 - 0.5 K/UL METABOLIC PANEL, COMPREHENSIVE  
 Collection Time: 04/17/19  5:01 AM  
Result Value Ref Range Sodium 138 136 - 145 mmol/L Potassium 4.7 3.5 - 5.1 mmol/L Chloride 100 98 - 107 mmol/L  
 CO2 29 21 - 32 mmol/L Anion gap 9 7 - 16 mmol/L Glucose 139 (H) 65 - 100 mg/dL BUN 60 (H) 6 - 23 MG/DL Creatinine 6.23 (H) 0.6 - 1.0 MG/DL  
 GFR est AA 10 (L) >60 ml/min/1.73m2 GFR est non-AA 8 (L) >60 ml/min/1.73m2 Calcium 8.4 8.3 - 10.4 MG/DL Bilirubin, total 0.3 0.2 - 1.1 MG/DL  
 ALT (SGPT) 80 (H) 12 - 65 U/L  
 AST (SGOT) 40 (H) 15 - 37 U/L Alk. phosphatase 31 (L) 50 - 136 U/L Protein, total 5.0 (L) 6.3 - 8.2 g/dL Albumin 2.2 (L) 3.5 - 5.0 g/dL Globulin 2.8 2.3 - 3.5 g/dL A-G Ratio 0.8 (L) 1.2 - 3.5 TRANSFERRIN SATURATION Collection Time: 04/17/19  5:01 AM  
Result Value Ref Range Iron 82 35 - 150 ug/dL TIBC 238 (L) 250 - 450 ug/dL Transferrin Saturation 34 >20 % FERRITIN Collection Time: 04/17/19  5:01 AM  
Result Value Ref Range Ferritin 168 8 - 388 NG/ML All Micro Results Procedure Component Value Units Date/Time CULTURE, BLOOD [[id number]] Collected:  04/07/19 2130 Order Status:  Completed Specimen:  Blood Updated:  04/13/19 3431 Special Requests: --     
  LEFT 
ARM Culture result: NO GROWTH 5 DAYS     
 CULTURE, BLOOD [[id number]] Collected:  04/06/19 2032 Order Status:  Completed Specimen:  Blood Updated:  04/11/19 6703 Special Requests: --     
  LEFT 
HAND Culture result: NO GROWTH 5 DAYS     
 CULTURE, URINE [[id number]] Collected:  04/06/19 1857 Order Status:  Completed Specimen:  Cath Urine Updated:  04/09/19 1300 Special Requests: NO SPECIAL REQUESTS Culture result: NO GROWTH 2 DAYS     
 CULTURE, URINE [[id number]] Collected:  04/06/19 2145 Order Status:  Canceled Specimen:  Urine from Clean catch CULTURE, BLOOD [[id number]] Collected:  04/06/19 2000 Order Status:  Canceled Specimen:  Blood Imaging Clara Ramirez /Studies: CXR Results  (Last 48 hours) None CT Results  (Last 48 hours) None No results found. Results for orders placed or performed during the hospital encounter of 19  
2D ECHO COMPLETE ADULT (TTE) W OR WO CONTR Narrative [facility] [address] [place], [address] [facility] 
[phone] Transthoracic Echocardiogram 
2D, M-mode, Doppler, and Color Doppler Patient: Carmelita Mcdonald MR #: [id number] : 1990 Age: 33 years Gender: Female Study date: 2019 Account #: [de-identified] Height: 59 in 
Weight: 124.7 lb 
BSA: 1.51 mï¾² Status:Routine Location: 205 BP: 120/ 78 Allergies: PHENYTOIN SODIUM EXTENDED, LEVETIRACETAM 
 
Sonographer:  Luma Gonzalez [facility] Group:  [facility] Referring Physician:  Kathy Macias MD 
Reading Physician:  Sona Garcia. Josh Bhagat MD [facility] INDICATIONS: PVD. *Per patient- unable to turn onto left side due to blood clot in leg. * PROCEDURE: This was a routine study. A transthoracic echocardiogram was 
performed. The study included complete 2D imaging, M-mode, complete spectral 
Doppler, and color Doppler. Echocardiographic views were limited by  
restricted 
patient mobility and poor acoustic window availability. Image quality was 
adequate. LEFT VENTRICLE: Size was normal. Systolic function was normal. Ejection 
fraction was estimated in the range of 55 % to 60 %. There were no regional 
wall motion abnormalities. Wall thickness was normal. The E/e' ratio was 6.2. Left ventricular diastolic function parameters were normal. 
 
RIGHT VENTRICLE: The size was normal. Systolic function was normal. The 
tricuspid jet envelope definition was inadequate for estimation of RV  
systolic 
pressure. LEFT ATRIUM: Size was normal. 
 
RIGHT ATRIUM: Size was normal. 
 
SYSTEMIC VEINS: IVC: The inferior vena cava was normal in size and course. AORTIC VALVE: The valve was structurally normal, tri-commissural. There was  
no 
evidence for stenosis. There was no insufficiency. MITRAL VALVE: Valve structure was normal. There was no evidence for stenosis. There was trivial regurgitation. TRICUSPID VALVE: The valve structure was normal. There was no evidence for 
stenosis. There was no regurgitation. PULMONIC VALVE: Not well visualized. There was no evidence for stenosis. There 
was trivial regurgitation. PERICARDIUM: There was no pericardial effusion. AORTA: The root exhibited normal size. SUMMARY: 
 
-  Left ventricle: Systolic function was normal. Ejection fraction was 
estimated in the range of 55 % to 60 %. There were no regional wall motion 
abnormalities. SYSTEM MEASUREMENT TABLES 
 
2D mode AoR Diam (2D): 2.5 cm 
LA Dimension (2D): 3 cm Left Atrium Systolic Volume Index; Method of Disks, Biplane; 2D mode;: 15.2 
ml/m2 IVS/LVPW (2D): 1.1 IVSd (2D): 1 cm LVIDd (2D): 3.8 cm LVIDs (2D): 2.5 cm 
LVOT Area (2D): 3.1 cm2 LVPWd (2D): 0.9 cm Tissue Doppler Imaging LV Peak Early Charles Tissue Win; Lateral MA (TDI): 17.2 cm/s LV Peak Early Charles Tissue Win; Medial MA (TDI): 8.2 cm/s Unspecified Scan Mode Peak Grad; Mean; Antegrade Flow: 8 mm[Hg] Vmax; Antegrade Flow: 134 cm/s LVOT Diam: 2 cm 
MV Peak Win/LV Peak Tissue Win E-Wave; Lateral MA: 4 MV Peak Win/LV Peak Tissue Win E-Wave; Medial MA: 8.4 Prepared and signed by 
 
Ivania Pederson. Brandyn Melo MD [facility] [place], [place] Signed 08-Apr-2019 12:22:39 Labs and Studies from previous 24 hours have been personally reviewed by myself   
ASSESSMENT Active Hospital Problems Diagnosis Date Noted  Encephalopathy, toxic 04/08/2019  Toxic metabolic encephalopathy 15/07/1753  Rhabdomyolysis 04/07/2019  Sepsis ([facility] 04/06/2019  Hepatitis 04/06/2019  Substance use disorder 04/06/2019  Hip pain 04/06/2019  Hyponatremia 04/06/2019  CIRA (acute kidney injury) ([facility] 04/06/2019  UTI (urinary tract infection) 04/06/2019  Hypertension 04/06/2019  Asthma 05/19/2016 Last Assessment & Plan:  
Albuterol prn  Borderline personality disorder ([facility] 05/19/2016 Last Assessment & Plan:  
Continue Prozac, Seroquel, and trazodone Hospital Problems as of 4/17/2019 Date Reviewed: 4/7/2019 Codes Class Noted - Resolved POA Encephalopathy, toxic ICD-10-CM: G92 ICD-9-CM: 349.82  4/8/2019 - Present Unknown Toxic metabolic encephalopathy RRF-13-LV: P97 ICD-9-CM: 349.82  4/8/2019 - Present Unknown Rhabdomyolysis ICD-10-CM: A74.35 ICD-9-CM: 728.88  4/7/2019 - Present Yes * (Principal) Sepsis ([facility] ICD-10-CM: A41.9 ICD-9-CM: 038.9, 995.91  4/6/2019 - Present Yes Hepatitis ICD-10-CM: K75.9 ICD-9-CM: 573.3  4/6/2019 - Present Yes Substance use disorder (Chronic) ICD-10-CM: F19.90 ICD-9-CM: 305.90  4/6/2019 - Present Yes Hip pain ICD-10-CM: M25.559 ICD-9-CM: 719.45  4/6/2019 - Present Yes Hyponatremia ICD-10-CM: E87.1 ICD-9-CM: 276.1  4/6/2019 - Present Yes CIRA (acute kidney injury) ([facility] ICD-10-CM: N17.9 ICD-9-CM: 584.9  4/6/2019 - Present Yes  
   
 UTI (urinary tract infection) ICD-10-CM: N39.0 ICD-9-CM: 599.0  4/6/2019 - Present Yes Hypertension ICD-10-CM: I10 
ICD-9-CM: 401.9  4/6/2019 - Present Yes Asthma (Chronic) ICD-10-CM: J45.909 ICD-9-CM: 493.90  5/19/2016 - Present Yes Overview Signed 4/7/2019  7:19 AM by GARRET Roth Last Assessment & Plan:  
Albuterol prn Borderline personality disorder ([facility] (Chronic) ICD-10-CM: F60.3 ICD-9-CM: 301.83  5/19/2016 - Present Yes Overview Signed 4/7/2019  7:19 AM by GARRET Roth Last Assessment & Plan:  
Continue Prozac, Seroquel, and trazodone A/P: 
 
 
-CIRA Likely secondary to rhabdo No signs of renal recovery yet Discussed with nephrologist NP on 04/16 patient will need at least 3 weeks of HD with no renal recovery for diagnosis of ESRD  following 
 
-Debera Lynch CPK trending down Cont to trend for 1 more day 
 
-Rt peroneal vein dvt ? Likely provoked due to trauma per family members history INR: 2.1 Cont warfarin. -RLE tenderness and swelling Improving ?myositis induced by  amphetamines vs edema from CIRA Leucocytosis likely secondary steroids induced Rheumatology recommended over the phone 04/14. If not improvement after steroids taper call rheumatologist 
Cont IV  Steroid,  Taper to 20 mg today, taper to 10 mg tomorrow 
 
-Elevated LFTs Likely due to rhabdo Trending down Viral hepatitis panel and HIV neg Cont to trend -HTN Stable Cont  amlodipine 10 mg and  hydralazine PRN 
 
-Sepsis secondary to ? UTI Blood and urine cultures NGTD ID evaluation appreciated. Findings likely secondary to rhabdo Abxs stopped 04/10 
 
- Toxic encephalopathy Likely secondary to amphetamines Resolved 
 
-Anemia Likely due to menorrhagia GYN eval appreciated. Recommended to transfuse PRN and if absolutely necessary progesterone Transfuse if hb <7 Continue to monitor hb 
 
 
DVT Prophylaxis: coumadin CODE Status: Full Plan of Care Discussed with: patient. Care team. 
 
 
Enriqueta Rmairez MD 
04/17/19

## 2019-04-18 LAB
ALBUMIN SERPL-MCNC: 2.3 G/DL (ref 3.5–5)
ALBUMIN/GLOB SERPL: 0.9 {RATIO} (ref 1.2–3.5)
ALP SERPL-CCNC: 32 U/L (ref 50–136)
ALT SERPL-CCNC: 64 U/L (ref 12–65)
ANION GAP SERPL CALC-SCNC: 7 MMOL/L (ref 7–16)
AST SERPL-CCNC: 28 U/L (ref 15–37)
BASOPHILS # BLD: 0 K/UL (ref 0–0.2)
BASOPHILS NFR BLD: 0 % (ref 0–2)
BILIRUB SERPL-MCNC: 0.2 MG/DL (ref 0.2–1.1)
BUN SERPL-MCNC: 46 MG/DL (ref 6–23)
CALCIUM SERPL-MCNC: 8 MG/DL (ref 8.3–10.4)
CHLORIDE SERPL-SCNC: 105 MMOL/L (ref 98–107)
CK SERPL-CCNC: 186 U/L (ref 21–215)
CO2 SERPL-SCNC: 29 MMOL/L (ref 21–32)
CREAT SERPL-MCNC: 4.35 MG/DL (ref 0.6–1)
DIFFERENTIAL METHOD BLD: ABNORMAL
EOSINOPHIL # BLD: 0 K/UL (ref 0–0.8)
EOSINOPHIL NFR BLD: 0 % (ref 0.5–7.8)
ERYTHROCYTE [DISTWIDTH] IN BLOOD BY AUTOMATED COUNT: 15.9 % (ref 11.9–14.6)
GLOBULIN SER CALC-MCNC: 2.7 G/DL (ref 2.3–3.5)
GLUCOSE SERPL-MCNC: 145 MG/DL (ref 65–100)
HCT VFR BLD AUTO: 23.9 % (ref 35.8–46.3)
HGB BLD-MCNC: 7.7 G/DL (ref 11.7–15.4)
IMM GRANULOCYTES # BLD AUTO: 1.2 K/UL (ref 0–0.5)
IMM GRANULOCYTES NFR BLD AUTO: 6 % (ref 0–5)
INR PPP: 2.1
LYMPHOCYTES # BLD: 1.6 K/UL (ref 0.5–4.6)
LYMPHOCYTES NFR BLD: 8 % (ref 13–44)
MCH RBC QN AUTO: 30.3 PG (ref 26.1–32.9)
MCHC RBC AUTO-ENTMCNC: 32.2 G/DL (ref 31.4–35)
MCV RBC AUTO: 94.1 FL (ref 79.6–97.8)
MONOCYTES # BLD: 1.8 K/UL (ref 0.1–1.3)
MONOCYTES NFR BLD: 9 % (ref 4–12)
NEUTS SEG # BLD: 15.8 K/UL (ref 1.7–8.2)
NEUTS SEG NFR BLD: 77 % (ref 43–78)
NRBC # BLD: 0 K/UL (ref 0–0.2)
PLATELET # BLD AUTO: 357 K/UL (ref 150–450)
PLATELET COMMENTS,PCOM: ADEQUATE
PMV BLD AUTO: 10.2 FL (ref 9.4–12.3)
POTASSIUM SERPL-SCNC: 4.4 MMOL/L (ref 3.5–5.1)
PROT SERPL-MCNC: 5 G/DL (ref 6.3–8.2)
PROTHROMBIN TIME: 23.4 SEC (ref 11.7–14.5)
RBC # BLD AUTO: 2.54 M/UL (ref 4.05–5.2)
RBC MORPH BLD: ABNORMAL
RBC MORPH BLD: ABNORMAL
SODIUM SERPL-SCNC: 141 MMOL/L (ref 136–145)
WBC # BLD AUTO: 20.4 K/UL (ref 4.3–11.1)
WBC MORPH BLD: ABNORMAL

## 2019-04-18 PROCEDURE — 74011250636 HC RX REV CODE- 250/636: Performed by: INTERNAL MEDICINE

## 2019-04-18 PROCEDURE — 82550 ASSAY OF CK (CPK): CPT

## 2019-04-18 PROCEDURE — 65270000029 HC RM PRIVATE

## 2019-04-18 PROCEDURE — 85025 COMPLETE CBC W/AUTO DIFF WBC: CPT

## 2019-04-18 PROCEDURE — 80053 COMPREHEN METABOLIC PANEL: CPT

## 2019-04-18 PROCEDURE — 74011250637 HC RX REV CODE- 250/637: Performed by: INTERNAL MEDICINE

## 2019-04-18 PROCEDURE — 65660000000 HC RM CCU STEPDOWN

## 2019-04-18 PROCEDURE — 74011250637 HC RX REV CODE- 250/637: Performed by: FAMILY MEDICINE

## 2019-04-18 PROCEDURE — 97530 THERAPEUTIC ACTIVITIES: CPT

## 2019-04-18 PROCEDURE — 85610 PROTHROMBIN TIME: CPT

## 2019-04-18 PROCEDURE — 74011250636 HC RX REV CODE- 250/636: Performed by: FAMILY MEDICINE

## 2019-04-18 RX ADMIN — DIPHENHYDRAMINE HYDROCHLORIDE 25 MG: 25 CAPSULE ORAL at 15:53

## 2019-04-18 RX ADMIN — Medication 100 MG: at 10:07

## 2019-04-18 RX ADMIN — HYDROCODONE BITARTRATE AND ACETAMINOPHEN 1 TABLET: 10; 325 TABLET ORAL at 02:34

## 2019-04-18 RX ADMIN — MORPHINE SULFATE 2 MG: 2 INJECTION, SOLUTION INTRAMUSCULAR; INTRAVENOUS at 19:09

## 2019-04-18 RX ADMIN — HYDROCODONE BITARTRATE AND ACETAMINOPHEN 1 TABLET: 10; 325 TABLET ORAL at 16:56

## 2019-04-18 RX ADMIN — MORPHINE SULFATE 2 MG: 2 INJECTION, SOLUTION INTRAMUSCULAR; INTRAVENOUS at 00:43

## 2019-04-18 RX ADMIN — Medication 10 ML: at 00:43

## 2019-04-18 RX ADMIN — METHYLPREDNISOLONE SODIUM SUCCINATE 10 MG: 40 INJECTION, POWDER, FOR SOLUTION INTRAMUSCULAR; INTRAVENOUS at 21:16

## 2019-04-18 RX ADMIN — HYDROCODONE BITARTRATE AND ACETAMINOPHEN 1 TABLET: 10; 325 TABLET ORAL at 09:49

## 2019-04-18 RX ADMIN — MORPHINE SULFATE 2 MG: 2 INJECTION, SOLUTION INTRAMUSCULAR; INTRAVENOUS at 15:02

## 2019-04-18 RX ADMIN — ALPRAZOLAM 1 MG: 0.5 TABLET ORAL at 17:38

## 2019-04-18 RX ADMIN — MORPHINE SULFATE 2 MG: 2 INJECTION, SOLUTION INTRAMUSCULAR; INTRAVENOUS at 10:27

## 2019-04-18 RX ADMIN — Medication 10 ML: at 21:16

## 2019-04-18 RX ADMIN — ALPRAZOLAM 1 MG: 0.5 TABLET ORAL at 11:21

## 2019-04-18 RX ADMIN — Medication 10 ML: at 23:08

## 2019-04-18 RX ADMIN — MORPHINE SULFATE 2 MG: 2 INJECTION, SOLUTION INTRAMUSCULAR; INTRAVENOUS at 05:54

## 2019-04-18 RX ADMIN — FOLIC ACID 1 MG: 1 TABLET ORAL at 10:07

## 2019-04-18 RX ADMIN — METHYLPREDNISOLONE SODIUM SUCCINATE 20 MG: 40 INJECTION, POWDER, FOR SOLUTION INTRAMUSCULAR; INTRAVENOUS at 08:29

## 2019-04-18 RX ADMIN — AMLODIPINE BESYLATE 10 MG: 10 TABLET ORAL at 10:07

## 2019-04-18 RX ADMIN — MORPHINE SULFATE 2 MG: 2 INJECTION, SOLUTION INTRAMUSCULAR; INTRAVENOUS at 23:07

## 2019-04-18 RX ADMIN — WARFARIN SODIUM 5 MG: 5 TABLET ORAL at 17:38

## 2019-04-18 NOTE — PROGRESS NOTES
END OF SHIFT NOTE: 
 
INTAKE/OUTPUT 
04/17 0701 - 04/18 0700 In: 1480 [P.O.:1480] Out: 3770 [Urine:950] Voiding: YES Catheter: YES Drain:   
 
 
 
 
 
Flatus: Patient does have flatus present. Stool:  0 occurrences. Characteristics: 
Stool Assessment Stool Color: Desma Pickle Stool Appearance: Loose Stool Amount: Medium Stool Source/Status: Rectum Emesis: 0 occurrences. Characteristics: VITAL SIGNS Patient Vitals for the past 12 hrs: 
 Temp Pulse Resp BP SpO2  
04/18/19 0345 98.3 °F (36.8 °C) (!) 115 18 128/76 95 % 04/17/19 2350 98.2 °F (36.8 °C) (!) 114 19 135/81 98 % 04/17/19 2000 98.2 °F (36.8 °C) (!) 119 19 129/75 100 % Pain Assessment Pain Intensity 1: 8 (04/18/19 0555) Pain Location 1: Leg 
Pain Intervention(s) 1: Medication (see MAR) Patient Stated Pain Goal: 0 Ambulating No 
 
Shift report given to oncoming nurse at the bedside.  
 
Andres Coe RN

## 2019-04-18 NOTE — PROGRESS NOTES
Problem: Patient Education: Go to Patient Education Activity Goal: Patient/Family Education Description 1. Patient will complete total body bathing and dressing with MOD I and adaptive equipment as needed. 2. Patient will complete toileting with MOD I.  
3. Patient will tolerate 23 minutes of OT treatment with 2-3 rest breaks to increase activity tolerance for ADLs. 4. Patient will complete functional transfers with MOD I and adaptive equipment as needed. 5. Patient will complete functional mobility for household distances with MOD I and adaptive equipment as needed. 6. Patient will complete self-grooming while standing edge of sink with MOD I. Timeframe: 7 visits Outcome: Progressing Towards Goal 
 
 
OCCUPATIONAL THERAPY: Daily Note and PM  
 4/18/2019 INPATIENT: OT Visit Days: 1 Payor: SELF PAY / Plan: [facility] SELF PAY / Product Type: Self Pay /  
  
NAME/AGE/GENDER: Jose Luis Vieyra is a 33 y.o. female PRIMARY DIAGNOSIS:  Sepsis ([facility] [A41.9] Sepsis ([facility] Sepsis ([facility] ICD-10: Treatment Diagnosis:  
 · Generalized Muscle Weakness (M62.81) Precautions/Allergies: 
  Fall precautions  Dilantin [phenytoin sodium extended] and Keppra [levetiracetam] ASSESSMENT:  
Ms. Max Yanes presents for sepsis. 4/18/2019 Pt presents in supine upon arrival. Pt agreeable to treatment and completed supine to sit transfer with SBA. Pt stood with CGA and completed functional mobility in room and in the hallway with a rolling walker and CGA. Pt completed some standing mobility with HHA due to wanting to attempt without a walker. Pt returned to room and stated that she was too short of breath to participate in further activity. Pt returned to supine with SBA and visitor in room. RN notified. Continue POC. This section established at most recent assessment PROBLEM LIST (Impairments causing functional limitations): 1. Decreased Strength 2. Decreased ADL/Functional Activities 3. Decreased Transfer Abilities 4. Decreased Ambulation Ability/Technique 5. Decreased Balance 6. Decreased Activity Tolerance 7. Increased Fatigue INTERVENTIONS PLANNED: (Benefits and precautions of occupational therapy have been discussed with the patient.) 1. Activities of daily living training 2. Adaptive equipment training 3. Balance training 4. Clothing management 5. Community reintergration 6. Donning&doffing training 7. Neuromuscular re-eduation 8. Re-evaluation 9. Therapeutic activity 10. Therapeutic exercise TREATMENT PLAN: Frequency/Duration: Follow patient 3x/week to address above goals. Rehabilitation Potential For Stated Goals: Good RECOMMENDED REHABILITATION/EQUIPMENT: (at time of discharge pending progress): Due to the probability of continued deficits (see above) this patient will likely need continued skilled occupational therapy after discharge. Equipment: ? TBD   
    
 
 
 
OCCUPATIONAL PROFILE AND HISTORY:  
History of Present Injury/Illness (Reason for Referral): 
See H&P Past Medical History/Comorbidities: Ms. Arabella Hemphill  has a past medical history of Asthma, Chronic back pain, Chronic lower back pain, Psychiatric disorder, and Seizures ([facility] Ms. Arabella Hemphill  has a past surgical history that includes hx gyn; ir insert non tunl cvc over 5 yrs (4/8/2019); and ir insert non tunl cvc over 5 yrs (4/10/2019). Social History/Living Environment:  
Home Environment: Private residence # Steps to Enter: 2 Rails to Enter: Yes One/Two Story Residence: One story Living Alone: No 
Support Systems: Family member(s) Patient Expects to be Discharged to[de-identified] Private residence Current DME Used/Available at Home: None Prior Level of Function/Work/Activity: 
Independent with ADLs and functional mobility. Personal Factors:   
      Sex:  female Age:  33 y.o. Other factors that influence how disability is experienced by the patient:  multiple co-morbidities Number of Personal Factors/Comorbidities that affect the Plan of Care: Brief history (0):  LOW COMPLEXITY ASSESSMENT OF OCCUPATIONAL PERFORMANCE[de-identified]  
Activities of Daily Living:  
Basic ADLs (From Assessment) Complex ADLs (From Assessment) Feeding: Independent Oral Facial Hygiene/Grooming: Independent Bathing: Moderate assistance Upper Body Dressing: Independent Lower Body Dressing: Minimum assistance Toileting: Minimum assistance Instrumental ADL Meal Preparation: Maximum assistance Homemaking: Maximum assistance Medication Management: Maximum assistance Financial Management: Maximum assistance Grooming/Bathing/Dressing Activities of Daily Living Bed/Mat Mobility Rolling: Stand-by assistance Supine to Sit: Stand-by assistance Sit to Supine: Stand-by assistance Sit to Stand: Contact guard assistance Stand to Sit: Contact guard assistance Scooting: Stand-by assistance Most Recent Physical Functioning:  
Gross Assessment: 
  
         
  
Posture: 
  
Balance: 
Sitting: Intact Sitting - Static: Good (unsupported) Sitting - Dynamic: Good (unsupported) Standing: Impaired Standing - Static: Good Standing - Dynamic : Fair Bed Mobility: 
Rolling: Stand-by assistance Supine to Sit: Stand-by assistance Sit to Supine: Stand-by assistance Scooting: Stand-by assistance Wheelchair Mobility: 
  
Transfers: 
Sit to Stand: Contact guard assistance Stand to Sit: Contact guard assistance Patient Vitals for the past 6 hrs: 
 BP BP Patient Position SpO2 Pulse 04/18/19 1118 142/85 Supine 98 % 98  
04/18/19 1531 130/81  100 % (!) 112 Mental Status Neurologic State: Alert Orientation Level: Oriented X4 Cognition: Follows commands Perception: Appears intact Perseveration: No perseveration noted Safety/Judgement: Awareness of environment Physical Skills Involved: 
1. Balance 2. Strength 3. Activity Tolerance 4. Pain (acute) Cognitive Skills Affected (resulting in the inability to perform in a timely and safe manner): 1. none  Psychosocial Skills Affected: 1. Habits/Routines Number of elements that affect the Plan of Care: 3-5:  MODERATE COMPLEXITY CLINICAL DECISION MAKIN[address] [place] [address] AM-PAC 6 Clicks Daily Activity Inpatient Short Form How much help from another person does the patient currently need. .. Total A Lot A Little None 1. Putting on and taking off regular lower body clothing? ? 1   ? 2   ? 3   ? 4  
2. Bathing (including washing, rinsing, drying)? ? 1   ? 2   ? 3   ? 4  
3. Toileting, which includes using toilet, bedpan or urinal?   ? 1   ? 2   ? 3   ? 4  
4. Putting on and taking off regular upper body clothing? ? 1   ? 2   ? 3   ? 4  
5. Taking care of personal grooming such as brushing teeth? ? 1   ? 2   ? 3   ? 4  
6. Eating meals? ? 1   ? 2   ? 3   ? 4  
© , Trustees of [address] [place], [place] [id number], under license to [employer]. All rights reserved Score:  Initial: 20 Most Recent: X (Date: -- ) Interpretation of Tool:  Represents activities that are increasingly more difficult (i.e. Bed mobility, Transfers, Gait). Medical Necessity:    
· Patient is expected to demonstrate progress in strength, balance, coordination and functional technique ·  to decrease assistance required with ADLs and functional mobility. · . Reason for Services/Other Comments: 
· Patient continues to require skilled intervention due to medical complications and patient unable to attend/participate in therapy as expected · . Use of outcome tool(s) and clinical judgement create a POC that gives a: LOW COMPLEXITY  
 
 
 
TREATMENT:  
(In addition to Assessment/Re-Assessment sessions the following treatments were rendered) Pre-treatment Symptoms/Complaints:   
Pain: Initial:  
Pain Intensity 1: 0 /10 Post Session:  same Therapeutic Activity: (13 minutes): Therapeutic activities including Bed transfers and Ambulation on level ground to improve mobility, strength and balance. Required CGA  to promote static and dynamic balance in standing. Braces/Orthotics/Lines/Etc:  
· IV 
· O2 Device: Room air Treatment/Session Assessment:   
· Response to Treatment:  Decreased activity tolerance · Interdisciplinary Collaboration:  
o Certified Occupational Therapy Assistant 
o Registered Nurse · After treatment position/precautions:  
o Supine in bed 
o Bed/Chair-wheels locked 
o Call light within reach 
o RN notified 
o Visitors at bedside · Compliance with Program/Exercises: Will assess as treatment progresses. · Recommendations/Intent for next treatment session: \"Next visit will focus on advancements to more challenging activities and reduction in assistance provided\". Total Treatment Duration: OT Patient Time In/Time Out Time In: 1430 Time Out: 1443 Talon Enriquez

## 2019-04-18 NOTE — PROGRESS NOTES
Renal Progress Note Admission Date: 4/6/2019 Subjective: Not feeling as well. Objective:  
 
Physical Exam:   
Patient Vitals for the past 8 hrs: 
 BP Temp Pulse Resp SpO2 Weight 04/18/19 0746 124/80 97.7 °F (36.5 °C) 100 17 97 %   
04/18/19 0345 128/76 98.3 °F (36.8 °C) (!) 115 18 95 % 78.3 kg (172 lb 11.2 oz) Gen: comfortable , NAD HEENT: Dry membranes CV: S1, S2 
Lungs: Clear bilaterally Extem: 2+ edema Current Facility-Administered Medications Medication Dose Route Frequency  methylPREDNISolone (PF) (SOLU-MEDROL) injection 20 mg  20 mg IntraVENous Q12H  ALPRAZolam (XANAX) tablet 1 mg  1 mg Oral BID  warfarin (COUMADIN) tablet 5 mg  5 mg Oral QPM  
 0.9% sodium chloride infusion 250 mL  250 mL IntraVENous PRN  
 morphine injection 2 mg  2 mg IntraVENous Q4H PRN  
 amLODIPine (NORVASC) tablet 10 mg  10 mg Oral DAILY  hydrALAZINE (APRESOLINE) 20 mg/mL injection 10 mg  10 mg IntraVENous Q6H PRN  
 diphenhydrAMINE (BENADRYL) capsule 25 mg  25 mg Oral DIALYSIS PRN  
 HYDROcodone-acetaminophen (NORCO)  mg tablet 1 Tab  1 Tab Oral Q6H PRN  
 diphenhydrAMINE (BENADRYL) capsule 25 mg  25 mg Oral Q6H PRN  thiamine HCL (B-1) tablet 100 mg  100 mg Oral DAILY  folic acid (FOLVITE) tablet 1 mg  1 mg Oral DAILY  sodium chloride (NS) flush 5-10 mL  5-10 mL IntraVENous PRN  
 ondansetron (ZOFRAN) injection 4 mg  4 mg IntraVENous Q6H PRN  
 nicotine (NICODERM CQ) 21 mg/24 hr patch 1 Patch  1 Patch TransDERmal DAILY  LORazepam (ATIVAN) injection 1 mg  1 mg IntraVENous Q6H PRN  
 naloxone (NARCAN) injection 0.4 mg  0.4 mg IntraVENous EVERY 2 MINUTES AS NEEDED Data Review:  
 
LABS:  
Recent Results (from the past 12 hour(s)) CK Collection Time: 04/18/19  5:54 AM  
Result Value Ref Range  21 - 215 U/L  
PROTHROMBIN TIME + INR Collection Time: 04/18/19  5:54 AM  
Result Value Ref Range Prothrombin time 23.4 (H) 11.7 - 14.5 sec INR 2.1    
CBC WITH AUTOMATED DIFF Collection Time: 04/18/19  5:54 AM  
Result Value Ref Range WBC 20.4 (H) 4.3 - 11.1 K/uL  
 RBC 2.54 (L) 4.05 - 5.2 M/uL HGB 7.7 (L) 11.7 - 15.4 g/dL HCT 23.9 (L) 35.8 - 46.3 % MCV 94.1 79.6 - 97.8 FL  
 MCH 30.3 26.1 - 32.9 PG  
 MCHC 32.2 31.4 - 35.0 g/dL  
 RDW 15.9 (H) 11.9 - 14.6 % PLATELET 501 798 - 603 K/uL MPV 10.2 9.4 - 12.3 FL ABSOLUTE NRBC 0.00 0.0 - 0.2 K/uL NEUTROPHILS 77 43 - 78 % LYMPHOCYTES 8 (L) 13 - 44 % MONOCYTES 9 4.0 - 12.0 % EOSINOPHILS 0 (L) 0.5 - 7.8 % BASOPHILS 0 0.0 - 2.0 % IMMATURE GRANULOCYTES 6 (H) 0.0 - 5.0 %  
 ABS. NEUTROPHILS 15.8 (H) 1.7 - 8.2 K/UL  
 ABS. LYMPHOCYTES 1.6 0.5 - 4.6 K/UL  
 ABS. MONOCYTES 1.8 (H) 0.1 - 1.3 K/UL  
 ABS. EOSINOPHILS 0.0 0.0 - 0.8 K/UL  
 ABS. BASOPHILS 0.0 0.0 - 0.2 K/UL  
 ABS. IMM. GRANS. 1.2 (H) 0.0 - 0.5 K/UL  
 RBC COMMENTS ANISOCYTOSIS + POIKILOCYTOSIS    
 RBC COMMENTS HYPOCHROMIA    
 WBC COMMENTS Result Confirmed By Smear PLATELET COMMENTS ADEQUATE    
 DF AUTOMATED METABOLIC PANEL, COMPREHENSIVE Collection Time: 04/18/19  5:54 AM  
Result Value Ref Range Sodium 141 136 - 145 mmol/L Potassium 4.4 3.5 - 5.1 mmol/L Chloride 105 98 - 107 mmol/L  
 CO2 29 21 - 32 mmol/L Anion gap 7 7 - 16 mmol/L Glucose 145 (H) 65 - 100 mg/dL BUN 46 (H) 6 - 23 MG/DL Creatinine 4.35 (H) 0.6 - 1.0 MG/DL  
 GFR est AA 15 (L) >60 ml/min/1.73m2 GFR est non-AA 13 (L) >60 ml/min/1.73m2 Calcium 8.0 (L) 8.3 - 10.4 MG/DL Bilirubin, total 0.2 0.2 - 1.1 MG/DL  
 ALT (SGPT) 64 12 - 65 U/L  
 AST (SGOT) 28 15 - 37 U/L Alk. phosphatase 32 (L) 50 - 136 U/L Protein, total 5.0 (L) 6.3 - 8.2 g/dL Albumin 2.3 (L) 3.5 - 5.0 g/dL Globulin 2.7 2.3 - 3.5 g/dL A-G Ratio 0.9 (L) 1.2 - 3.5 Plan:  
 
Principal Problem: 
  Sepsis ([facility] (4/6/2019) Active Problems: 
  Hepatitis (4/6/2019) Substance use disorder (4/6/2019) Hip pain (4/6/2019) Hyponatremia (4/6/2019) CIRA (acute kidney injury) ([facility] (4/6/2019) UTI (urinary tract infection) (4/6/2019) Hypertension (4/6/2019) Rhabdomyolysis (4/7/2019) Asthma (5/19/2016) Overview: Last Assessment & Plan:  
    Albuterol prn Borderline personality disorder ([facility] (5/19/2016) Overview: Last Assessment & Plan:  
    Continue Prozac, Seroquel, and trazodone Encephalopathy, toxic (4/8/2019) Toxic metabolic encephalopathy (7/3/7944) CIRA with rhabdo-  ondialysis for support. First HD  was 4/10/2019 . Initially anuric. Today 950ml of urine. Her creatinine is lower than previous range. Either effective hemodialysis or the beginning of recovery Dialysis ongoing with fluid removal and clearance. Last tx yesterday. Had a discussion about options. She had a normal baseline and still has a chance for recovery. If no recovery, then she would like PD as her modality of choice. Check labs in AM, and decide on dialysis then

## 2019-04-18 NOTE — PROGRESS NOTES
Hospitalist Progress Note Admit Date:  2019  4:59 PM  
Name:  Jesus Fong Age:  33 y.o. 
:  1990 MRN:  [id number] PCP:  None Treatment Team: Attending Provider: Rajeev Coughlin MD; Consulting Provider: Gui Curiel MD; Care Manager: Opal Ramirez, RN; Utilization Review: Maame Paredes RN; Consulting Provider: Dino Mcgill MD; Consulting Provider: Arminda Raymond MD; Occupational Therapy Assistant: Russell Ayon 
 
HPI/Subjective:  
 
 
Ms. Carmen Dougherty is a 34 yo female with PMH of polysubstance use who is evaluated with malaise and bilateral hip pain on 19 and admitted with rhabdomyolysis, CIRA, RLE DVT, acute encephalopathy, UTI. She had UDS showing amphetamines, benzo, opiates  CT head negative. CXR negative. Rhabdomyolysis, likely was due to amphetamines, was managed with IVF without improvement and she has progressed to new dialysis start per nephrology. She has been seen by rheumatology/neurology, who recommends steroid taper for myositis. She continued to have LE pain, found to have right peroneal thrombus and has been managed with IV heparin to coumadin. She additionally has LLE pain/ decreased pulses with arterial duplex showing mild to moderate stenosis left femoral artery. She has been seen by vascular and no acute intervention needed. UTI treated with antibiotics, cx negative. BC NGTD. ECHO negative. CTAP showed pelvic fluid and possible PID- ID consulted. G/C negative. GYN evaluated due to vaginal bleeding and there are no acute needs. Plans are for home at discharge once stable. 19 sleeping, waiting on breakfast, no dyspnea, no BM changes, has edema and diffuse body pains, making urine into boyle Objective:  
 
Patient Vitals for the past 24 hrs: 
 Temp Pulse Resp BP SpO2  
19 1118 97.1 °F (36.2 °C) 98 16 142/85 98 % 19 0746 97.7 °F (36.5 °C) 100 17 124/80 97 % 04/18/19 0345 98.3 °F (36.8 °C) (!) 115 18 128/76 95 % 04/17/19 2350 98.2 °F (36.8 °C) (!) 114 19 135/81 98 % 04/17/19 2000 98.2 °F (36.8 °C) (!) 119 19 129/75 100 % 04/17/19 1614 97.7 °F (36.5 °C) (!) 101 18 140/82 96 % 04/17/19 1546    136/87   
04/17/19 1545    136/87   
04/17/19 1530  (!) 106  132/84   
04/17/19 1507  (!) 108  147/87   
04/17/19 1430  (!) 109  127/82   
04/17/19 1401  (!) 109  (!) 143/93   
04/17/19 1400  (!) 112  132/77   
04/17/19 1332  (!) 107  127/86   
04/17/19 1300  (!) 113  133/82   
04/17/19 1230  (!) 108  144/87  Oxygen Therapy O2 Sat (%): 98 % (04/18/19 1118) Pulse via Oximetry: 127 beats per minute (04/06/19 2119) O2 Device: Room air (04/18/19 2791) Intake/Output Summary (Last 24 hours) at 4/18/2019 1212 Last data filed at 4/18/2019 1156 Gross per 24 hour Intake 1410 ml Output 3570 ml Net -2160 ml  
   
*Note that automatically entered I/Os may not be accurate; dependent on patient compliance with collection and accurate  by [employer]. General:    Well nourished. Sleepy, no distress CV:   RRR. No murmur, rub, or gallop. 2+ edema Lungs:   CTAB. No wheezing, rhonchi, or rales. Anterior exam 
Abdomen:   Soft, slightly distended, nontender Skin:     No rashes or jaundice. Neuro:  No gross focal deficits Data Review: 
I have reviewed all labs, meds, and studies from the last 24 hours: 
 
Recent Results (from the past 24 hour(s)) CK Collection Time: 04/18/19  5:54 AM  
Result Value Ref Range  21 - 215 U/L  
PROTHROMBIN TIME + INR Collection Time: 04/18/19  5:54 AM  
Result Value Ref Range Prothrombin time 23.4 (H) 11.7 - 14.5 sec INR 2.1    
CBC WITH AUTOMATED DIFF Collection Time: 04/18/19  5:54 AM  
Result Value Ref Range WBC 20.4 (H) 4.3 - 11.1 K/uL  
 RBC 2.54 (L) 4.05 - 5.2 M/uL HGB 7.7 (L) 11.7 - 15.4 g/dL HCT 23.9 (L) 35.8 - 46.3 %  MCV 94.1 79.6 - 97.8 FL  
 MCH 30.3 26.1 - 32.9 PG  
 MCHC 32.2 31.4 - 35.0 g/dL  
 RDW 15.9 (H) 11.9 - 14.6 % PLATELET 140 959 - 712 K/uL MPV 10.2 9.4 - 12.3 FL ABSOLUTE NRBC 0.00 0.0 - 0.2 K/uL NEUTROPHILS 77 43 - 78 % LYMPHOCYTES 8 (L) 13 - 44 % MONOCYTES 9 4.0 - 12.0 % EOSINOPHILS 0 (L) 0.5 - 7.8 % BASOPHILS 0 0.0 - 2.0 % IMMATURE GRANULOCYTES 6 (H) 0.0 - 5.0 %  
 ABS. NEUTROPHILS 15.8 (H) 1.7 - 8.2 K/UL  
 ABS. LYMPHOCYTES 1.6 0.5 - 4.6 K/UL  
 ABS. MONOCYTES 1.8 (H) 0.1 - 1.3 K/UL  
 ABS. EOSINOPHILS 0.0 0.0 - 0.8 K/UL  
 ABS. BASOPHILS 0.0 0.0 - 0.2 K/UL  
 ABS. IMM. GRANS. 1.2 (H) 0.0 - 0.5 K/UL  
 RBC COMMENTS ANISOCYTOSIS + POIKILOCYTOSIS    
 RBC COMMENTS HYPOCHROMIA    
 WBC COMMENTS Result Confirmed By Smear PLATELET COMMENTS ADEQUATE    
 DF AUTOMATED METABOLIC PANEL, COMPREHENSIVE Collection Time: 04/18/19  5:54 AM  
Result Value Ref Range Sodium 141 136 - 145 mmol/L Potassium 4.4 3.5 - 5.1 mmol/L Chloride 105 98 - 107 mmol/L  
 CO2 29 21 - 32 mmol/L Anion gap 7 7 - 16 mmol/L Glucose 145 (H) 65 - 100 mg/dL BUN 46 (H) 6 - 23 MG/DL Creatinine 4.35 (H) 0.6 - 1.0 MG/DL  
 GFR est AA 15 (L) >60 ml/min/1.73m2 GFR est non-AA 13 (L) >60 ml/min/1.73m2 Calcium 8.0 (L) 8.3 - 10.4 MG/DL Bilirubin, total 0.2 0.2 - 1.1 MG/DL  
 ALT (SGPT) 64 12 - 65 U/L  
 AST (SGOT) 28 15 - 37 U/L Alk. phosphatase 32 (L) 50 - 136 U/L Protein, total 5.0 (L) 6.3 - 8.2 g/dL Albumin 2.3 (L) 3.5 - 5.0 g/dL Globulin 2.7 2.3 - 3.5 g/dL A-G Ratio 0.9 (L) 1.2 - 3.5 All Micro Results Procedure Component Value Units Date/Time CULTURE, BLOOD [[id number]] Collected:  04/07/19 2130 Order Status:  Completed Specimen:  Blood Updated:  04/13/19 6679 Special Requests: --     
  LEFT 
ARM Culture result: NO GROWTH 5 DAYS     
 CULTURE, BLOOD [[id number]] Collected:  04/06/19 2032 Order Status:  Completed Specimen:  Blood Updated:  04/11/19 6627 Special Requests: --     
  LEFT 
HAND Culture result: NO GROWTH 5 DAYS     
 CULTURE, URINE [[id number]] Collected:  19 1857 Order Status:  Completed Specimen:  Cath Urine Updated:  19 6940 Special Requests: NO SPECIAL REQUESTS Culture result: NO GROWTH 2 DAYS     
 CULTURE, URINE [[id number]] Collected:  19 Order Status:  Canceled Specimen:  Urine from Clean catch CULTURE, BLOOD [[id number]] Collected:  19 Order Status:  Canceled Specimen:  Blood Results for orders placed or performed during the hospital encounter of 19  
2D ECHO COMPLETE ADULT (TTE) W OR WO CONTR Narrative [facility] [address] [place], [address] [facility] 
[phone] Transthoracic Echocardiogram 
2D, M-mode, Doppler, and Color Doppler Patient: Alta Valera MR #: [id number] : 1990 Age: 33 years Gender: Female Study date: 2019 Account #: [de-identified] Height: 59 in 
Weight: 124.7 lb 
BSA: 1.51 mï¾² Status:Routine Location: 205 BP: 120/ 78 Allergies: PHENYTOIN SODIUM EXTENDED, LEVETIRACETAM 
 
Sonographer:  Danyelle Lizarraga RDCS Group:  [facility] Referring Physician:  Bg Valencia. Coco Hernandez MD 
Reading Physician:  Burgess Chavarria. Talon Mitchell MD [facility] INDICATIONS: PVD. *Per patient- unable to turn onto left side due to blood clot in leg. * PROCEDURE: This was a routine study. A transthoracic echocardiogram was 
performed. The study included complete 2D imaging, M-mode, complete spectral 
Doppler, and color Doppler. Echocardiographic views were limited by  
restricted 
patient mobility and poor acoustic window availability. Image quality was 
adequate. LEFT VENTRICLE: Size was normal. Systolic function was normal. Ejection 
fraction was estimated in the range of 55 % to 60 %. There were no regional 
wall motion abnormalities. Wall thickness was normal. The E/e' ratio was 6.2. Left ventricular diastolic function parameters were normal. 
 
RIGHT VENTRICLE: The size was normal. Systolic function was normal. The 
tricuspid jet envelope definition was inadequate for estimation of RV  
systolic 
pressure. LEFT ATRIUM: Size was normal. 
 
RIGHT ATRIUM: Size was normal. 
 
SYSTEMIC VEINS: IVC: The inferior vena cava was normal in size and course. AORTIC VALVE: The valve was structurally normal, tri-commissural. There was  
no 
evidence for stenosis. There was no insufficiency. MITRAL VALVE: Valve structure was normal. There was no evidence for stenosis. There was trivial regurgitation. TRICUSPID VALVE: The valve structure was normal. There was no evidence for 
stenosis. There was no regurgitation. PULMONIC VALVE: Not well visualized. There was no evidence for stenosis. There 
was trivial regurgitation. PERICARDIUM: There was no pericardial effusion. AORTA: The root exhibited normal size. SUMMARY: 
 
-  Left ventricle: Systolic function was normal. Ejection fraction was 
estimated in the range of 55 % to 60 %. There were no regional wall motion 
abnormalities. SYSTEM MEASUREMENT TABLES 
 
2D mode AoR Diam (2D): 2.5 cm 
LA Dimension (2D): 3 cm Left Atrium Systolic Volume Index; Method of Disks, Biplane; 2D mode;: 15.2 
ml/m2 IVS/LVPW (2D): 1.1 IVSd (2D): 1 cm LVIDd (2D): 3.8 cm LVIDs (2D): 2.5 cm 
LVOT Area (2D): 3.1 cm2 LVPWd (2D): 0.9 cm Tissue Doppler Imaging LV Peak Early Charles Tissue Win; Lateral MA (TDI): 17.2 cm/s LV Peak Early Charles Tissue Win; Medial MA (TDI): 8.2 cm/s Unspecified Scan Mode Peak Grad; Mean; Antegrade Flow: 8 mm[Hg] Vmax; Antegrade Flow: 134 cm/s LVOT Diam: 2 cm 
MV Peak Win/LV Peak Tissue Win E-Wave; Lateral MA: 4 MV Peak Win/LV Peak Tissue Win E-Wave; Medial MA: 8.4 Prepared and signed by 
 
Traci Wheatley. Sukumar Benoit MD [facility] Signed 08-Apr-2019 12:22:39 Current Meds: 
Current Facility-Administered Medications Medication Dose Route Frequency  methylPREDNISolone (PF) (SOLU-MEDROL) injection 20 mg  20 mg IntraVENous Q12H  ALPRAZolam (XANAX) tablet 1 mg  1 mg Oral BID  warfarin (COUMADIN) tablet 5 mg  5 mg Oral QPM  
 0.9% sodium chloride infusion 250 mL  250 mL IntraVENous PRN  
 morphine injection 2 mg  2 mg IntraVENous Q4H PRN  
 amLODIPine (NORVASC) tablet 10 mg  10 mg Oral DAILY  hydrALAZINE (APRESOLINE) 20 mg/mL injection 10 mg  10 mg IntraVENous Q6H PRN  
 diphenhydrAMINE (BENADRYL) capsule 25 mg  25 mg Oral DIALYSIS PRN  
 HYDROcodone-acetaminophen (NORCO)  mg tablet 1 Tab  1 Tab Oral Q6H PRN  
 diphenhydrAMINE (BENADRYL) capsule 25 mg  25 mg Oral Q6H PRN  thiamine HCL (B-1) tablet 100 mg  100 mg Oral DAILY  folic acid (FOLVITE) tablet 1 mg  1 mg Oral DAILY  sodium chloride (NS) flush 5-10 mL  5-10 mL IntraVENous PRN  
 ondansetron (ZOFRAN) injection 4 mg  4 mg IntraVENous Q6H PRN  
 nicotine (NICODERM CQ) 21 mg/24 hr patch 1 Patch  1 Patch TransDERmal DAILY  LORazepam (ATIVAN) injection 1 mg  1 mg IntraVENous Q6H PRN  
 naloxone (NARCAN) injection 0.4 mg  0.4 mg IntraVENous EVERY 2 MINUTES AS NEEDED Other Studies (last 24 hours): No results found. Assessment and Plan:  
 
Hospital Problems as of 4/18/2019 Date Reviewed: 4/7/2019 Codes Class Noted - Resolved POA Encephalopathy, toxic ICD-10-CM: G92 ICD-9-CM: 349.82  4/8/2019 - Present Unknown Toxic metabolic encephalopathy PGS-61-ME: T84 ICD-9-CM: 349.82  4/8/2019 - Present Unknown Rhabdomyolysis ICD-10-CM: A75.85 ICD-9-CM: 728.88  4/7/2019 - Present Yes * (Principal) Sepsis ([facility] ICD-10-CM: A41.9 ICD-9-CM: 038.9, 995.91  4/6/2019 - Present Yes Hepatitis ICD-10-CM: K75.9 ICD-9-CM: 573.3  4/6/2019 - Present Yes Substance use disorder (Chronic) ICD-10-CM: F19.90 ICD-9-CM: 305.90  4/6/2019 - Present Yes Hip pain ICD-10-CM: M25.559 ICD-9-CM: 719.45  4/6/2019 - Present Yes Hyponatremia ICD-10-CM: E87.1 ICD-9-CM: 276.1  4/6/2019 - Present Yes CIRA (acute kidney injury) ([facility] ICD-10-CM: N17.9 ICD-9-CM: 584.9  4/6/2019 - Present Yes  
   
 UTI (urinary tract infection) ICD-10-CM: N39.0 ICD-9-CM: 599.0  4/6/2019 - Present Yes Hypertension ICD-10-CM: I10 
ICD-9-CM: 401.9  4/6/2019 - Present Yes Asthma (Chronic) ICD-10-CM: J45.909 ICD-9-CM: 493.90  5/19/2016 - Present Yes Overview Signed 4/7/2019  7:19 AM by GARRET Altamirano Last Assessment & Plan:  
Albuterol prn Borderline personality disorder ([facility] (Chronic) ICD-10-CM: F60.3 ICD-9-CM: 301.83  5/19/2016 - Present Yes Overview Signed 4/7/2019  7:19 AM by GARRET Altamirano Last Assessment & Plan:  
Continue Prozac, Seroquel, and trazodone Plan: · CIRA/ATN/ rhabdomyolysis:  Dialysis per nephrology, on steroid taper · Polysubstance use: needs cessation, continued folate and thiamine, nicotine patch · UTI: treated · RLE DVT: on coumadin, pharmacy dosing, followup daily INR · Leukocytosis:  Partially steroid contribution, further infectious workup negative. · HTN: started norvasc, prn hydralazine · Toxic encephalopathy: resolved · Anemia: CBC currently stable DC planning/Dispo:  pending Diet:  DIET RENAL 
DVT ppx:  Coumadin, INR at goal  
 
Signed: Nalini Kelley MD

## 2019-04-18 NOTE — PROGRESS NOTES
Warfarin dosing per pharmacist 
 
Allen Raymundo is a 33 y.o. female. Height: 4' 11\" (149.9 cm)    Weight: 78.3 kg (172 lb 11.2 oz) Indication:  thrombosis R peroneal vein Goal INR:  2-3 Home dose:  New start Risk factors or significant drug interactions:  Vaginal bleeding Other anticoagulants:  none Daily Monitoring Date  INR     Warfarin dose HGB              Notes 4/8  1.0  ---  12.3  Hep gtt 
-----  
4/12  ---  5 mg  9.7  Hep gtt 4/13  1  5 mg  ---  Hep gtt 4/14  1.2  7 mg  8.3  Hep gtt 4/15  1.3  7 mg  8.0  Hep gtt 4/16  2.1  5 mg  7.0  Hep gtt 4/17  2.2  5 mg  7.9 
4/18  2.1  5 mg  7.7 Pharmacy consulted to dose and monitor warfarin for Ms. Hernandez with a R peroneal vein thrombosis. INR remains therapeutic today at 2.1. Will attempt to keep INR 2 to 2.5 for now to minimize bleeding risk. Will continue warfarin 5 mg daily. Continue daily INR. Thank you, Bebe Madrigal, PharmD, BCPS Clinical Pharmacist 
[phone]

## 2019-04-19 ENCOUNTER — APPOINTMENT (OUTPATIENT)
Dept: GENERAL RADIOLOGY | Age: 29
DRG: 871 | End: 2019-04-19
Attending: INTERNAL MEDICINE
Payer: SELF-PAY

## 2019-04-19 LAB
ALBUMIN SERPL-MCNC: 2.4 G/DL (ref 3.5–5)
ALBUMIN/GLOB SERPL: 0.9 {RATIO} (ref 1.2–3.5)
ALP SERPL-CCNC: 37 U/L (ref 50–136)
ALT SERPL-CCNC: 60 U/L (ref 12–65)
ANION GAP SERPL CALC-SCNC: 8 MMOL/L (ref 7–16)
APPEARANCE UR: CLEAR
AST SERPL-CCNC: 25 U/L (ref 15–37)
BACTERIA URNS QL MICRO: 0 /HPF
BASOPHILS # BLD: 0 K/UL (ref 0–0.2)
BASOPHILS NFR BLD: 0 % (ref 0–2)
BILIRUB SERPL-MCNC: 0.2 MG/DL (ref 0.2–1.1)
BILIRUB UR QL: NEGATIVE
BUN SERPL-MCNC: 69 MG/DL (ref 6–23)
CALCIUM SERPL-MCNC: 8.5 MG/DL (ref 8.3–10.4)
CASTS URNS QL MICRO: ABNORMAL /LPF
CHLORIDE SERPL-SCNC: 102 MMOL/L (ref 98–107)
CO2 SERPL-SCNC: 29 MMOL/L (ref 21–32)
COLOR UR: YELLOW
CREAT SERPL-MCNC: 5.23 MG/DL (ref 0.6–1)
DIFFERENTIAL METHOD BLD: ABNORMAL
EOSINOPHIL # BLD: 0 K/UL (ref 0–0.8)
EOSINOPHIL NFR BLD: 0 % (ref 0.5–7.8)
ERYTHROCYTE [DISTWIDTH] IN BLOOD BY AUTOMATED COUNT: 15.5 % (ref 11.9–14.6)
GLOBULIN SER CALC-MCNC: 2.8 G/DL (ref 2.3–3.5)
GLUCOSE SERPL-MCNC: 168 MG/DL (ref 65–100)
GLUCOSE UR STRIP.AUTO-MCNC: 250 MG/DL
HCT VFR BLD AUTO: 23.4 % (ref 35.8–46.3)
HGB BLD-MCNC: 7.7 G/DL (ref 11.7–15.4)
HGB UR QL STRIP: ABNORMAL
IMM GRANULOCYTES # BLD AUTO: 1 K/UL (ref 0–0.5)
IMM GRANULOCYTES NFR BLD AUTO: 4 % (ref 0–5)
INR PPP: 1.9
KETONES UR QL STRIP.AUTO: NEGATIVE MG/DL
LEUKOCYTE ESTERASE UR QL STRIP.AUTO: ABNORMAL
LYMPHOCYTES # BLD: 1.6 K/UL (ref 0.5–4.6)
LYMPHOCYTES NFR BLD: 6 % (ref 13–44)
MCH RBC QN AUTO: 30.8 PG (ref 26.1–32.9)
MCHC RBC AUTO-ENTMCNC: 32.9 G/DL (ref 31.4–35)
MCV RBC AUTO: 93.6 FL (ref 79.6–97.8)
MONOCYTES # BLD: 1.5 K/UL (ref 0.1–1.3)
MONOCYTES NFR BLD: 6 % (ref 4–12)
NEUTS SEG # BLD: 20.9 K/UL (ref 1.7–8.2)
NEUTS SEG NFR BLD: 84 % (ref 43–78)
NITRITE UR QL STRIP.AUTO: NEGATIVE
NRBC # BLD: 0 K/UL (ref 0–0.2)
PH UR STRIP: 7 [PH] (ref 5–9)
PLATELET # BLD AUTO: 376 K/UL (ref 150–450)
PMV BLD AUTO: 10 FL (ref 9.4–12.3)
POTASSIUM SERPL-SCNC: 4.6 MMOL/L (ref 3.5–5.1)
PROT SERPL-MCNC: 5.2 G/DL (ref 6.3–8.2)
PROT UR STRIP-MCNC: 30 MG/DL
PROTHROMBIN TIME: 21.5 SEC (ref 11.7–14.5)
RBC # BLD AUTO: 2.5 M/UL (ref 4.05–5.2)
RBC #/AREA URNS HPF: ABNORMAL /HPF
SODIUM SERPL-SCNC: 139 MMOL/L (ref 136–145)
SP GR UR REFRACTOMETRY: 1.01 (ref 1–1.02)
UROBILINOGEN UR QL STRIP.AUTO: 0.2 EU/DL (ref 0.2–1)
WBC # BLD AUTO: 24.9 K/UL (ref 4.3–11.1)
WBC URNS QL MICRO: ABNORMAL /HPF

## 2019-04-19 PROCEDURE — 74011250636 HC RX REV CODE- 250/636: Performed by: FAMILY MEDICINE

## 2019-04-19 PROCEDURE — 80053 COMPREHEN METABOLIC PANEL: CPT

## 2019-04-19 PROCEDURE — 90935 HEMODIALYSIS ONE EVALUATION: CPT

## 2019-04-19 PROCEDURE — 81001 URINALYSIS AUTO W/SCOPE: CPT

## 2019-04-19 PROCEDURE — 71045 X-RAY EXAM CHEST 1 VIEW: CPT

## 2019-04-19 PROCEDURE — 65270000029 HC RM PRIVATE

## 2019-04-19 PROCEDURE — 77030021907 HC KT URIN FOL O&M -A

## 2019-04-19 PROCEDURE — 85610 PROTHROMBIN TIME: CPT

## 2019-04-19 PROCEDURE — 74011250637 HC RX REV CODE- 250/637: Performed by: INTERNAL MEDICINE

## 2019-04-19 PROCEDURE — 74011250637 HC RX REV CODE- 250/637: Performed by: NURSE PRACTITIONER

## 2019-04-19 PROCEDURE — 85025 COMPLETE CBC W/AUTO DIFF WBC: CPT

## 2019-04-19 PROCEDURE — 74011250637 HC RX REV CODE- 250/637: Performed by: FAMILY MEDICINE

## 2019-04-19 PROCEDURE — 74011250636 HC RX REV CODE- 250/636: Performed by: INTERNAL MEDICINE

## 2019-04-19 RX ORDER — ALPRAZOLAM 0.5 MG/1
2 TABLET ORAL ONCE
Status: COMPLETED | OUTPATIENT
Start: 2019-04-19 | End: 2019-04-19

## 2019-04-19 RX ADMIN — MORPHINE SULFATE 2 MG: 2 INJECTION, SOLUTION INTRAMUSCULAR; INTRAVENOUS at 07:52

## 2019-04-19 RX ADMIN — ALPRAZOLAM 1 MG: 0.5 TABLET ORAL at 17:01

## 2019-04-19 RX ADMIN — AMLODIPINE BESYLATE 10 MG: 10 TABLET ORAL at 08:03

## 2019-04-19 RX ADMIN — Medication 100 MG: at 08:03

## 2019-04-19 RX ADMIN — HYDROCODONE BITARTRATE AND ACETAMINOPHEN 1 TABLET: 10; 325 TABLET ORAL at 10:08

## 2019-04-19 RX ADMIN — ALPRAZOLAM 1 MG: 0.5 TABLET ORAL at 08:03

## 2019-04-19 RX ADMIN — ALPRAZOLAM 2 MG: 0.5 TABLET ORAL at 13:53

## 2019-04-19 RX ADMIN — FOLIC ACID 1 MG: 1 TABLET ORAL at 08:03

## 2019-04-19 RX ADMIN — WARFARIN SODIUM 6 MG: 5 TABLET ORAL at 17:01

## 2019-04-19 RX ADMIN — METHYLPREDNISOLONE SODIUM SUCCINATE 10 MG: 40 INJECTION, POWDER, FOR SOLUTION INTRAMUSCULAR; INTRAVENOUS at 08:03

## 2019-04-19 RX ADMIN — MORPHINE SULFATE 2 MG: 2 INJECTION, SOLUTION INTRAMUSCULAR; INTRAVENOUS at 21:01

## 2019-04-19 RX ADMIN — MORPHINE SULFATE 2 MG: 2 INJECTION, SOLUTION INTRAMUSCULAR; INTRAVENOUS at 17:05

## 2019-04-19 RX ADMIN — MORPHINE SULFATE 2 MG: 2 INJECTION, SOLUTION INTRAMUSCULAR; INTRAVENOUS at 12:54

## 2019-04-19 RX ADMIN — METHYLPREDNISOLONE SODIUM SUCCINATE 10 MG: 40 INJECTION, POWDER, FOR SOLUTION INTRAMUSCULAR; INTRAVENOUS at 23:11

## 2019-04-19 NOTE — PROGRESS NOTES
Renal Progress Note Admission Date: 4/6/2019 Follow up CIRA Subjective:  
  
Patient seen and examined on HD, dialyzing via right TCC, 350 Qb, UF 3600, no complaints reports feeling better still with LE edema, UOP 1300 cc last 24 hours. ROS: 
General: no fever/chills, appetite ok Lung: no SOB 
CV; no CP 
GI; no N/V/D Ext: + BLE edema Objective:  
 
Physical Exam:   
Patient Vitals for the past 8 hrs: 
 BP Temp Pulse Resp SpO2 Weight 04/19/19 0833 139/84  (!) 102     
04/19/19 0720 131/87 98.1 °F (36.7 °C) (!) 110 17 98 %   
04/19/19 0504      82 kg (180 lb 11.2 oz) 04/19/19 0354 131/85 98 °F (36.7 °C) 95 17 96 %  Gen: comfortable , NAD HEENT: MMM 
CV: RRR, S1, S2, no rub Lungs: Clear bilaterally Abd: soft, non tender, + BS Extem: 2+ edema Current Facility-Administered Medications Medication Dose Route Frequency  methylPREDNISolone (PF) (SOLU-MEDROL) injection 10 mg  10 mg IntraVENous Q12H  ALPRAZolam (XANAX) tablet 1 mg  1 mg Oral BID  warfarin (COUMADIN) tablet 5 mg  5 mg Oral QPM  
 0.9% sodium chloride infusion 250 mL  250 mL IntraVENous PRN  
 morphine injection 2 mg  2 mg IntraVENous Q4H PRN  
 amLODIPine (NORVASC) tablet 10 mg  10 mg Oral DAILY  hydrALAZINE (APRESOLINE) 20 mg/mL injection 10 mg  10 mg IntraVENous Q6H PRN  
 diphenhydrAMINE (BENADRYL) capsule 25 mg  25 mg Oral DIALYSIS PRN  
 HYDROcodone-acetaminophen (NORCO)  mg tablet 1 Tab  1 Tab Oral Q6H PRN  
 diphenhydrAMINE (BENADRYL) capsule 25 mg  25 mg Oral Q6H PRN  thiamine HCL (B-1) tablet 100 mg  100 mg Oral DAILY  folic acid (FOLVITE) tablet 1 mg  1 mg Oral DAILY  sodium chloride (NS) flush 5-10 mL  5-10 mL IntraVENous PRN  
 ondansetron (ZOFRAN) injection 4 mg  4 mg IntraVENous Q6H PRN  
 nicotine (NICODERM CQ) 21 mg/24 hr patch 1 Patch  1 Patch TransDERmal DAILY  LORazepam (ATIVAN) injection 1 mg  1 mg IntraVENous Q6H PRN  
  naloxone (NARCAN) injection 0.4 mg  0.4 mg IntraVENous EVERY 2 MINUTES AS NEEDED Data Review:  
 
LABS:  
Recent Results (from the past 12 hour(s)) PROTHROMBIN TIME + INR Collection Time: 04/19/19  4:07 AM  
Result Value Ref Range Prothrombin time 21.5 (H) 11.7 - 14.5 sec INR 1.9    
CBC WITH AUTOMATED DIFF Collection Time: 04/19/19  4:07 AM  
Result Value Ref Range WBC 24.9 (H) 4.3 - 11.1 K/uL  
 RBC 2.50 (L) 4.05 - 5.2 M/uL HGB 7.7 (L) 11.7 - 15.4 g/dL HCT 23.4 (L) 35.8 - 46.3 % MCV 93.6 79.6 - 97.8 FL  
 MCH 30.8 26.1 - 32.9 PG  
 MCHC 32.9 31.4 - 35.0 g/dL  
 RDW 15.5 (H) 11.9 - 14.6 % PLATELET 516 789 - 495 K/uL MPV 10.0 9.4 - 12.3 FL ABSOLUTE NRBC 0.00 0.0 - 0.2 K/uL  
 DF AUTOMATED NEUTROPHILS 84 (H) 43 - 78 % LYMPHOCYTES 6 (L) 13 - 44 % MONOCYTES 6 4.0 - 12.0 % EOSINOPHILS 0 (L) 0.5 - 7.8 % BASOPHILS 0 0.0 - 2.0 % IMMATURE GRANULOCYTES 4 0.0 - 5.0 %  
 ABS. NEUTROPHILS 20.9 (H) 1.7 - 8.2 K/UL  
 ABS. LYMPHOCYTES 1.6 0.5 - 4.6 K/UL  
 ABS. MONOCYTES 1.5 (H) 0.1 - 1.3 K/UL  
 ABS. EOSINOPHILS 0.0 0.0 - 0.8 K/UL  
 ABS. BASOPHILS 0.0 0.0 - 0.2 K/UL  
 ABS. IMM. GRANS. 1.0 (H) 0.0 - 0.5 K/UL METABOLIC PANEL, COMPREHENSIVE Collection Time: 04/19/19  4:07 AM  
Result Value Ref Range Sodium 139 136 - 145 mmol/L Potassium 4.6 3.5 - 5.1 mmol/L Chloride 102 98 - 107 mmol/L  
 CO2 29 21 - 32 mmol/L Anion gap 8 7 - 16 mmol/L Glucose 168 (H) 65 - 100 mg/dL BUN 69 (H) 6 - 23 MG/DL Creatinine 5.23 (H) 0.6 - 1.0 MG/DL  
 GFR est AA 12 (L) >60 ml/min/1.73m2 GFR est non-AA 10 (L) >60 ml/min/1.73m2 Calcium 8.5 8.3 - 10.4 MG/DL Bilirubin, total 0.2 0.2 - 1.1 MG/DL  
 ALT (SGPT) 60 12 - 65 U/L  
 AST (SGOT) 25 15 - 37 U/L Alk. phosphatase 37 (L) 50 - 136 U/L Protein, total 5.2 (L) 6.3 - 8.2 g/dL Albumin 2.4 (L) 3.5 - 5.0 g/dL Globulin 2.8 2.3 - 3.5 g/dL A-G Ratio 0.9 (L) 1.2 - 3.5 Plan: Principal Problem: 
  Sepsis ([facility] (4/6/2019) Active Problems: 
  Hepatitis (4/6/2019) Substance use disorder (4/6/2019) Hip pain (4/6/2019) Hyponatremia (4/6/2019) CIRA (acute kidney injury) ([facility] (4/6/2019) UTI (urinary tract infection) (4/6/2019) Hypertension (4/6/2019) Rhabdomyolysis (4/7/2019) Asthma (5/19/2016) Overview: Last Assessment & Plan:  
    Albuterol prn Borderline personality disorder ([facility] (5/19/2016) Overview: Last Assessment & Plan:  
    Continue Prozac, Seroquel, and trazodone Encephalopathy, toxic (4/8/2019) Toxic metabolic encephalopathy (1/6/8369) CIRA with rhabdo-  ondialysis for support. First HD  was 4/10/2019 . Initially anuric making more urine 1300 cc last 24 hours, creatinine increased to 5.23 HD today for volume and clearance continue to monitor for renal recovery. 
-she would like to do PD if no renal recovery, consult CW Monday for outpatient slot if no renal recovery over the weekend. -UOP up so hopefully she will recovery

## 2019-04-19 NOTE — DIALYSIS
TRANSFER IN - DIALYSIS Received patient in dialysis unit  from [facility] (unit) for ordered procedure. Consent verified for renal replacement therapy. Patient alert and vital signs stable. /84 P 102 Hemodialysis initiated using Right CVC. Aspirated and flushed both ports without difficulty. Dressing clean, dry and intact. Machine settings per MD order. Heparin 0 unit bolus and 0 units/hr. Will monitor during treatment.

## 2019-04-19 NOTE — PROGRESS NOTES
Warfarin dosing per pharmacist 
 
Gussie Claude is a 33 y.o. female. Height: 4' 11\" (149.9 cm)    Weight: 82 kg (180 lb 11.2 oz) Indication:  thrombosis R peroneal vein Goal INR:  2-3 Home dose:  New start Risk factors or significant drug interactions:  Vaginal bleeding Other anticoagulants:  none Daily Monitoring Date  INR     Warfarin dose HGB              Notes 4/8  1.0  ---  12.3  Hep gtt 
-----  
4/12  ---  5 mg  9.7  Hep gtt 4/13  1  5 mg  ---  Hep gtt 4/14  1.2  7 mg  8.3  Hep gtt 4/15  1.3  7 mg  8.0  Hep gtt 4/16  2.1  5 mg  7.0  Hep gtt 4/17  2.2  5 mg  7.9 
4/18  2.1  5 mg  7.7 4/19  1.9  6 mg  7.7 Pharmacy consulted to dose and monitor warfarin for Ms. Hernandez with a R peroneal vein thrombosis. Will attempt to keep INR 2 to 2.5 for now to minimize bleeding risk. INR 1.9 today. Will increase dose to 6 mg qhs. Daily INR. Pharmacy will continue to follow. Please call with any questions. Thank you, Wally Chavez, PharmD Clinical Pharmacist 
[phone]

## 2019-04-19 NOTE — DIALYSIS
TRANSFER OUT- DIALYSIS Hemodialysis treatment completed without complications. Patient alert and VS stable  /83  P 72 3.0 Kgs removed. Flushed both ports with 10 mL of NS.  CVC dressing clean, dry, and intact, tego caps intact, bilateral lumens wrapped with 4x4 gauze. Patient to 205 after dialysis.

## 2019-04-19 NOTE — PROGRESS NOTES
END OF SHIFT NOTE: 
 
INTAKE/OUTPUT 
04/18 0701 - 04/19 0700 In: 480 [P.O.:480] Out: 1300 [Urine:1300] Voiding: YES Catheter: YES Drain:   
 
 
 
 
 
Flatus: Patient does have flatus present. Stool:  0 occurrences. Characteristics: 
Stool Assessment Stool Color: Lizzie Link Stool Appearance: Loose Stool Amount: Medium Stool Source/Status: Rectum Emesis: 0 occurrences. Characteristics: VITAL SIGNS Patient Vitals for the past 12 hrs: 
 Temp Pulse Resp BP SpO2  
04/19/19 0354 98 °F (36.7 °C) 95 17 131/85 96 % 04/18/19 2349 98.3 °F (36.8 °C) (!) 114 17 149/76 96 % 04/18/19 2011 97.9 °F (36.6 °C) (!) 106 19 (!) 132/93 98 % Pain Assessment Pain Intensity 1: 6 (04/19/19 0000) Pain Location 1: Leg 
Pain Intervention(s) 1: Rest 
Patient Stated Pain Goal: 0 Ambulating Yes in room Shift report given to oncoming nurse at the bedside.  
 
Óscar Martinez RN

## 2019-04-19 NOTE — PROGRESS NOTES
Dispo update:  Ms. Sarah Beth Coronaull of room 205. She has rhabdomyolysis likely related to amphetamine use, now with CIRA/ATN (but ESRD not documented).   
  
Ms. Hernandez does not have a payer source (\"self pay\"), but is reportedly pending [place] Medicaid. If she needs outpatient HD, will take some time to set that up. Will follow along and assist with discharge planning

## 2019-04-19 NOTE — PROGRESS NOTES
When PCT was in doing am vital signs, pt asked for morphine. This nurse in to administer medication. Found patient sleeping in no distress. Blood drawn from port and pt remained asleep would open eyes and go right back to sleep. Asked patient what pain level was and pt opened eyes and fell back to sleep. Morphine returned to Pyxis.

## 2019-04-19 NOTE — PROGRESS NOTES
PT note:  Patient is currently off the floor at dialysis. Will return as time permits.   Jv Daniel PTA

## 2019-04-19 NOTE — PROGRESS NOTES
Pt requesting for her xanax dose to be increased stating that her anxiety is not well managed. Ngozi Lane NP notified. Verbal order received for 2 mg po xanax once now.

## 2019-04-19 NOTE — PROGRESS NOTES
Rang to nursing station stating that she didn't want anyone to come in her room for the rest of the night. As she was telling this, her male friend was at the elevator and stated \"that's because I stopped the other paxton, he was trying to bring her something she didn't need\". This RN asked visitor if the other paxton was in her room and he said, No, I stopped him, She doesn't need what he was bringing\". This RN asked if the other paxton was trying to bring in drugs, he indicated yes with a nod. Asked visitor if patient had received drugs, he states,\"No I stopped him. \"Went to patients room and asked what was going on. She states,\"I don't want any one in my room tonight\". No visitor sign placed on door and security notified of patient's request for no visitors.

## 2019-04-20 ENCOUNTER — APPOINTMENT (OUTPATIENT)
Dept: GENERAL RADIOLOGY | Age: 29
DRG: 871 | End: 2019-04-20
Attending: INTERNAL MEDICINE
Payer: SELF-PAY

## 2019-04-20 ENCOUNTER — APPOINTMENT (OUTPATIENT)
Dept: NUCLEAR MEDICINE | Age: 29
DRG: 871 | End: 2019-04-20
Attending: INTERNAL MEDICINE
Payer: SELF-PAY

## 2019-04-20 LAB
ANION GAP SERPL CALC-SCNC: 7 MMOL/L (ref 7–16)
BASOPHILS # BLD: 0.1 K/UL (ref 0–0.2)
BASOPHILS NFR BLD: 0 % (ref 0–2)
BUN SERPL-MCNC: 49 MG/DL (ref 6–23)
CALCIUM SERPL-MCNC: 7.9 MG/DL (ref 8.3–10.4)
CHLORIDE SERPL-SCNC: 109 MMOL/L (ref 98–107)
CO2 SERPL-SCNC: 29 MMOL/L (ref 21–32)
CREAT SERPL-MCNC: 3.78 MG/DL (ref 0.6–1)
DIFFERENTIAL METHOD BLD: ABNORMAL
EOSINOPHIL # BLD: 0 K/UL (ref 0–0.8)
EOSINOPHIL NFR BLD: 0 % (ref 0.5–7.8)
ERYTHROCYTE [DISTWIDTH] IN BLOOD BY AUTOMATED COUNT: 15.8 % (ref 11.9–14.6)
GLUCOSE SERPL-MCNC: 123 MG/DL (ref 65–100)
HCT VFR BLD AUTO: 22.6 % (ref 35.8–46.3)
HGB BLD-MCNC: 7.5 G/DL (ref 11.7–15.4)
IMM GRANULOCYTES # BLD AUTO: 1.1 K/UL (ref 0–0.5)
IMM GRANULOCYTES NFR BLD AUTO: 4 % (ref 0–5)
INR PPP: 2.3
LYMPHOCYTES # BLD: 1.6 K/UL (ref 0.5–4.6)
LYMPHOCYTES NFR BLD: 6 % (ref 13–44)
MCH RBC QN AUTO: 32.2 PG (ref 26.1–32.9)
MCHC RBC AUTO-ENTMCNC: 33.2 G/DL (ref 31.4–35)
MCV RBC AUTO: 97 FL (ref 79.6–97.8)
MONOCYTES # BLD: 1.8 K/UL (ref 0.1–1.3)
MONOCYTES NFR BLD: 7 % (ref 4–12)
NEUTS SEG # BLD: 20.9 K/UL (ref 1.7–8.2)
NEUTS SEG NFR BLD: 82 % (ref 43–78)
NRBC # BLD: 0.02 K/UL (ref 0–0.2)
PLATELET # BLD AUTO: 442 K/UL (ref 150–450)
PMV BLD AUTO: 9.8 FL (ref 9.4–12.3)
POTASSIUM SERPL-SCNC: 4.7 MMOL/L (ref 3.5–5.1)
PROTHROMBIN TIME: 24.7 SEC (ref 11.7–14.5)
RBC # BLD AUTO: 2.33 M/UL (ref 4.05–5.2)
SODIUM SERPL-SCNC: 145 MMOL/L (ref 136–145)
WBC # BLD AUTO: 25.4 K/UL (ref 4.3–11.1)

## 2019-04-20 PROCEDURE — 74011250636 HC RX REV CODE- 250/636: Performed by: FAMILY MEDICINE

## 2019-04-20 PROCEDURE — 80048 BASIC METABOLIC PNL TOTAL CA: CPT

## 2019-04-20 PROCEDURE — 74011250636 HC RX REV CODE- 250/636: Performed by: INTERNAL MEDICINE

## 2019-04-20 PROCEDURE — 85025 COMPLETE CBC W/AUTO DIFF WBC: CPT

## 2019-04-20 PROCEDURE — 71046 X-RAY EXAM CHEST 2 VIEWS: CPT

## 2019-04-20 PROCEDURE — 65270000029 HC RM PRIVATE

## 2019-04-20 PROCEDURE — 74011250637 HC RX REV CODE- 250/637: Performed by: INTERNAL MEDICINE

## 2019-04-20 PROCEDURE — A9567 TECHNETIUM TC-99M AEROSOL: HCPCS

## 2019-04-20 PROCEDURE — 85610 PROTHROMBIN TIME: CPT

## 2019-04-20 PROCEDURE — 74011250637 HC RX REV CODE- 250/637: Performed by: FAMILY MEDICINE

## 2019-04-20 RX ORDER — SODIUM CHLORIDE 0.9 % (FLUSH) 0.9 %
10 SYRINGE (ML) INJECTION
Status: COMPLETED | OUTPATIENT
Start: 2019-04-20 | End: 2019-04-20

## 2019-04-20 RX ORDER — WARFARIN SODIUM 5 MG/1
5 TABLET ORAL EVERY EVENING
Status: DISCONTINUED | OUTPATIENT
Start: 2019-04-20 | End: 2019-04-22

## 2019-04-20 RX ADMIN — ALPRAZOLAM 1 MG: 0.5 TABLET ORAL at 08:14

## 2019-04-20 RX ADMIN — METHYLPREDNISOLONE SODIUM SUCCINATE 10 MG: 40 INJECTION, POWDER, FOR SOLUTION INTRAMUSCULAR; INTRAVENOUS at 21:24

## 2019-04-20 RX ADMIN — FOLIC ACID 1 MG: 1 TABLET ORAL at 08:14

## 2019-04-20 RX ADMIN — AMLODIPINE BESYLATE 10 MG: 10 TABLET ORAL at 08:14

## 2019-04-20 RX ADMIN — Medication 10 ML: at 13:38

## 2019-04-20 RX ADMIN — ALPRAZOLAM 1 MG: 0.5 TABLET ORAL at 17:05

## 2019-04-20 RX ADMIN — MORPHINE SULFATE 2 MG: 2 INJECTION, SOLUTION INTRAMUSCULAR; INTRAVENOUS at 08:14

## 2019-04-20 RX ADMIN — METHYLPREDNISOLONE SODIUM SUCCINATE 10 MG: 40 INJECTION, POWDER, FOR SOLUTION INTRAMUSCULAR; INTRAVENOUS at 08:13

## 2019-04-20 RX ADMIN — HYDROCODONE BITARTRATE AND ACETAMINOPHEN 1 TABLET: 10; 325 TABLET ORAL at 00:57

## 2019-04-20 RX ADMIN — MORPHINE SULFATE 2 MG: 2 INJECTION, SOLUTION INTRAMUSCULAR; INTRAVENOUS at 17:05

## 2019-04-20 RX ADMIN — MORPHINE SULFATE 2 MG: 2 INJECTION, SOLUTION INTRAMUSCULAR; INTRAVENOUS at 12:41

## 2019-04-20 RX ADMIN — MORPHINE SULFATE 2 MG: 2 INJECTION, SOLUTION INTRAMUSCULAR; INTRAVENOUS at 02:28

## 2019-04-20 RX ADMIN — WARFARIN SODIUM 5 MG: 5 TABLET ORAL at 17:05

## 2019-04-20 RX ADMIN — MORPHINE SULFATE 2 MG: 2 INJECTION, SOLUTION INTRAMUSCULAR; INTRAVENOUS at 21:24

## 2019-04-20 RX ADMIN — Medication 100 MG: at 08:14

## 2019-04-20 RX ADMIN — HYDROCODONE BITARTRATE AND ACETAMINOPHEN 1 TABLET: 10; 325 TABLET ORAL at 15:00

## 2019-04-20 NOTE — PROGRESS NOTES
END OF SHIFT NOTE: 
 
INTAKE/OUTPUT 
04/19 0701 - 04/20 0700 In: 240 [P.O.:240] Out: 9876 [EEMVO:0108] Voiding: YES Catheter: NO 
Drain:   
 
 
 
 
 
Flatus: Patient does have flatus present. Stool:  0 occurrences. Characteristics: 
Stool Assessment Stool Color: Jayson Left Stool Appearance: Loose Stool Amount: Medium Stool Source/Status: Rectum Emesis: 0 occurrences. Characteristics: VITAL SIGNS Patient Vitals for the past 12 hrs: 
 Temp Pulse Resp BP SpO2  
04/20/19 1537 97.8 °F (36.6 °C) (!) 119 18 125/77 100 % 04/20/19 1246 98.4 °F (36.9 °C) (!) 110 18 130/78 100 % 04/20/19 0750 98.9 °F (37.2 °C) (!) 118 18 133/89 99 % Pain Assessment Pain Intensity 1: 6 (04/20/19 1500) Pain Location 1: Generalized Pain Intervention(s) 1: Medication (see MAR) Patient Stated Pain Goal: 0 Ambulating Yes Shift report given to oncoming nurse at the bedside.  
 
Isela Woodward RN

## 2019-04-20 NOTE — PROGRESS NOTES
Hospitalist Progress Note Admit Date:  2019  4:59 PM  
Name:  Italo Doherty Age:  26 y.o. 
:  1990 MRN:  [id number] PCP:  None Treatment Team: Attending Provider: Juliana Easton MD; Consulting Provider: Medardo Falk MD; Care Manager: Chelsea Davalos, RN; Utilization Review: Amy Dawson RN; Consulting Provider: Anup Ryan MD; Consulting Provider: Donn Ingram MD 
 
Subjective: As previously documented: 
\"Ms. Hernandez is a 34 yo female with PMH of polysubstance use who is evaluated with malaise and bilateral hip pain on 19 and admitted with rhabdomyolysis, CIRA, RLE DVT, acute encephalopathy, UTI. She had UDS showing amphetamines, benzo, opiates  CT head negative. CXR negative. Rhabdomyolysis, likely was due to amphetamines, was managed with IVF without improvement and she has progressed to new dialysis start per nephrology. She has been seen by rheumatology/neurology, who recommends steroid taper for myositis. She continued to have LE pain, found to have right peroneal thrombus and has been managed with IV heparin to coumadin. She additionally had LLE pain/ decreased pulses with arterial duplex showing mild to moderate stenosis left femoral artery. She has been seen by vascular and no acute intervention needed. UTI treated with antibiotics, cx negative. BC NGTD. ECHO negative. CTAP showed pelvic fluid and possible PID- ID consulted. G/C negative. GYN evaluated due to vaginal bleeding and there are no acute needs. HIV and hepatitis are negative as well. Plans are for home at discharge once stable. \" 
 
 
Patient seen oob to cot. Not weighed today; Very sob with minimal activity. Wants boyle catheter removed-  
 
 
 
 
Objective:  
 
Patient Vitals for the past 24 hrs: 
 Temp Pulse Resp BP SpO2  
19 0750 98.9 °F (37.2 °C) (!) 118 18 133/89 99 % 19 2300 98 °F (36.7 °C) 60 19 124/73 96 % 04/19/19 1934 98.1 °F (36.7 °C) (!) 119 19 134/83 100 % 04/19/19 1413 98.1 °F (36.7 °C) (!) 104 19 128/77 97 % 04/19/19 1253 98 °F (36.7 °C) (!) 107 18 129/77 97 % 04/19/19 1158   (!) 100 123/73   
04/19/19 1130    128/76   
04/19/19 1102  97  130/78   
04/19/19 1035  (!) 105  117/70  Oxygen Therapy O2 Sat (%): 99 % (04/20/19 0750) Pulse via Oximetry: 127 beats per minute (04/06/19 2119) O2 Device: Room air (04/18/19 1502) Intake/Output Summary (Last 24 hours) at 4/20/2019 1002 Last data filed at 4/19/2019 2300 Gross per 24 hour Intake 240 ml Output 3750 ml Net -3510 ml General:    Well nourished. Alert. CV:   RRR. No murmur, rub, or gallop. Lungs:   CTAB. No wheezing, rhonchi, or rales. Abdomen:   Soft, nontender, nondistended. Extremities: Warm and dry. No cyanosis or edema. Skin:     No rashes or jaundice. Data Review: 
I have reviewed all labs, meds, telemetry events, and studies from the last 24 hours. Recent Results (from the past 24 hour(s)) URINALYSIS W/ RFLX MICROSCOPIC Collection Time: 04/19/19 12:58 PM  
Result Value Ref Range Color YELLOW Appearance CLEAR Specific gravity 1.014 1.001 - 1.023    
 pH (UA) 7.0 5.0 - 9.0 Protein 30 (A) NEG mg/dL Glucose 250 mg/dL Ketone NEGATIVE  NEG mg/dL Bilirubin NEGATIVE  NEG Blood MODERATE (A) NEG Urobilinogen 0.2 0.2 - 1.0 EU/dL Nitrites NEGATIVE  NEG Leukocyte Esterase SMALL (A) NEG    
 WBC 5-10 0 /hpf  
 RBC 0-3 0 /hpf Bacteria 0 0 /hpf Casts 0-3 0 /lpf PROTHROMBIN TIME + INR Collection Time: 04/20/19  5:56 AM  
Result Value Ref Range Prothrombin time 24.7 (H) 11.7 - 14.5 sec INR 2.3    
CBC WITH AUTOMATED DIFF Collection Time: 04/20/19  5:56 AM  
Result Value Ref Range WBC 25.4 (H) 4.3 - 11.1 K/uL  
 RBC 2.33 (L) 4.05 - 5.2 M/uL HGB 7.5 (L) 11.7 - 15.4 g/dL HCT 22.6 (L) 35.8 - 46.3 %  MCV 97.0 79.6 - 97.8 FL  
 MCH 32.2 26.1 - 32.9 PG  
 MCHC 33.2 31.4 - 35.0 g/dL  
 RDW 15.8 (H) 11.9 - 14.6 % PLATELET 938 588 - 846 K/uL MPV 9.8 9.4 - 12.3 FL ABSOLUTE NRBC 0.02 0.0 - 0.2 K/uL  
 DF AUTOMATED NEUTROPHILS 82 (H) 43 - 78 % LYMPHOCYTES 6 (L) 13 - 44 % MONOCYTES 7 4.0 - 12.0 % EOSINOPHILS 0 (L) 0.5 - 7.8 % BASOPHILS 0 0.0 - 2.0 % IMMATURE GRANULOCYTES 4 0.0 - 5.0 %  
 ABS. NEUTROPHILS 20.9 (H) 1.7 - 8.2 K/UL  
 ABS. LYMPHOCYTES 1.6 0.5 - 4.6 K/UL  
 ABS. MONOCYTES 1.8 (H) 0.1 - 1.3 K/UL  
 ABS. EOSINOPHILS 0.0 0.0 - 0.8 K/UL  
 ABS. BASOPHILS 0.1 0.0 - 0.2 K/UL  
 ABS. IMM. GRANS. 1.1 (H) 0.0 - 0.5 K/UL METABOLIC PANEL, BASIC Collection Time: 04/20/19  5:56 AM  
Result Value Ref Range Sodium 145 136 - 145 mmol/L Potassium 4.7 3.5 - 5.1 mmol/L Chloride 109 (H) 98 - 107 mmol/L  
 CO2 29 21 - 32 mmol/L Anion gap 7 7 - 16 mmol/L Glucose 123 (H) 65 - 100 mg/dL BUN 49 (H) 6 - 23 MG/DL Creatinine 3.78 (H) 0.6 - 1.0 MG/DL  
 GFR est AA 18 (L) >60 ml/min/1.73m2 GFR est non-AA 15 (L) >60 ml/min/1.73m2 Calcium 7.9 (L) 8.3 - 10.4 MG/DL All Micro Results Procedure Component Value Units Date/Time CULTURE, BLOOD [[id number]] Collected:  04/07/19 2130 Order Status:  Completed Specimen:  Blood Updated:  04/13/19 6841 Special Requests: --     
  LEFT 
ARM Culture result: NO GROWTH 5 DAYS     
 CULTURE, BLOOD [[id number]] Collected:  04/06/19 2032 Order Status:  Completed Specimen:  Blood Updated:  04/11/19 2609 Special Requests: --     
  LEFT 
HAND Culture result: NO GROWTH 5 DAYS     
 CULTURE, URINE [[id number]] Collected:  04/06/19 1857 Order Status:  Completed Specimen:  Cath Urine Updated:  04/09/19 3981 Special Requests: NO SPECIAL REQUESTS Culture result: NO GROWTH 2 DAYS     
 CULTURE, URINE [[id number]] Collected:  04/06/19 2145 Order Status:  Canceled Specimen:  Urine from Clean catch CULTURE, BLOOD [[id number]] Collected:  04/06/19 2000 Order Status:  Canceled Specimen:  Blood Current Meds: 
Current Facility-Administered Medications Medication Dose Route Frequency  warfarin (COUMADIN) tablet 6 mg  6 mg Oral QPM  
 methylPREDNISolone (PF) (SOLU-MEDROL) injection 10 mg  10 mg IntraVENous Q12H  ALPRAZolam (XANAX) tablet 1 mg  1 mg Oral BID  
 0.9% sodium chloride infusion 250 mL  250 mL IntraVENous PRN  
 morphine injection 2 mg  2 mg IntraVENous Q4H PRN  
 amLODIPine (NORVASC) tablet 10 mg  10 mg Oral DAILY  hydrALAZINE (APRESOLINE) 20 mg/mL injection 10 mg  10 mg IntraVENous Q6H PRN  
 diphenhydrAMINE (BENADRYL) capsule 25 mg  25 mg Oral DIALYSIS PRN  
 HYDROcodone-acetaminophen (NORCO)  mg tablet 1 Tab  1 Tab Oral Q6H PRN  
 diphenhydrAMINE (BENADRYL) capsule 25 mg  25 mg Oral Q6H PRN  thiamine HCL (B-1) tablet 100 mg  100 mg Oral DAILY  folic acid (FOLVITE) tablet 1 mg  1 mg Oral DAILY  sodium chloride (NS) flush 5-10 mL  5-10 mL IntraVENous PRN  
 ondansetron (ZOFRAN) injection 4 mg  4 mg IntraVENous Q6H PRN  
 nicotine (NICODERM CQ) 21 mg/24 hr patch 1 Patch  1 Patch TransDERmal DAILY  LORazepam (ATIVAN) injection 1 mg  1 mg IntraVENous Q6H PRN  
 naloxone (NARCAN) injection 0.4 mg  0.4 mg IntraVENous EVERY 2 MINUTES AS NEEDED Other Studies (last 24 hours): Xr Chest Sngl V Result Date: 4/19/2019 CHEST X-RAY, one view. HISTORY:  Leukocytosis. TECHNIQUE:  AP seated portable view COMPARISON: 6 April 2019 FINDINGS:  The lungs are clear. The heart size is normal. The costophrenic angles are sharp. The pulmonary vasculature is unremarkable. Included portion of the upper abdomen is unremarkable. Right-sided dialysis type catheter is present. IMPRESSION:  Negative for pneumonia. Assessment and Plan:  
 
Hospital Problems as of 4/20/2019 Date Reviewed: 4/7/2019 Codes Class Noted - Resolved POA Encephalopathy, toxic ICD-10-CM: G92 ICD-9-CM: 349.82  4/8/2019 - Present Unknown Toxic metabolic encephalopathy LUG-59-TE: Y58 ICD-9-CM: 349.82  4/8/2019 - Present Unknown Rhabdomyolysis ICD-10-CM: W96.91 ICD-9-CM: 728.88  4/7/2019 - Present Yes * (Principal) Sepsis ([facility] ICD-10-CM: A41.9 ICD-9-CM: 038.9, 995.91  4/6/2019 - Present Yes Hepatitis ICD-10-CM: K75.9 ICD-9-CM: 573.3  4/6/2019 - Present Yes Substance use disorder (Chronic) ICD-10-CM: F19.90 ICD-9-CM: 305.90  4/6/2019 - Present Yes Hip pain ICD-10-CM: M25.559 ICD-9-CM: 719.45  4/6/2019 - Present Yes Hyponatremia ICD-10-CM: E87.1 ICD-9-CM: 276.1  4/6/2019 - Present Yes CIRA (acute kidney injury) ([facility] ICD-10-CM: N17.9 ICD-9-CM: 584.9  4/6/2019 - Present Yes  
   
 UTI (urinary tract infection) ICD-10-CM: N39.0 ICD-9-CM: 599.0  4/6/2019 - Present Yes Hypertension ICD-10-CM: I10 
ICD-9-CM: 401.9  4/6/2019 - Present Yes Asthma (Chronic) ICD-10-CM: J45.909 ICD-9-CM: 493.90  5/19/2016 - Present Yes Overview Signed 4/7/2019  7:19 AM by GARRET Anders Last Assessment & Plan:  
Albuterol prn Borderline personality disorder ([facility] (Chronic) ICD-10-CM: F60.3 ICD-9-CM: 301.83  5/19/2016 - Present Yes Overview Signed 4/7/2019  7:19 AM by GARRET Anders Last Assessment & Plan:  
Continue Prozac, Seroquel, and trazodone PLAN:   
· CIRA/ATN/Rhabdomyolysis- per renal - pt getting HD but now showing some signs of kidney recovery with more than 1000 cc of urine put out; dc boyle- pt promises to urinate in potty hat and have accurate recordings. · She is hopeful to return to baseline renal function and not need dialysis- would opt for PD if still needed · Myositis -continue steroid taper · Polysubstance abuse advised to quit · uti- s/p abx · RLE DVT- on coumadin · Leukocytosis- likely secondary to steroids- infectious w/up Neg to date · Persistent tachycardia and sob - vq scan  
· HTN- continue current meds- BP better controlled · Anxiety- prn xanax · Anemia- hb stable · Further w/up and mgt based on her clinical course DC planning/Dispo:  Home when stable DVT ppx:  Coumadin Signed: 
Brianna Guevara MD

## 2019-04-20 NOTE — PROGRESS NOTES
Renal Progress Note Admission Date: 4/6/2019 Subjective:  
  
Feels ok. Today Objective:  
 
Physical Exam:   
Patient Vitals for the past 8 hrs: 
 BP Temp Pulse Resp SpO2  
04/20/19 0750 133/89 98.9 °F (37.2 °C) (!) 118 18 99 % Gen: comfortable , NAD HEENT: Dry membranes CV: S1, S2 
Lungs: Clear bilaterally Extem: 2+ edema Current Facility-Administered Medications Medication Dose Route Frequency  warfarin (COUMADIN) tablet 6 mg  6 mg Oral QPM  
 methylPREDNISolone (PF) (SOLU-MEDROL) injection 10 mg  10 mg IntraVENous Q12H  ALPRAZolam (XANAX) tablet 1 mg  1 mg Oral BID  
 0.9% sodium chloride infusion 250 mL  250 mL IntraVENous PRN  
 morphine injection 2 mg  2 mg IntraVENous Q4H PRN  
 amLODIPine (NORVASC) tablet 10 mg  10 mg Oral DAILY  hydrALAZINE (APRESOLINE) 20 mg/mL injection 10 mg  10 mg IntraVENous Q6H PRN  
 diphenhydrAMINE (BENADRYL) capsule 25 mg  25 mg Oral DIALYSIS PRN  
 HYDROcodone-acetaminophen (NORCO)  mg tablet 1 Tab  1 Tab Oral Q6H PRN  
 diphenhydrAMINE (BENADRYL) capsule 25 mg  25 mg Oral Q6H PRN  thiamine HCL (B-1) tablet 100 mg  100 mg Oral DAILY  folic acid (FOLVITE) tablet 1 mg  1 mg Oral DAILY  sodium chloride (NS) flush 5-10 mL  5-10 mL IntraVENous PRN  
 ondansetron (ZOFRAN) injection 4 mg  4 mg IntraVENous Q6H PRN  
 nicotine (NICODERM CQ) 21 mg/24 hr patch 1 Patch  1 Patch TransDERmal DAILY  LORazepam (ATIVAN) injection 1 mg  1 mg IntraVENous Q6H PRN  
 naloxone (NARCAN) injection 0.4 mg  0.4 mg IntraVENous EVERY 2 MINUTES AS NEEDED Data Review:  
 
LABS:  
Recent Results (from the past 12 hour(s)) PROTHROMBIN TIME + INR Collection Time: 04/20/19  5:56 AM  
Result Value Ref Range Prothrombin time 24.7 (H) 11.7 - 14.5 sec INR 2.3    
CBC WITH AUTOMATED DIFF Collection Time: 04/20/19  5:56 AM  
Result Value Ref Range WBC 25.4 (H) 4.3 - 11.1 K/uL  
 RBC 2.33 (L) 4.05 - 5.2 M/uL HGB 7.5 (L) 11.7 - 15.4 g/dL HCT 22.6 (L) 35.8 - 46.3 % MCV 97.0 79.6 - 97.8 FL  
 MCH 32.2 26.1 - 32.9 PG  
 MCHC 33.2 31.4 - 35.0 g/dL  
 RDW 15.8 (H) 11.9 - 14.6 % PLATELET 592 689 - 733 K/uL MPV 9.8 9.4 - 12.3 FL ABSOLUTE NRBC 0.02 0.0 - 0.2 K/uL  
 DF AUTOMATED NEUTROPHILS 82 (H) 43 - 78 % LYMPHOCYTES 6 (L) 13 - 44 % MONOCYTES 7 4.0 - 12.0 % EOSINOPHILS 0 (L) 0.5 - 7.8 % BASOPHILS 0 0.0 - 2.0 % IMMATURE GRANULOCYTES 4 0.0 - 5.0 %  
 ABS. NEUTROPHILS 20.9 (H) 1.7 - 8.2 K/UL  
 ABS. LYMPHOCYTES 1.6 0.5 - 4.6 K/UL  
 ABS. MONOCYTES 1.8 (H) 0.1 - 1.3 K/UL  
 ABS. EOSINOPHILS 0.0 0.0 - 0.8 K/UL  
 ABS. BASOPHILS 0.1 0.0 - 0.2 K/UL  
 ABS. IMM. GRANS. 1.1 (H) 0.0 - 0.5 K/UL METABOLIC PANEL, BASIC Collection Time: 04/20/19  5:56 AM  
Result Value Ref Range Sodium 145 136 - 145 mmol/L Potassium 4.7 3.5 - 5.1 mmol/L Chloride 109 (H) 98 - 107 mmol/L  
 CO2 29 21 - 32 mmol/L Anion gap 7 7 - 16 mmol/L Glucose 123 (H) 65 - 100 mg/dL BUN 49 (H) 6 - 23 MG/DL Creatinine 3.78 (H) 0.6 - 1.0 MG/DL  
 GFR est AA 18 (L) >60 ml/min/1.73m2 GFR est non-AA 15 (L) >60 ml/min/1.73m2 Calcium 7.9 (L) 8.3 - 10.4 MG/DL Plan:  
 
Principal Problem: 
  Sepsis ([address] (4/6/2019) Active Problems: 
  Hepatitis (4/6/2019) Substance use disorder (4/6/2019) Hip pain (4/6/2019) Hyponatremia (4/6/2019) CIRA (acute kidney [facility]) ([facility] (4/6/2019) UTI (urinary tract infection) (4/6/2019) Hypertension (4/6/2019) Rhabdomyolysis (4/7/2019) Asthma (5/19/2016) Overview: Last Assessment & Plan:  
    Albuterol prn Borderline personality disorder ([facility] (5/19/2016) Overview: Last Assessment & Plan:  
    Continue Prozac, Seroquel, and trazodone Encephalopathy, toxic (4/8/2019) Toxic metabolic encephalopathy (9/0/9156) CIRA with rhabdo-  On dialysis for support. First HD  was 4/10/2019 . Initially anuric. Today >1000ml of urine. Last dialysis was 4/19/ 
 
Had a discussion about options. She had a normal baseline and still has a chance for recovery. Encouraging she has more urine and overall creatinine is declining. Checking labs - hopefully seen recovery soon If no recovery, then she would like PD as her modality of choice.

## 2019-04-20 NOTE — PROGRESS NOTES
Warfarin dosing per pharmacist 
 
Vicky Montelongo is a 26 y.o. female. Height: 4' 11\" (149.9 cm)    Weight: 82 kg (180 lb 11.2 oz) Indication:  thrombosis R peroneal vein Goal INR:  2-3 Home dose:  New start Risk factors or significant drug interactions:  Vaginal bleeding Other anticoagulants:  none Daily Monitoring Date  INR     Warfarin dose HGB              Notes 4/8  1.0  ---  12.3  Hep gtt 
-----  
4/12  ---  5 mg  9.7  Hep gtt 4/13  1  5 mg  ---  Hep gtt 4/14  1.2  7 mg  8.3  Hep gtt 4/15  1.3  7 mg  8.0  Hep gtt 4/16  2.1  5 mg  7.0  Hep gtt 4/17  2.2  5 mg  7.9 
4/18  2.1  5 mg  7.7 4/19  1.9  6 mg  7.7 
4/20  2.3  5 mg  7.5 Pharmacy consulted to dose and monitor warfarin for Ms. Hernandez with a R peroneal vein thrombosis. Will attempt to keep INR 2 to 2.5 for now to minimize bleeding risk. INR trending up today to 2.3. Will reduce warfarin dose slightly to avoid overshooting. Will give 5 mg this evening. Continue to follow INR daily. Noted pt had a V/Q scan done today that showed low probability for PE. Pharmacy will continue to follow. Please call with any questions. Thank you, 
Shadi North, PharmD Clinical Pharmacist 
[phone]

## 2019-04-20 NOTE — PROGRESS NOTES
END OF SHIFT NOTE: 
 
INTAKE/OUTPUT 
04/19 0701 - 04/20 0700 In: 240 [P.O.:240] Out: 4167 [VEVAO:1721] Voiding: NO 
Catheter: YES Drain:   
 
 
 
 
 
Flatus: Patient does have flatus present. Stool:  0 occurrences. Characteristics: 
Stool Assessment Stool Color: Naomia Raimundo Stool Appearance: Loose Stool Amount: Medium Stool Source/Status: Rectum Emesis: 0 occurrences. Characteristics: VITAL SIGNS Patient Vitals for the past 12 hrs: 
 Temp Pulse Resp BP SpO2  
04/19/19 2300 98 °F (36.7 °C) 60 19 124/73 96 % 04/19/19 1934 98.1 °F (36.7 °C) (!) 119 19 134/83 100 % Pain Assessment Pain Intensity 1: 7 (04/20/19 0228) Pain Location 1: Leg 
Pain Intervention(s) 1: Medication (see MAR) Patient Stated Pain Goal: 0 Ambulating Yes Shift report given to oncoming nurse at the bedside.  
 
Holland Ellis RN

## 2019-04-21 LAB
ANION GAP SERPL CALC-SCNC: 7 MMOL/L (ref 7–16)
BASOPHILS # BLD: 0.1 K/UL (ref 0–0.2)
BASOPHILS NFR BLD: 0 % (ref 0–2)
BUN SERPL-MCNC: 61 MG/DL (ref 6–23)
CALCIUM SERPL-MCNC: 8.3 MG/DL (ref 8.3–10.4)
CHLORIDE SERPL-SCNC: 108 MMOL/L (ref 98–107)
CO2 SERPL-SCNC: 27 MMOL/L (ref 21–32)
CREAT SERPL-MCNC: 4.27 MG/DL (ref 0.6–1)
DIFFERENTIAL METHOD BLD: ABNORMAL
EOSINOPHIL # BLD: 0 K/UL (ref 0–0.8)
EOSINOPHIL NFR BLD: 0 % (ref 0.5–7.8)
ERYTHROCYTE [DISTWIDTH] IN BLOOD BY AUTOMATED COUNT: 15.9 % (ref 11.9–14.6)
GLUCOSE SERPL-MCNC: 131 MG/DL (ref 65–100)
HCT VFR BLD AUTO: 22.2 % (ref 35.8–46.3)
HGB BLD-MCNC: 7.2 G/DL (ref 11.7–15.4)
IMM GRANULOCYTES # BLD AUTO: 1 K/UL (ref 0–0.5)
IMM GRANULOCYTES NFR BLD AUTO: 4 % (ref 0–5)
INR PPP: 2.4
LYMPHOCYTES # BLD: 1.3 K/UL (ref 0.5–4.6)
LYMPHOCYTES NFR BLD: 5 % (ref 13–44)
MCH RBC QN AUTO: 31.4 PG (ref 26.1–32.9)
MCHC RBC AUTO-ENTMCNC: 32.4 G/DL (ref 31.4–35)
MCV RBC AUTO: 96.9 FL (ref 79.6–97.8)
MONOCYTES # BLD: 1.5 K/UL (ref 0.1–1.3)
MONOCYTES NFR BLD: 6 % (ref 4–12)
NEUTS SEG # BLD: 22.1 K/UL (ref 1.7–8.2)
NEUTS SEG NFR BLD: 86 % (ref 43–78)
NRBC # BLD: 0 K/UL (ref 0–0.2)
PLATELET # BLD AUTO: 449 K/UL (ref 150–450)
PMV BLD AUTO: 9.8 FL (ref 9.4–12.3)
POTASSIUM SERPL-SCNC: 4.7 MMOL/L (ref 3.5–5.1)
PROTHROMBIN TIME: 25.6 SEC (ref 11.7–14.5)
RBC # BLD AUTO: 2.29 M/UL (ref 4.05–5.2)
SODIUM SERPL-SCNC: 142 MMOL/L (ref 136–145)
WBC # BLD AUTO: 25.8 K/UL (ref 4.3–11.1)

## 2019-04-21 PROCEDURE — 74011250636 HC RX REV CODE- 250/636: Performed by: INTERNAL MEDICINE

## 2019-04-21 PROCEDURE — 74011250636 HC RX REV CODE- 250/636: Performed by: FAMILY MEDICINE

## 2019-04-21 PROCEDURE — 85025 COMPLETE CBC W/AUTO DIFF WBC: CPT

## 2019-04-21 PROCEDURE — 74011250637 HC RX REV CODE- 250/637: Performed by: INTERNAL MEDICINE

## 2019-04-21 PROCEDURE — 74011250637 HC RX REV CODE- 250/637: Performed by: FAMILY MEDICINE

## 2019-04-21 PROCEDURE — 80048 BASIC METABOLIC PNL TOTAL CA: CPT

## 2019-04-21 PROCEDURE — 65270000029 HC RM PRIVATE

## 2019-04-21 PROCEDURE — 85610 PROTHROMBIN TIME: CPT

## 2019-04-21 RX ORDER — PREDNISONE 20 MG/1
40 TABLET ORAL
Status: DISCONTINUED | OUTPATIENT
Start: 2019-04-22 | End: 2019-04-23

## 2019-04-21 RX ADMIN — FOLIC ACID 1 MG: 1 TABLET ORAL at 11:01

## 2019-04-21 RX ADMIN — METHYLPREDNISOLONE SODIUM SUCCINATE 10 MG: 40 INJECTION, POWDER, FOR SOLUTION INTRAMUSCULAR; INTRAVENOUS at 11:00

## 2019-04-21 RX ADMIN — HYDROCODONE BITARTRATE AND ACETAMINOPHEN 1 TABLET: 10; 325 TABLET ORAL at 17:54

## 2019-04-21 RX ADMIN — WARFARIN SODIUM 5 MG: 5 TABLET ORAL at 17:54

## 2019-04-21 RX ADMIN — ONDANSETRON 4 MG: 2 INJECTION INTRAMUSCULAR; INTRAVENOUS at 18:23

## 2019-04-21 RX ADMIN — MORPHINE SULFATE 2 MG: 2 INJECTION, SOLUTION INTRAMUSCULAR; INTRAVENOUS at 22:09

## 2019-04-21 RX ADMIN — MORPHINE SULFATE 2 MG: 2 INJECTION, SOLUTION INTRAMUSCULAR; INTRAVENOUS at 15:51

## 2019-04-21 RX ADMIN — MORPHINE SULFATE 2 MG: 2 INJECTION, SOLUTION INTRAMUSCULAR; INTRAVENOUS at 11:00

## 2019-04-21 RX ADMIN — HYDROCODONE BITARTRATE AND ACETAMINOPHEN 1 TABLET: 10; 325 TABLET ORAL at 04:06

## 2019-04-21 RX ADMIN — AMLODIPINE BESYLATE 10 MG: 10 TABLET ORAL at 11:01

## 2019-04-21 RX ADMIN — Medication 100 MG: at 11:01

## 2019-04-21 RX ADMIN — MORPHINE SULFATE 2 MG: 2 INJECTION, SOLUTION INTRAMUSCULAR; INTRAVENOUS at 01:20

## 2019-04-21 RX ADMIN — ALPRAZOLAM 1 MG: 0.5 TABLET ORAL at 17:54

## 2019-04-21 RX ADMIN — ALPRAZOLAM 1 MG: 0.5 TABLET ORAL at 11:01

## 2019-04-21 NOTE — PROGRESS NOTES
END OF SHIFT NOTE: 
 
INTAKE/OUTPUT 
04/20 0701 - 04/21 0700 In: -  
[facility] [address] [Urine:2375] Voiding: YES Catheter: NO 
Drain:   
 
 
 
 
 
Flatus: Patient does have flatus present. Stool:  1 occurrences. Characteristics: 
Stool Assessment Stool Color: Brigitte Ng Stool Appearance: Loose Stool Amount: Medium Stool Source/Status: Rectum Emesis: 0 occurrences. Characteristics: VITAL SIGNS Patient Vitals for the past 12 hrs: 
 Temp Pulse Resp BP SpO2  
04/21/19 1553 97.9 °F (36.6 °C) (!) 114 19 (!) 138/94 100 % 04/21/19 1107 97.8 °F (36.6 °C) (!) 111 16 134/84 100 % Pain Assessment Pain Intensity 1: 0 (04/21/19 1712) Pain Location 1: Abdomen, Back, Leg 
Pain Intervention(s) 1: Medication (see MAR) Patient Stated Pain Goal: 0 Ambulating Yes Shift report given to oncoming nurse at the bedside.  
 
Bernabe Tijerina RN

## 2019-04-21 NOTE — PROGRESS NOTES
END OF SHIFT NOTE: 
 
INTAKE/OUTPUT 
04/20 0701 - 04/21 0700 In: -  
Out: [address] [Urine:2375] Voiding: YES Catheter: NO 
Drain:   
 
 
 
 
 
Flatus: Patient does have flatus present. Stool:  0 occurrences. Characteristics: 
Stool Assessment Stool Color: Jayson Left Stool Appearance: Loose Stool Amount: Medium Stool Source/Status: Rectum Emesis: 0 occurrences. Characteristics: VITAL SIGNS Patient Vitals for the past 12 hrs: 
 Temp Pulse Resp BP SpO2  
04/21/19 0320 97.9 °F (36.6 °C) (!) 109 18 143/86 96 % 04/20/19 2357 98.5 °F (36.9 °C) (!) 116 18 149/86 100 % 04/20/19 1900 98 °F (36.7 °C) (!) 121 18 130/81 100 % Pain Assessment Pain Intensity 1: 7 (04/21/19 0120) Pain Location 1: Abdomen Pain Intervention(s) 1: Medication (see MAR) Patient Stated Pain Goal: 0 Ambulating Yes Shift report given to oncoming nurse at the bedside.  
 
Pollo Ortiz, RN

## 2019-04-21 NOTE — PROGRESS NOTES
Renal Progress Note Admission Date: 4/6/2019 Subjective:  
  
Feels ok. Today Objective:  
 
Physical Exam:   
Patient Vitals for the past 8 hrs: 
 BP Temp Pulse Resp SpO2  
04/21/19 0320 143/86 97.9 °F (36.6 °C) (!) 109 18 96 % Gen: comfortable , NAD HEENT: Dry membranes CV: S1, S2 
Lungs: Clear bilaterally Extem: 2+ edema Current Facility-Administered Medications Medication Dose Route Frequency  warfarin (COUMADIN) tablet 5 mg  5 mg Oral QPM  
 methylPREDNISolone (PF) (SOLU-MEDROL) injection 10 mg  10 mg IntraVENous Q12H  ALPRAZolam (XANAX) tablet 1 mg  1 mg Oral BID  
 0.9% sodium chloride infusion 250 mL  250 mL IntraVENous PRN  
 morphine injection 2 mg  2 mg IntraVENous Q4H PRN  
 amLODIPine (NORVASC) tablet 10 mg  10 mg Oral DAILY  hydrALAZINE (APRESOLINE) 20 mg/mL injection 10 mg  10 mg IntraVENous Q6H PRN  
 diphenhydrAMINE (BENADRYL) capsule 25 mg  25 mg Oral DIALYSIS PRN  
 HYDROcodone-acetaminophen (NORCO)  mg tablet 1 Tab  1 Tab Oral Q6H PRN  
 diphenhydrAMINE (BENADRYL) capsule 25 mg  25 mg Oral Q6H PRN  thiamine HCL (B-1) tablet 100 mg  100 mg Oral DAILY  folic acid (FOLVITE) tablet 1 mg  1 mg Oral DAILY  sodium chloride (NS) flush 5-10 mL  5-10 mL IntraVENous PRN  
 ondansetron (ZOFRAN) injection 4 mg  4 mg IntraVENous Q6H PRN  
 nicotine (NICODERM CQ) 21 mg/24 hr patch 1 Patch  1 Patch TransDERmal DAILY  LORazepam (ATIVAN) injection 1 mg  1 mg IntraVENous Q6H PRN  
 naloxone (NARCAN) injection 0.4 mg  0.4 mg IntraVENous EVERY 2 MINUTES AS NEEDED Data Review:  
 
LABS:  
Recent Results (from the past 12 hour(s)) PROTHROMBIN TIME + INR Collection Time: 04/21/19  4:06 AM  
Result Value Ref Range Prothrombin time 25.6 (H) 11.7 - 14.5 sec INR 2.4    
CBC WITH AUTOMATED DIFF Collection Time: 04/21/19  4:06 AM  
Result Value Ref Range WBC 25.8 (H) 4.3 - 11.1 K/uL  
 RBC 2.29 (L) 4.05 - 5.2 M/uL HGB 7.2 (L) 11.7 - 15.4 g/dL HCT 22.2 (L) 35.8 - 46.3 % MCV 96.9 79.6 - 97.8 FL  
 MCH 31.4 26.1 - 32.9 PG  
 MCHC 32.4 31.4 - 35.0 g/dL  
 RDW 15.9 (H) 11.9 - 14.6 % PLATELET 208 195 - 349 K/uL MPV 9.8 9.4 - 12.3 FL ABSOLUTE NRBC 0.00 0.0 - 0.2 K/uL  
 DF AUTOMATED NEUTROPHILS 86 (H) 43 - 78 % LYMPHOCYTES 5 (L) 13 - 44 % MONOCYTES 6 4.0 - 12.0 % EOSINOPHILS 0 (L) 0.5 - 7.8 % BASOPHILS 0 0.0 - 2.0 % IMMATURE GRANULOCYTES 4 0.0 - 5.0 %  
 ABS. NEUTROPHILS 22.1 (H) 1.7 - 8.2 K/UL  
 ABS. LYMPHOCYTES 1.3 0.5 - 4.6 K/UL  
 ABS. MONOCYTES 1.5 (H) 0.1 - 1.3 K/UL  
 ABS. EOSINOPHILS 0.0 0.0 - 0.8 K/UL  
 ABS. BASOPHILS 0.1 0.0 - 0.2 K/UL  
 ABS. IMM. GRANS. 1.0 (H) 0.0 - 0.5 K/UL METABOLIC PANEL, BASIC Collection Time: 04/21/19  4:06 AM  
Result Value Ref Range Sodium 142 136 - 145 mmol/L Potassium 4.7 3.5 - 5.1 mmol/L Chloride 108 (H) 98 - 107 mmol/L  
 CO2 27 21 - 32 mmol/L Anion gap 7 7 - 16 mmol/L Glucose 131 (H) 65 - 100 mg/dL BUN 61 (H) 6 - 23 MG/DL Creatinine 4.27 (H) 0.6 - 1.0 MG/DL  
 GFR est AA 16 (L) >60 ml/min/1.73m2 GFR est non-AA 13 (L) >60 ml/min/1.73m2 Calcium 8.3 8.3 - 10.4 MG/DL Plan:  
 
Principal Problem: 
  Sepsis ([address] (4/6/2019) Active Problems: 
  Hepatitis (4/6/2019) Substance use disorder (4/6/2019) Hip pain (4/6/2019) Hyponatremia (4/6/2019) CIRA ([facility] ([facility] (4/6/2019) UTI (urinary tract infection) (4/6/2019) Hypertension (4/6/2019) Rhabdomyolysis (4/7/2019) Asthma (5/19/2016) Overview: Last Assessment & Plan:  
    Albuterol prn Borderline personality disorder ([facility] (5/19/2016) Overview: Last Assessment & Plan:  
    Continue Prozac, Seroquel, and trazodone Encephalopathy, toxic (4/8/2019) Toxic metabolic encephalopathy (4/4/7204) CIRA with rhabdo(meth use)-  On dialysis for support.   First HD  was 4/10/2019 . Initially anuric. Urine has been increasing. Last 24 hours is >2 liters. Last dialysis was 4/19/19. Still hopeful for recovery as she had  normal baseline kidney function. Will check labs in AM, and schedule dialysis if needed. If no eventual  recovery, then she would like PD as her modality of choice for the long term.

## 2019-04-21 NOTE — PROGRESS NOTES
Warfarin dosing per pharmacist 
 
Nida Moreno is a 26 y.o. female. Height: 4' 11\" (149.9 cm)    Weight: 82 kg (180 lb 11.2 oz) Indication:  thrombosis R peroneal vein Goal INR:  2-3 Home dose:  New start Risk factors or significant drug interactions:  Vaginal bleeding Other anticoagulants:  none Daily Monitoring Date  INR     Warfarin dose HGB              Notes 4/8  1.0  ---  12.3  Hep gtt 
-----  
4/12  ---  5 mg  9.7  Hep gtt 4/13  1  5 mg  ---  Hep gtt 4/14  1.2  7 mg  8.3  Hep gtt 4/15  1.3  7 mg  8.0  Hep gtt 4/16  2.1  5 mg  7.0  Hep gtt 4/17  2.2  5 mg  7.9 
4/18  2.1  5 mg  7.7 4/19  1.9  6 mg  7.7 
4/20  2.3  5 mg  7.5 
4/21  2.4  5 mg  7.2 Pharmacy consulted to dose and monitor warfarin for Ms. Hernandez with a R peroneal vein thrombosis. Will attempt to keep INR 2 to 2.5 for now to minimize bleeding risk. INR trending up today slightly to 2.4. Will continue with 5 mg this evening. Continue to follow INR daily. Pharmacy will continue to follow. Please call with any questions. Thank you, 
Alonzo Phelps, PharmD Clinical Pharmacist 
[phone]

## 2019-04-21 NOTE — PROGRESS NOTES
Progress Note Patient: Brice Lam MRN: [id number]  SSN: [id number] YOB: 1990  Age: 26 y.o. Sex: female Admit Date: 4/6/2019 LOS: 15 days Subjective: F/U rhabdomyolysis, myositis, RLE DVT. \"Ms. Hernandez is a 34 yo female with PMH of polysubstance use who is evaluated with malaise and bilateral hip pain on 4-6-19 and admitted with rhabdomyolysis, CIRA, RLE DVT, acute encephalopathy, UTI. She had UDS showing amphetamines, benzo, opiates  CT head negative. CXR negative. Rhabdomyolysis, likely was due to amphetamines, was managed with IVF without improvement and she has progressed to new dialysis start per nephrology. She has been seen by rheumatology/neurology, who recommends steroid taper for myositis. She continued to have LE pain, found to have right peroneal thrombus and has been managed with IV heparin to coumadin. She additionally had LLE pain/ decreased pulses with arterial duplex showing mild to moderate stenosis left femoral artery. She has been seen by vascular and no acute intervention needed. UTI treated with antibiotics, cx negative. BC NGTD. ECHO negative. CTAP showed pelvic fluid and possible PID- ID consulted. G/C negative.  GYN evaluated due to vaginal bleeding and there are no acute needs. HIV and hepatitis are negative as well.  
Plans are for home at discharge once stable. \" No chest pain. Admits to SOB. States she feels as if her edema is getting worse. Able to ambulate but SOB very easily. Current Facility-Administered Medications Medication Dose Route Frequency  warfarin (COUMADIN) tablet 5 mg  5 mg Oral QPM  
 methylPREDNISolone (PF) (SOLU-MEDROL) injection 10 mg  10 mg IntraVENous Q12H  ALPRAZolam (XANAX) tablet 1 mg  1 mg Oral BID  
 0.9% sodium chloride infusion 250 mL  250 mL IntraVENous PRN  
 morphine injection 2 mg  2 mg IntraVENous Q4H PRN  
 amLODIPine (NORVASC) tablet 10 mg  10 mg Oral DAILY  hydrALAZINE (APRESOLINE) 20 mg/mL injection 10 mg  10 mg IntraVENous Q6H PRN  
 diphenhydrAMINE (BENADRYL) capsule 25 mg  25 mg Oral DIALYSIS PRN  
 HYDROcodone-acetaminophen (NORCO)  mg tablet 1 Tab  1 Tab Oral Q6H PRN  
 diphenhydrAMINE (BENADRYL) capsule 25 mg  25 mg Oral Q6H PRN  thiamine HCL (B-1) tablet 100 mg  100 mg Oral DAILY  folic acid (FOLVITE) tablet 1 mg  1 mg Oral DAILY  sodium chloride (NS) flush 5-10 mL  5-10 mL IntraVENous PRN  
 ondansetron (ZOFRAN) injection 4 mg  4 mg IntraVENous Q6H PRN  
 nicotine (NICODERM CQ) 21 mg/24 hr patch 1 Patch  1 Patch TransDERmal DAILY  LORazepam (ATIVAN) injection 1 mg  1 mg IntraVENous Q6H PRN  
 naloxone (NARCAN) injection 0.4 mg  0.4 mg IntraVENous EVERY 2 MINUTES AS NEEDED Objective:  
 
Vitals:  
 04/20/19 2357 04/21/19 0320 04/21/19 1107 04/21/19 1553 BP: 149/86 143/86 134/84 (!) 138/94 Pulse: (!) 116 (!) 109 (!) 111 (!) 114 Resp: 18 18 16 19 Temp: 98.5 °F (36.9 °C) 97.9 °F (36.6 °C) 97.8 °F (36.6 °C) 97.9 °F (36.6 °C) SpO2: 100% 96% 100% 100% Weight:      
Height:      
  
  
Intake and Output: 
Current Shift: 04/21 0701 - 04/21 1900 In: -  
Out: 700 [Urine:700] Last three shifts: 04/19 1901 - 04/21 0700 In: -  
Out: [facility] Physical Exam:  
General:  Alert, cooperative, no distress, appears stated age. Eyes:  Conjunctivae/corneas clear. PERRL, EOMs intact. Fundi benign Ears:  Normal TMs and external ear canals both ears. Nose: Nares normal. Septum midline. Mucosa normal. No drainage or sinus tenderness. Mouth/Throat: Lips, mucosa, and tongue normal. Teeth and gums normal.  
Neck:  no JVD. Back:   Symmetric, no curvature. ROM normal. No CVA tenderness. Lungs:   Limited breathe sounds that are clear bilaterally. Increased work of breathing noted when speaking. Heart:  Regular rate and rhythm, S1, S2 normal, no murmur, click, rub or gallop. Abdomen:   Soft, non-tender. Bowel sounds normal. No masses,  No organomegaly. Extremities: 3+ edema to LE bilaterally. 2+ edema to hands. Ambulating well. Pulses: 2+ and symmetric all extremities. Skin: Skin color, texture, turgor normal. No rashes or lesions Lymph nodes: Cervical, supraclavicular, and axillary nodes normal.  
Neurologic: CNII-XII intact. Normal strength, sensation and reflexes throughout. Lab/Data Review: 
 
Recent Results (from the past 24 hour(s)) PROTHROMBIN TIME + INR Collection Time: 04/21/19  4:06 AM  
Result Value Ref Range Prothrombin time 25.6 (H) 11.7 - 14.5 sec INR 2.4    
CBC WITH AUTOMATED DIFF Collection Time: 04/21/19  4:06 AM  
Result Value Ref Range WBC 25.8 (H) 4.3 - 11.1 K/uL  
 RBC 2.29 (L) 4.05 - 5.2 M/uL HGB 7.2 (L) 11.7 - 15.4 g/dL HCT 22.2 (L) 35.8 - 46.3 % MCV 96.9 79.6 - 97.8 FL  
 MCH 31.4 26.1 - 32.9 PG  
 MCHC 32.4 31.4 - 35.0 g/dL  
 RDW 15.9 (H) 11.9 - 14.6 % PLATELET 790 432 - 589 K/uL MPV 9.8 9.4 - 12.3 FL ABSOLUTE NRBC 0.00 0.0 - 0.2 K/uL  
 DF AUTOMATED NEUTROPHILS 86 (H) 43 - 78 % LYMPHOCYTES 5 (L) 13 - 44 % MONOCYTES 6 4.0 - 12.0 % EOSINOPHILS 0 (L) 0.5 - 7.8 % BASOPHILS 0 0.0 - 2.0 % IMMATURE GRANULOCYTES 4 0.0 - 5.0 %  
 ABS. NEUTROPHILS 22.1 (H) 1.7 - 8.2 K/UL  
 ABS. LYMPHOCYTES 1.3 0.5 - 4.6 K/UL  
 ABS. MONOCYTES 1.5 (H) 0.1 - 1.3 K/UL  
 ABS. EOSINOPHILS 0.0 0.0 - 0.8 K/UL  
 ABS. BASOPHILS 0.1 0.0 - 0.2 K/UL  
 ABS. IMM. GRANS. 1.0 (H) 0.0 - 0.5 K/UL METABOLIC PANEL, BASIC Collection Time: 04/21/19  4:06 AM  
Result Value Ref Range Sodium 142 136 - 145 mmol/L Potassium 4.7 3.5 - 5.1 mmol/L Chloride 108 (H) 98 - 107 mmol/L  
 CO2 27 21 - 32 mmol/L Anion gap 7 7 - 16 mmol/L Glucose 131 (H) 65 - 100 mg/dL BUN 61 (H) 6 - 23 MG/DL Creatinine 4.27 (H) 0.6 - 1.0 MG/DL  
 GFR est AA 16 (L) >60 ml/min/1.73m2 GFR est non-AA 13 (L) >60 ml/min/1.73m2 Calcium 8.3 8.3 - 10.4 MG/DL Assessment/ Plan:  
 
Principal Problem: 
  Sepsis ([facility] (4/6/2019) Active Problems: 
  Hepatitis (4/6/2019) Substance use disorder (4/6/2019) Hip pain (4/6/2019) Hyponatremia (4/6/2019) CIRA (acute kidney injury) ([facility] (4/6/2019) UTI (urinary tract infection) (4/6/2019) Hypertension (4/6/2019) Rhabdomyolysis (4/7/2019) Asthma (5/19/2016) Overview: Last Assessment & Plan:  
    Albuterol prn Borderline personality disorder ([facility] (5/19/2016) Overview: Last Assessment & Plan:  
    Continue Prozac, Seroquel, and trazodone Encephalopathy, toxic (4/8/2019) Toxic metabolic encephalopathy (4/0/3081) · CIRA/ATN/Rhabdomyolysis- per renal - pt getting HD but now showing some signs of kidney recovery with more than 1000 cc of urine put out. Still having edema with SOB so may benefit from more dialysis if nephrology agrees. · She is hopeful to return to baseline renal function and not need dialysis- would opt for PD if still needed · Myositis -continue steroid taper, change to oral prednisone starting tomorrow · Polysubstance abuse advised to quit · uti- s/p abx · RLE DVT- on coumadin. INR 2.4.  
 
· Leukocytosis- likely secondary to steroids- infectious w/up Neg to date · Persistent tachycardia and sob - vq scan showed low probability for PE 
 
· HTN- continue current meds- BP better controlled · Anxiety- xanax 1mg BID · Anemia- hb stable · Further w/up and mgt based on her clinical course DVT prophylaxis - SCDs. Warfarin Signed By: Russell Bermudez DO April 21, 2019

## 2019-04-22 ENCOUNTER — APPOINTMENT (OUTPATIENT)
Dept: GENERAL RADIOLOGY | Age: 29
DRG: 871 | End: 2019-04-22
Attending: INTERNAL MEDICINE
Payer: SELF-PAY

## 2019-04-22 LAB
ANION GAP SERPL CALC-SCNC: 7 MMOL/L (ref 7–16)
BASOPHILS # BLD: 0 K/UL (ref 0–0.2)
BASOPHILS NFR BLD: 0 % (ref 0–2)
BUN SERPL-MCNC: 71 MG/DL (ref 6–23)
CALCIUM SERPL-MCNC: 7.8 MG/DL (ref 8.3–10.4)
CHLORIDE SERPL-SCNC: 109 MMOL/L (ref 98–107)
CO2 SERPL-SCNC: 27 MMOL/L (ref 21–32)
CREAT SERPL-MCNC: 3.88 MG/DL (ref 0.6–1)
DIFFERENTIAL METHOD BLD: ABNORMAL
EOSINOPHIL # BLD: 0.2 K/UL (ref 0–0.8)
EOSINOPHIL NFR BLD: 1 % (ref 0.5–7.8)
ERYTHROCYTE [DISTWIDTH] IN BLOOD BY AUTOMATED COUNT: 15.9 % (ref 11.9–14.6)
GLUCOSE SERPL-MCNC: 88 MG/DL (ref 65–100)
HCT VFR BLD AUTO: 21.6 % (ref 35.8–46.3)
HGB BLD-MCNC: 6.8 G/DL (ref 11.7–15.4)
IMM GRANULOCYTES # BLD AUTO: 0.9 K/UL (ref 0–0.5)
IMM GRANULOCYTES NFR BLD AUTO: 3 % (ref 0–5)
INR PPP: 2.8
LYMPHOCYTES # BLD: 2.5 K/UL (ref 0.5–4.6)
LYMPHOCYTES NFR BLD: 9 % (ref 13–44)
MCH RBC QN AUTO: 30.6 PG (ref 26.1–32.9)
MCHC RBC AUTO-ENTMCNC: 31.5 G/DL (ref 31.4–35)
MCV RBC AUTO: 97.3 FL (ref 79.6–97.8)
MONOCYTES # BLD: 1.4 K/UL (ref 0.1–1.3)
MONOCYTES NFR BLD: 6 % (ref 4–12)
NEUTS SEG # BLD: 21.2 K/UL (ref 1.7–8.2)
NEUTS SEG NFR BLD: 81 % (ref 43–78)
NRBC # BLD: 0 K/UL (ref 0–0.2)
PLATELET # BLD AUTO: 477 K/UL (ref 150–450)
PMV BLD AUTO: 9.6 FL (ref 9.4–12.3)
POTASSIUM SERPL-SCNC: 4.8 MMOL/L (ref 3.5–5.1)
PROTHROMBIN TIME: 28.8 SEC (ref 11.7–14.5)
RBC # BLD AUTO: 2.22 M/UL (ref 4.05–5.2)
SODIUM SERPL-SCNC: 143 MMOL/L (ref 136–145)
WBC # BLD AUTO: 26.2 K/UL (ref 4.3–11.1)

## 2019-04-22 PROCEDURE — 74011250636 HC RX REV CODE- 250/636: Performed by: FAMILY MEDICINE

## 2019-04-22 PROCEDURE — 36415 COLL VENOUS BLD VENIPUNCTURE: CPT

## 2019-04-22 PROCEDURE — 80048 BASIC METABOLIC PNL TOTAL CA: CPT

## 2019-04-22 PROCEDURE — 74011250637 HC RX REV CODE- 250/637: Performed by: FAMILY MEDICINE

## 2019-04-22 PROCEDURE — 74011636637 HC RX REV CODE- 636/637: Performed by: FAMILY MEDICINE

## 2019-04-22 PROCEDURE — 36430 TRANSFUSION BLD/BLD COMPNT: CPT

## 2019-04-22 PROCEDURE — P9016 RBC LEUKOCYTES REDUCED: HCPCS

## 2019-04-22 PROCEDURE — 74011250637 HC RX REV CODE- 250/637: Performed by: INTERNAL MEDICINE

## 2019-04-22 PROCEDURE — 77030039270 HC TU BLD FLTR CARD -A

## 2019-04-22 PROCEDURE — 86923 COMPATIBILITY TEST ELECTRIC: CPT

## 2019-04-22 PROCEDURE — 71045 X-RAY EXAM CHEST 1 VIEW: CPT

## 2019-04-22 PROCEDURE — 85025 COMPLETE CBC W/AUTO DIFF WBC: CPT

## 2019-04-22 PROCEDURE — 85610 PROTHROMBIN TIME: CPT

## 2019-04-22 PROCEDURE — 65270000029 HC RM PRIVATE

## 2019-04-22 PROCEDURE — 86900 BLOOD TYPING SEROLOGIC ABO: CPT

## 2019-04-22 RX ORDER — SODIUM CHLORIDE 9 MG/ML
250 INJECTION, SOLUTION INTRAVENOUS AS NEEDED
Status: DISCONTINUED | OUTPATIENT
Start: 2019-04-22 | End: 2019-04-24 | Stop reason: HOSPADM

## 2019-04-22 RX ORDER — WARFARIN 1 MG/1
3 TABLET ORAL EVERY EVENING
Status: DISCONTINUED | OUTPATIENT
Start: 2019-04-22 | End: 2019-04-24 | Stop reason: HOSPADM

## 2019-04-22 RX ORDER — SERTRALINE HYDROCHLORIDE 50 MG/1
50 TABLET, FILM COATED ORAL EVERY EVENING
Status: DISCONTINUED | OUTPATIENT
Start: 2019-04-22 | End: 2019-04-24 | Stop reason: HOSPADM

## 2019-04-22 RX ADMIN — AMLODIPINE BESYLATE 10 MG: 10 TABLET ORAL at 09:50

## 2019-04-22 RX ADMIN — HYDROCODONE BITARTRATE AND ACETAMINOPHEN 1 TABLET: 10; 325 TABLET ORAL at 18:54

## 2019-04-22 RX ADMIN — PREDNISONE 40 MG: 20 TABLET ORAL at 09:50

## 2019-04-22 RX ADMIN — MORPHINE SULFATE 2 MG: 2 INJECTION, SOLUTION INTRAMUSCULAR; INTRAVENOUS at 21:17

## 2019-04-22 RX ADMIN — MORPHINE SULFATE 2 MG: 2 INJECTION, SOLUTION INTRAMUSCULAR; INTRAVENOUS at 03:07

## 2019-04-22 RX ADMIN — WARFARIN SODIUM 3 MG: 1 TABLET ORAL at 18:54

## 2019-04-22 RX ADMIN — HYDROCODONE BITARTRATE AND ACETAMINOPHEN 1 TABLET: 10; 325 TABLET ORAL at 03:28

## 2019-04-22 RX ADMIN — Medication 20 ML: at 20:20

## 2019-04-22 RX ADMIN — ALPRAZOLAM 1 MG: 0.5 TABLET ORAL at 18:54

## 2019-04-22 RX ADMIN — FOLIC ACID 1 MG: 1 TABLET ORAL at 09:50

## 2019-04-22 RX ADMIN — HYDROCODONE BITARTRATE AND ACETAMINOPHEN 1 TABLET: 10; 325 TABLET ORAL at 11:14

## 2019-04-22 RX ADMIN — MORPHINE SULFATE 2 MG: 2 INJECTION, SOLUTION INTRAMUSCULAR; INTRAVENOUS at 15:35

## 2019-04-22 RX ADMIN — MORPHINE SULFATE 2 MG: 2 INJECTION, SOLUTION INTRAMUSCULAR; INTRAVENOUS at 07:44

## 2019-04-22 RX ADMIN — Medication 10 ML: at 21:19

## 2019-04-22 RX ADMIN — Medication 100 MG: at 09:50

## 2019-04-22 RX ADMIN — ALPRAZOLAM 1 MG: 0.5 TABLET ORAL at 09:50

## 2019-04-22 RX ADMIN — SERTRALINE HYDROCHLORIDE 50 MG: 50 TABLET ORAL at 18:54

## 2019-04-22 NOTE — PROGRESS NOTES
END OF SHIFT NOTE: 
 
INTAKE/OUTPUT 
04/21 0701 - 04/22 0700 In: -  
Out: 0271 [[id number]] Voiding: YES Catheter: NO 
Drain:   
 
 
 
 
 
Flatus: Patient does have flatus present. Stool:  0 occurrences. Characteristics: 
Stool Assessment Stool Color: Deneise Hamilton Stool Appearance: Loose Stool Amount: Medium Stool Source/Status: Rectum Emesis: 0 occurrences. Characteristics: VITAL SIGNS Patient Vitals for the past 12 hrs: 
 Temp Pulse Resp BP SpO2  
04/22/19 1757 98.3 °F (36.8 °C) (!) 113 20 131/83 97 % 04/22/19 1743 98 °F (36.7 °C) (!) 108 20 132/81 96 % 04/22/19 1433 98.1 °F (36.7 °C) (!) 115 17 131/78 93 % 04/22/19 1432 98.1 °F (36.7 °C) (!) 115 19 131/78 93 % 04/22/19 1321 98 °F (36.7 °C) (!) 113 20 125/79 95 % 04/22/19 1301 98.2 °F (36.8 °C) (!) 118 22 120/75 92 % 04/22/19 0730 97.4 °F (36.3 °C) (!) 113 18 120/77 100 % Pain Assessment Pain Intensity 1: 6 (04/22/19 1855) Pain Location 1: Generalized Pain Intervention(s) 1: Medication (see MAR) Patient Stated Pain Goal: 0 Ambulating Yes Shift report given to oncoming nurse at the bedside. Alexandra Ascencio

## 2019-04-22 NOTE — PROGRESS NOTES
END OF SHIFT NOTE: 
 
INTAKE/OUTPUT 
04/21 0701 - 04/22 0700 In: -  
Out: 5876 [SNCLE:4342] Voiding: YES Catheter: NO 
Drain:   
 
 
 
 
 
Flatus: Patient does have flatus present. Stool:  0 occurrences. Characteristics: 
Stool Assessment Stool Color: Glendia Amel Stool Appearance: Loose Stool Amount: Medium Stool Source/Status: Rectum Emesis: 0 occurrences. Characteristics: VITAL SIGNS Patient Vitals for the past 12 hrs: 
 Temp Pulse Resp BP SpO2  
04/22/19 0306 97.9 °F (36.6 °C)  22 142/86   
04/21/19 2351 97.9 °F (36.6 °C) (!) 106 19 143/83 99 % 04/21/19 1927 97.8 °F (36.6 °C) (!) 116 19 117/77 96 % Pain Assessment Pain Intensity 1: 7 (04/22/19 0306) Pain Location 1: Abdomen Pain Intervention(s) 1: Medication (see MAR) Patient Stated Pain Goal: 0 Ambulating Yes Shift report given to oncoming nurse at the bedside.  
 
Miracle Thompson RN

## 2019-04-22 NOTE — PROGRESS NOTES
Ms. Kimberly Bruno with CIRA with rhabdo(meth use), on HD for support (first HD 4/10/2019). Urine has been increasing. If needs outpatient HD, will need payor source as she is \"self pay\" (no health insurance). Will continue to follow.

## 2019-04-22 NOTE — PROGRESS NOTES
Hospitalist Progress Note Admit Date:  2019  4:59 PM  
Name:  Marline Asher Age:  26 y.o. 
:  1990 MRN:  [id number] PCP:  None Treatment Team: Attending Provider: Willy Gilman MD; Consulting Provider: Octavia Mendoza MD; Care Manager: Munira Gilbert, RN; Utilization Review: Oren Rodriguez RN; Consulting Provider: Vicky Welch MD; Consulting Provider: Rodney Bird MD; Physical Therapist: Adell Snellen, DPT 
 
HPI/Subjective:  
 
 
Ms. Howie Malloy is a 34 yo female with PMH of polysubstance use who is evaluated with malaise and bilateral hip pain on 19 and admitted with rhabdomyolysis, CIRA, RLE DVT, acute encephalopathy, UTI. She had UDS showing amphetamines, benzo, opiates. CT head negative. CXR negative. Rhabdomyolysis, likely was due to amphetamines, was managed with IVF without improvement and she has progressed to new dialysis start per nephrology. She has been seen by rheumatology/neurology, who recommends steroid taper for myositis. She continued to have LE pain, found to have right peroneal thrombus and has been managed with IV heparin to coumadin. She additionally had LLE pain/ decreased pulses with arterial duplex showing mild to moderate stenosis left femoral artery. She has been seen by vascular and no acute intervention needed. V/Q scan low probability. UTI treated with antibiotics, cx negative. BC NGTD. ECHO negative. CTAP showed pelvic fluid and possible PID- ID consulted. G/C negative. GYN evaluated due to vaginal bleeding and there are no acute needs. HIV and hepatitis are negative as well. Plans are for home at discharge once stable. 19 has gained 50 pounds since admit of fluid, has ongoing persistent shortness of breath and diffuse body pains with significant anxiety, diet tolerant and no BM change, no further vaginal bleeding Objective:  
 
Patient Vitals for the past 24 hrs: 
 Temp Pulse Resp BP SpO2 04/22/19 0730 97.4 °F (36.3 °C) (!) 113 18 120/77 100 % 04/22/19 0306 97.9 °F (36.6 °C)  22 142/86   
04/21/19 2351 97.9 °F (36.6 °C) (!) 106 19 143/83 99 % 04/21/19 1927 97.8 °F (36.6 °C) (!) 116 19 117/77 96 % 04/21/19 1553 97.9 °F (36.6 °C) (!) 114 19 (!) 138/94 100 % Oxygen Therapy O2 Sat (%): 100 % (04/22/19 0730) Pulse via Oximetry: 127 beats per minute (04/06/19 2119) O2 Device: Room air (04/18/19 1502) Intake/Output Summary (Last 24 hours) at 4/22/2019 1224 Last data filed at 4/21/2019 2300 Gross per 24 hour Intake  Output 1350 ml Net -1350 ml *Note that automatically entered I/Os may not be accurate; dependent on patient compliance with collection and accurate  by [employer]. General:    Well nourished. alert, no distress CV:   RRR. No murmur, rub, or gallop. 2-3+ edema Lungs:   CTAB. No wheezing, rhonchi, or rales. Abdomen:   Soft,  distended, nontender, decreased BS Skin:     No rashes or jaundice. Neuro:  No gross focal deficits Data Review: 
I have reviewed all labs, meds, and studies from the last 24 hours: 
 
Recent Results (from the past 24 hour(s)) PROTHROMBIN TIME + INR Collection Time: 04/22/19  5:37 AM  
Result Value Ref Range Prothrombin time 28.8 (H) 11.7 - 14.5 sec INR 2.8    
CBC WITH AUTOMATED DIFF Collection Time: 04/22/19  5:37 AM  
Result Value Ref Range WBC 26.2 (H) 4.3 - 11.1 K/uL  
 RBC 2.22 (L) 4.05 - 5.2 M/uL HGB 6.8 (LL) 11.7 - 15.4 g/dL HCT 21.6 (L) 35.8 - 46.3 % MCV 97.3 79.6 - 97.8 FL  
 MCH 30.6 26.1 - 32.9 PG  
 MCHC 31.5 31.4 - 35.0 g/dL  
 RDW 15.9 (H) 11.9 - 14.6 % PLATELET 137 (H) 862 - 450 K/uL MPV 9.6 9.4 - 12.3 FL ABSOLUTE NRBC 0.00 0.0 - 0.2 K/uL  
 DF AUTOMATED NEUTROPHILS 81 (H) 43 - 78 % LYMPHOCYTES 9 (L) 13 - 44 % MONOCYTES 6 4.0 - 12.0 % EOSINOPHILS 1 0.5 - 7.8 % BASOPHILS 0 0.0 - 2.0 % IMMATURE GRANULOCYTES 3 0.0 - 5.0 % ABS. NEUTROPHILS 21.2 (H) 1.7 - 8.2 K/UL  
 ABS. LYMPHOCYTES 2.5 0.5 - 4.6 K/UL  
 ABS. MONOCYTES 1.4 (H) 0.1 - 1.3 K/UL  
 ABS. EOSINOPHILS 0.2 0.0 - 0.8 K/UL  
 ABS. BASOPHILS 0.0 0.0 - 0.2 K/UL  
 ABS. IMM. GRANS. 0.9 (H) 0.0 - 0.5 K/UL METABOLIC PANEL, BASIC Collection Time: 04/22/19  5:37 AM  
Result Value Ref Range Sodium 143 136 - 145 mmol/L Potassium 4.8 3.5 - 5.1 mmol/L Chloride 109 (H) 98 - 107 mmol/L  
 CO2 27 21 - 32 mmol/L Anion gap 7 7 - 16 mmol/L Glucose 88 65 - 100 mg/dL BUN 71 (H) 6 - 23 MG/DL Creatinine 3.88 (H) 0.6 - 1.0 MG/DL  
 GFR est AA 18 (L) >60 ml/min/1.73m2 GFR est non-AA 14 (L) >60 ml/min/1.73m2 Calcium 7.8 (L) 8.3 - 10.4 MG/DL  
TYPE + CROSSMATCH Collection Time: 04/22/19  9:41 AM  
Result Value Ref Range Crossmatch Expiration 04/25/2019 ABO/Rh(D) AB NEGATIVE Antibody screen NEG Unit number [id number] Blood component type [facility] Unit division 00 Status of unit ALLOCATED Crossmatch result Compatible All Micro Results Procedure Component Value Units Date/Time CULTURE, BLOOD [[id number]] Collected:  04/07/19 2130 Order Status:  Completed Specimen:  Blood Updated:  04/13/19 8551 Special Requests: --     
  LEFT 
ARM Culture result: NO GROWTH 5 DAYS     
 CULTURE, BLOOD [[id number]] Collected:  04/06/19 2032 Order Status:  Completed Specimen:  Blood Updated:  04/11/19 7397 Special Requests: --     
  LEFT 
HAND Culture result: NO GROWTH 5 DAYS     
 CULTURE, URINE [[id number]] Collected:  04/06/19 1857 Order Status:  Completed Specimen:  Cath Urine Updated:  04/09/19 8760 Special Requests: NO SPECIAL REQUESTS Culture result: NO GROWTH 2 DAYS     
 CULTURE, URINE [[id number]] Collected:  04/06/19 2145 Order Status:  Canceled Specimen:  Urine from Clean catch CULTURE, BLOOD [[id number]] Collected:  04/06/19 2000 Order Status:  Canceled Specimen:  Blood Results for orders placed or performed during the hospital encounter of 19  
2D ECHO COMPLETE ADULT (TTE) W OR WO CONTR Narrative [facility] [address] [place], [address] [facility] 
[phone] Transthoracic Echocardiogram 
2D, M-mode, Doppler, and Color Doppler Patient: Raoul Patel MR #: [id number] : 1990 Age: 33 years Gender: Female Study date: 2019 Account #: [de-identified] Height: 59 in 
Weight: 124.7 lb 
BSA: 1.51 mï¾² Status:Routine Location: 205 BP: 120/ 78 Allergies: PHENYTOIN SODIUM EXTENDED, LEVETIRACETAM 
 
Sonographer:  Guicho Cuevas [facility] Group:  [facility] Referring Physician:  Vee Ram. Singh Reyes MD 
Reading Physician:  Hallie Huynh. Agustin Knox MD [facility] INDICATIONS: PVD. *Per patient- unable to turn onto left side due to blood clot in leg. * PROCEDURE: This was a routine study. A transthoracic echocardiogram was 
performed. The study included complete 2D imaging, M-mode, complete spectral 
Doppler, and color Doppler. Echocardiographic views were limited by  
restricted 
patient mobility and poor acoustic window availability. Image quality was 
adequate. LEFT VENTRICLE: Size was normal. Systolic function was normal. Ejection 
fraction was estimated in the range of 55 % to 60 %. There were no regional 
wall motion abnormalities. Wall thickness was normal. The E/e' ratio was 6.2. Left ventricular diastolic function parameters were normal. 
 
RIGHT VENTRICLE: The size was normal. Systolic function was normal. The 
tricuspid jet envelope definition was inadequate for estimation of RV  
systolic 
pressure. LEFT ATRIUM: Size was normal. 
 
RIGHT ATRIUM: Size was normal. 
 
SYSTEMIC VEINS: IVC: The inferior vena cava was normal in size and course. AORTIC VALVE: The valve was structurally normal, tri-commissural. There was  
no 
evidence for stenosis. There was no insufficiency. MITRAL VALVE: Valve structure was normal. There was no evidence for stenosis. There was trivial regurgitation. TRICUSPID VALVE: The valve structure was normal. There was no evidence for 
stenosis. There was no regurgitation. PULMONIC VALVE: Not well visualized. There was no evidence for stenosis. There 
was trivial regurgitation. PERICARDIUM: There was no pericardial effusion. AORTA: The root exhibited normal size. SUMMARY: 
 
-  Left ventricle: Systolic function was normal. Ejection fraction was 
estimated in the range of 55 % to 60 %. There were no regional wall motion 
abnormalities. SYSTEM MEASUREMENT TABLES 
 
2D mode AoR Diam (2D): 2.5 cm 
LA Dimension (2D): 3 cm Left Atrium Systolic Volume Index; Method of Disks, Biplane; 2D mode;: 15.2 
ml/m2 IVS/LVPW (2D): 1.1 IVSd (2D): 1 cm LVIDd (2D): 3.8 cm LVIDs (2D): 2.5 cm 
LVOT Area (2D): 3.1 cm2 LVPWd (2D): 0.9 cm Tissue Doppler Imaging LV Peak Early Charles Tissue Win; Lateral MA (TDI): 17.2 cm/s LV Peak Early Charles Tissue Win; Medial MA (TDI): 8.2 cm/s Unspecified Scan Mode Peak Grad; Mean; Antegrade Flow: 8 mm[Hg] Vmax; Antegrade Flow: 134 cm/s LVOT Diam: 2 cm 
MV Peak Win/LV Peak Tissue Win E-Wave; Lateral MA: 4 MV Peak Win/LV Peak Tissue Win E-Wave; Medial MA: 8.4 Prepared and signed by 
 
Dheeraj Hernandez. Fred Ireland MD [facility] Signed 08-Apr-2019 12:22:39 Current Meds: 
Current Facility-Administered Medications Medication Dose Route Frequency  0.9% sodium chloride infusion 250 mL  250 mL IntraVENous PRN  
 0.9% sodium chloride infusion 250 mL  250 mL IntraVENous PRN  predniSONE (DELTASONE) tablet 40 mg  40 mg Oral DAILY WITH BREAKFAST  warfarin (COUMADIN) tablet 5 mg  5 mg Oral QPM  
 ALPRAZolam (XANAX) tablet 1 mg  1 mg Oral BID  
 0.9% sodium chloride infusion 250 mL  250 mL IntraVENous PRN  
 morphine injection 2 mg  2 mg IntraVENous Q4H PRN  
 amLODIPine (NORVASC) tablet 10 mg  10 mg Oral DAILY  hydrALAZINE (APRESOLINE) 20 mg/mL injection 10 mg  10 mg IntraVENous Q6H PRN  
 diphenhydrAMINE (BENADRYL) capsule 25 mg  25 mg Oral DIALYSIS PRN  
 HYDROcodone-acetaminophen (NORCO)  mg tablet 1 Tab  1 Tab Oral Q6H PRN  
 diphenhydrAMINE (BENADRYL) capsule 25 mg  25 mg Oral Q6H PRN  thiamine HCL (B-1) tablet 100 mg  100 mg Oral DAILY  folic acid (FOLVITE) tablet 1 mg  1 mg Oral DAILY  sodium chloride (NS) flush 5-10 mL  5-10 mL IntraVENous PRN  
 ondansetron (ZOFRAN) injection 4 mg  4 mg IntraVENous Q6H PRN  
 LORazepam (ATIVAN) injection 1 mg  1 mg IntraVENous Q6H PRN  
 naloxone (NARCAN) injection 0.4 mg  0.4 mg IntraVENous EVERY 2 MINUTES AS NEEDED Other Studies (last 24 hours): No results found. Assessment and Plan:  
 
Hospital Problems as of 4/22/2019 Date Reviewed: 4/7/2019 Codes Class Noted - Resolved POA Encephalopathy, toxic ICD-10-CM: G92 ICD-9-CM: 349.82  4/8/2019 - Present Unknown Toxic metabolic encephalopathy [id number]: T49 ICD-9-CM: 349.82  4/8/2019 - Present Unknown Rhabdomyolysis ICD-10-CM: H58.19 ICD-9-CM: 728.88  4/7/2019 - Present Yes * (Principal) Sepsis ([facility] ICD-10-CM: A41.9 ICD-9-CM: 038.9, 995.91  4/6/2019 - Present Yes Hepatitis ICD-10-CM: K75.9 ICD-9-CM: 573.3  4/6/2019 - Present Yes Substance use disorder (Chronic) ICD-10-CM: F19.90 ICD-9-CM: 305.90  4/6/2019 - Present Yes Hip pain ICD-10-CM: M25.559 ICD-9-CM: 719.45  4/6/2019 - Present Yes Hyponatremia ICD-10-CM: E87.1 ICD-9-CM: 276.1  4/6/2019 - Present Yes CIRA (acute kidney injury) ([facility] ICD-10-CM: N17.9 ICD-9-CM: 584.9  4/6/2019 - Present Yes  
   
 UTI (urinary tract infection) ICD-10-CM: N39.0 ICD-9-CM: 599.0  4/6/2019 - Present Yes Hypertension ICD-10-CM: I10 
ICD-9-CM: 401.9  4/6/2019 - Present Yes Asthma (Chronic) ICD-10-CM: J45.909 ICD-9-CM: 493.90  5/19/2016 - Present Yes Overview Signed 4/7/2019  7:19 AM by GARRET Emanuel Last Assessment & Plan:  
Albuterol prn Borderline personality disorder ([facility] (Chronic) ICD-10-CM: F60.3 ICD-9-CM: 301.83  5/19/2016 - Present Yes Overview Signed 4/7/2019  7:19 AM by GARRET Emanuel Last Assessment & Plan:  
Continue Prozac, Seroquel, and trazodone Plan: · CIRA/ATN/ rhabdomyolysis:  Dialysis per nephrology, on steroid taper for rhabdomyositis · Myositis: continue steroid taper, PT/OT, has prn norco/morphine · Polysubstance use: needs cessation, continued folate and thiamine, nicotine patch · UTI: treated · RLE DVT: on coumadin, pharmacy dosing, followup daily INR at goal 
· Leukocytosis:  Partially steroid contribution, further infectious workup negative, followup CBC/ repeat CXR/UA has been negative · HTN: continue norvasc, prn hydralazine · Toxic encephalopathy: resolved · Anemia: transfuse 2 units with HD today · Anxiety: continued xanax, add zoloft · Dyspnea: some contribution due to edema and weight gain, reassurance given, has negative cardiac and pulm workup, will address anxiety with adding zoloft, continue xanax , repeat CXR 
 
DC planning/Dispo:  pending Diet:  DIET RENAL 
DVT ppx:  Coumadin, INR 2.8 Signed: Jane Delacruz MD

## 2019-04-22 NOTE — MED STUDENT NOTES
*ATTENTION:  This note has been created by a medical student for educational purposes only. Please do not refer to the content of this note for clinical decision-making, billing, or other purposes. Please see attending physicians note to obtain clinical information on this patient. * Hospitalist Progress Note Admit Date:  2019  4:59 PM  
Name:  Allen Raymundo Age:  26 y.o. 
:  1990 MRN:  [id number] PCP:  None Treatment Team: Attending Provider: Valeria Marrero MD; Consulting Provider: Sharon Frankel MD; Care Manager: Ant Cardenas RN; Utilization Review: Jolanta Mcbride RN; Consulting Provider: Aubrey Ocampo MD; Consulting Provider: Roland Ho MD; Physical Therapist: Gayle Garcia DPT 
 
HPI/Subjective: F/U rhabdomyolysis, CIRA, myositis, RLE DVT.   
\"Ms. Hernandez is a 32 yo female with PMH of polysubstance use who is evaluated with malaise and bilateral hip pain on 19 and admitted with rhabdomyolysis, CIRA, RLE DVT, acute encephalopathy, UTI. She had UDS showing amphetamines, benzo, opiates  CT head negative. CXR negative. Rhabdomyolysis, likely was due to amphetamines, was managed with IVF without improvement and she has progressed to new dialysis start per nephrology. She has been seen by rheumatology/neurology, who recommends steroid taper for myositis. She continued to have LE pain, found to have right peroneal thrombus and has been managed with IV heparin to coumadin. She additionally had LLE pain/ decreased pulses with arterial duplex showing mild to moderate stenosis left femoral artery. She has been seen by vascular and no acute intervention needed. UTI treated with antibiotics, cx negative. BC NGTD. ECHO negative. CTAP showed pelvic fluid and possible PID- ID consulted. G/C negative.  GYN evaluated due to vaginal bleeding and there are no acute needs.  HIV and hepatitis are negative as well.  
 Plans are for home at discharge once stable. \" 
  
Patient admits chest pain and shortness of breath. The SOB started last night, she couldn't catch her breath and felt like she was drowning. She was put on oxygen and that helped some. She is able to go to the restroom but it takes a long time and she will spend the next 10 minutes out of breath. She has used her rescue inhaler three times with no help. She is anxious and in pain. She requests an increase in her xanax to help with her anxiety, she states she takes 4mg total at home per day. Objective:  
 
Patient Vitals for the past 24 hrs: 
 Temp Pulse Resp BP SpO2  
04/22/19 0730 97.4 °F (36.3 °C) (!) 113 18 120/77 100 % 04/22/19 0306 97.9 °F (36.6 °C)  22 142/86   
04/21/19 2351 97.9 °F (36.6 °C) (!) 106 19 143/83 99 % 04/21/19 1927 97.8 °F (36.6 °C) (!) 116 19 117/77 96 % 04/21/19 1553 97.9 °F (36.6 °C) (!) 114 19 (!) 138/94 100 % 04/21/19 1107 97.8 °F (36.6 °C) (!) 111 16 134/84 100 % Oxygen Therapy O2 Sat (%): 100 % (04/22/19 0730) Pulse via Oximetry: 127 beats per minute (04/06/19 2119) O2 Device: Room air (04/18/19 1502) Intake/Output Summary (Last 24 hours) at 4/22/2019 9897 Last data filed at 4/21/2019 2300 Gross per 24 hour Intake  Output 1650 ml Net -1650 ml *Note that automatically entered I/Os may not be accurate; dependent on patient compliance with collection and accurate  by [email or web address]. General:    Well nourished. Alert. CV:   Tachycardiac (110). No murmur, rub, or gallop. Lungs:   Rhonchi heard in lower lung fields, mild expiratory wheezing, tachypneic Abdomen:   Soft, nontender, distended. Extremities: Warm and dry. 3+ edema in lower extremeties. Skin:     No rashes or jaundice. Data Review: 
I have reviewed all labs, meds, and studies from the last 24 hours: 
 
Recent Results (from the past 24 hour(s)) PROTHROMBIN TIME + INR  Collection Time: 04/22/19  5:37 AM  
 Result Value Ref Range Prothrombin time 28.8 (H) 11.7 - 14.5 sec INR 2.8    
CBC WITH AUTOMATED DIFF Collection Time: 04/22/19  5:37 AM  
Result Value Ref Range WBC 26.2 (H) 4.3 - 11.1 K/uL  
 RBC 2.22 (L) 4.05 - 5.2 M/uL HGB 6.8 (LL) 11.7 - 15.4 g/dL HCT 21.6 (L) 35.8 - 46.3 % MCV 97.3 79.6 - 97.8 FL  
 MCH 30.6 26.1 - 32.9 PG  
 MCHC 31.5 31.4 - 35.0 g/dL  
 RDW 15.9 (H) 11.9 - 14.6 % PLATELET 637 (H) 918 - 450 K/uL MPV 9.6 9.4 - 12.3 FL ABSOLUTE NRBC 0.00 0.0 - 0.2 K/uL  
 DF AUTOMATED NEUTROPHILS 81 (H) 43 - 78 % LYMPHOCYTES 9 (L) 13 - 44 % MONOCYTES 6 4.0 - 12.0 % EOSINOPHILS 1 0.5 - 7.8 % BASOPHILS 0 0.0 - 2.0 % IMMATURE GRANULOCYTES 3 0.0 - 5.0 %  
 ABS. NEUTROPHILS 21.2 (H) 1.7 - 8.2 K/UL  
 ABS. LYMPHOCYTES 2.5 0.5 - 4.6 K/UL  
 ABS. MONOCYTES 1.4 (H) 0.1 - 1.3 K/UL  
 ABS. EOSINOPHILS 0.2 0.0 - 0.8 K/UL  
 ABS. BASOPHILS 0.0 0.0 - 0.2 K/UL  
 ABS. IMM. GRANS. 0.9 (H) 0.0 - 0.5 K/UL METABOLIC PANEL, BASIC Collection Time: 04/22/19  5:37 AM  
Result Value Ref Range Sodium 143 136 - 145 mmol/L Potassium 4.8 3.5 - 5.1 mmol/L Chloride 109 (H) 98 - 107 mmol/L  
 CO2 27 21 - 32 mmol/L Anion gap 7 7 - 16 mmol/L Glucose 88 65 - 100 mg/dL BUN 71 (H) 6 - 23 MG/DL Creatinine 3.88 (H) 0.6 - 1.0 MG/DL  
 GFR est AA 18 (L) >60 ml/min/1.73m2 GFR est non-AA 14 (L) >60 ml/min/1.73m2 Calcium 7.8 (L) 8.3 - 10.4 MG/DL All Micro Results Procedure Component Value Units Date/Time CULTURE, BLOOD [[id number]] Collected:  04/07/19 2130 Order Status:  Completed Specimen:  Blood Updated:  04/13/19 0154 Special Requests: --     
  LEFT 
ARM Culture result: NO GROWTH 5 DAYS     
 CULTURE, BLOOD [[id number]] Collected:  04/06/19 2032 Order Status:  Completed Specimen:  Blood Updated:  04/11/19 7344 Special Requests: --     
  LEFT 
HAND Culture result: NO GROWTH 5 DAYS CULTURE, URINE [[id number]] Collected:  19 1857 Order Status:  Completed Specimen:  Cath Urine Updated:  19 3844 Special Requests: NO SPECIAL REQUESTS Culture result: NO GROWTH 2 DAYS     
 CULTURE, URINE [[id number]] Collected:  19 214 Order Status:  Canceled Specimen:  Urine from Clean catch CULTURE, BLOOD [[id number]] Collected:  19 Order Status:  Canceled Specimen:  Blood Results for orders placed or performed during the hospital encounter of 19  
2D ECHO COMPLETE ADULT (TTE) W OR WO CONTR Narrative [place] [facility] [address] [place], [address] [facility] 
[phone] Transthoracic Echocardiogram 
2D, M-mode, Doppler, and Color Doppler Patient: Abena Amezcua MR #: [id number] : 1990 Age: 33 years Gender: Female Study date: 2019 Account #: [de-identified] Height: 59 in 
Weight: 124.7 lb 
BSA: 1.51 mï¾² Status:Routine Location: 205 BP: 120/ 78 Allergies: PHENYTOIN SODIUM EXTENDED, LEVETIRACETAM 
 
Sonographer:  Rnady Short RD Group:  [address] Cardiology Referring Physician:  Alexandre Ivory. Madeline Balderas MD 
Reading Physician:  Wisam Jade. Jaret Rosales MD [facility] INDICATIONS: PVD. *Per patient- unable to turn onto left side due to blood clot in leg. * PROCEDURE: This was a routine study. A transthoracic echocardiogram was 
performed. The study included complete 2D imaging, M-mode, complete spectral 
Doppler, and color Doppler. Echocardiographic views were limited by  
restricted 
patient mobility and poor acoustic window availability. Image quality was 
adequate. LEFT VENTRICLE: Size was normal. Systolic function was normal. Ejection 
fraction was estimated in the range of 55 % to 60 %. There were no regional 
wall motion abnormalities. Wall thickness was normal. The E/e' ratio was 6.2.  
Left ventricular diastolic function parameters were normal. 
 
 RIGHT VENTRICLE: The size was normal. Systolic function was normal. The 
tricuspid jet envelope definition was inadequate for estimation of RV  
systolic 
pressure. LEFT ATRIUM: Size was normal. 
 
RIGHT ATRIUM: Size was normal. 
 
SYSTEMIC VEINS: IVC: The inferior vena cava was normal in size and course. AORTIC VALVE: The valve was structurally normal, tri-commissural. There was  
no 
evidence for stenosis. There was no insufficiency. MITRAL VALVE: Valve structure was normal. There was no evidence for stenosis. There was trivial regurgitation. TRICUSPID VALVE: The valve structure was normal. There was no evidence for 
stenosis. There was no regurgitation. PULMONIC VALVE: Not well visualized. There was no evidence for stenosis. There 
was trivial regurgitation. PERICARDIUM: There was no pericardial effusion. AORTA: The root exhibited normal size. SUMMARY: 
 
-  Left ventricle: Systolic function was normal. Ejection fraction was 
estimated in the range of 55 % to 60 %. There were no regional wall motion 
abnormalities. SYSTEM MEASUREMENT TABLES 
 
2D mode AoR Diam (2D): 2.5 cm 
LA Dimension (2D): 3 cm Left Atrium Systolic Volume Index; Method of Disks, Biplane; 2D mode;: 15.2 
ml/m2 IVS/LVPW (2D): 1.1 IVSd (2D): 1 cm LVIDd (2D): 3.8 cm LVIDs (2D): 2.5 cm 
LVOT Area (2D): 3.1 cm2 LVPWd (2D): 0.9 cm Tissue Doppler Imaging LV Peak Early Charles Tissue Win; Lateral MA (TDI): 17.2 cm/s LV Peak Early Charles Tissue Win; Medial MA (TDI): 8.2 cm/s Unspecified Scan Mode Peak Grad; Mean; Antegrade Flow: 8 mm[Hg] Vmax; Antegrade Flow: 134 cm/s LVOT Diam: 2 cm 
MV Peak Win/LV Peak Tissue Win E-Wave; Lateral MA: 4 MV Peak Win/LV Peak Tissue Win E-Wave; Medial MA: 8.4 Prepared and signed by 
 
Alexis Kirby. [address], MD [facility] Signed 08-Apr-2019 12:22:39 Current Meds: 
Current Facility-Administered Medications Medication Dose Route Frequency  0.9% sodium chloride infusion 250 mL  250 mL IntraVENous PRN  predniSONE (DELTASONE) tablet 40 mg  40 mg Oral DAILY WITH BREAKFAST  warfarin (COUMADIN) tablet 5 mg  5 mg Oral QPM  
 ALPRAZolam (XANAX) tablet 1 mg  1 mg Oral BID  
 0.9% sodium chloride infusion 250 mL  250 mL IntraVENous PRN  
 morphine injection 2 mg  2 mg IntraVENous Q4H PRN  
 amLODIPine (NORVASC) tablet 10 mg  10 mg Oral DAILY  hydrALAZINE (APRESOLINE) 20 mg/mL injection 10 mg  10 mg IntraVENous Q6H PRN  
 diphenhydrAMINE (BENADRYL) capsule 25 mg  25 mg Oral DIALYSIS PRN  
 HYDROcodone-acetaminophen (NORCO)  mg tablet 1 Tab  1 Tab Oral Q6H PRN  
 diphenhydrAMINE (BENADRYL) capsule 25 mg  25 mg Oral Q6H PRN  thiamine HCL (B-1) tablet 100 mg  100 mg Oral DAILY  folic acid (FOLVITE) tablet 1 mg  1 mg Oral DAILY  sodium chloride (NS) flush 5-10 mL  5-10 mL IntraVENous PRN  
 ondansetron (ZOFRAN) injection 4 mg  4 mg IntraVENous Q6H PRN  
 nicotine (NICODERM CQ) 21 mg/24 hr patch 1 Patch  1 Patch TransDERmal DAILY  LORazepam (ATIVAN) injection 1 mg  1 mg IntraVENous Q6H PRN  
 naloxone (NARCAN) injection 0.4 mg  0.4 mg IntraVENous EVERY 2 MINUTES AS NEEDED Other Studies (last 24 hours): No results found. Assessment and Plan:  
 
Hospital Problems as of 4/22/2019 Date Reviewed: 4/7/2019 Codes Class Noted - Resolved POA Encephalopathy, toxic ICD-10-CM: G92 ICD-9-CM: 349.82  4/8/2019 - Present Unknown Toxic metabolic encephalopathy FXQ-81-OD: Q56 ICD-9-CM: 349.82  4/8/2019 - Present Unknown Rhabdomyolysis ICD-10-CM: Y91.78 ICD-9-CM: 728.88  4/7/2019 - Present Yes * (Principal) Sepsis ([facility] ICD-10-CM: A41.9 ICD-9-CM: 038.9, 995.91  4/6/2019 - Present Yes Hepatitis ICD-10-CM: K75.9 ICD-9-CM: 573.3  4/6/2019 - Present Yes Substance use disorder (Chronic) ICD-10-CM: F19.90 ICD-9-CM: 305.90  4/6/2019 - Present Yes Hip pain ICD-10-CM: M25.559 ICD-9-CM: 719.45  4/6/2019 - Present Yes Hyponatremia ICD-10-CM: E87.1 ICD-9-CM: 276.1  4/6/2019 - Present Yes CIRA (acute kidney injury) ([facility] ICD-10-CM: N17.9 ICD-9-CM: 584.9  4/6/2019 - Present Yes  
   
 UTI (urinary tract infection) ICD-10-CM: N39.0 ICD-9-CM: 599.0  4/6/2019 - Present Yes Hypertension ICD-10-CM: I10 
ICD-9-CM: 401.9  4/6/2019 - Present Yes Asthma (Chronic) ICD-10-CM: J45.909 ICD-9-CM: 493.90  5/19/2016 - Present Yes Overview Signed 4/7/2019  7:19 AM by GARRET Roth Last Assessment & Plan:  
Albuterol prn Borderline personality disorder ([facility] (Chronic) ICD-10-CM: F60.3 ICD-9-CM: 301.83  5/19/2016 - Present Yes Overview Signed 4/7/2019  7:19 AM by GARRET Roth Last Assessment & Plan:  
Continue Prozac, Seroquel, and trazodone Plan: 
  Asthma (5/19/2016) Overview: Last Assessment & Plan:  
    Albuterol prn Add nebulized Albuterol PRN every 6 hours SOB 
  
   
DC planning/Dispo:   
 
Diet:  DIET RENAL 
DVT ppx:  warfarin Signed: 
José Miguel Mae

## 2019-04-22 NOTE — PROGRESS NOTES
Renal Progress Note Admission Date: 4/6/2019 Subjective:  
  
Feels ok. Objective:  
 
Physical Exam:   
Patient Vitals for the past 8 hrs: 
 BP Temp Pulse Resp SpO2  
04/22/19 1301 120/75 98.2 °F (36.8 °C) (!) 118 22 92 % 04/22/19 0730 120/77 97.4 °F (36.3 °C) (!) 113 18 100 % Gen: comfortable , NAD HEENT: Dry membranes CV: S1, S2 
Lungs: Clear bilaterally Extem: 2+ edema Current Facility-Administered Medications Medication Dose Route Frequency  0.9% sodium chloride infusion 250 mL  250 mL IntraVENous PRN  
 0.9% sodium chloride infusion 250 mL  250 mL IntraVENous PRN  
 sertraline (ZOLOFT) tablet 50 mg  50 mg Oral QPM  
 predniSONE (DELTASONE) tablet 40 mg  40 mg Oral DAILY WITH BREAKFAST  warfarin (COUMADIN) tablet 5 mg  5 mg Oral QPM  
 ALPRAZolam (XANAX) tablet 1 mg  1 mg Oral BID  
 0.9% sodium chloride infusion 250 mL  250 mL IntraVENous PRN  
 morphine injection 2 mg  2 mg IntraVENous Q4H PRN  
 amLODIPine (NORVASC) tablet 10 mg  10 mg Oral DAILY  hydrALAZINE (APRESOLINE) 20 mg/mL injection 10 mg  10 mg IntraVENous Q6H PRN  
 diphenhydrAMINE (BENADRYL) capsule 25 mg  25 mg Oral DIALYSIS PRN  
 HYDROcodone-acetaminophen (NORCO)  mg tablet 1 Tab  1 Tab Oral Q6H PRN  
 diphenhydrAMINE (BENADRYL) capsule 25 mg  25 mg Oral Q6H PRN  thiamine HCL (B-1) tablet 100 mg  100 mg Oral DAILY  folic acid (FOLVITE) tablet 1 mg  1 mg Oral DAILY  sodium chloride (NS) flush 5-10 mL  5-10 mL IntraVENous PRN  
 ondansetron (ZOFRAN) injection 4 mg  4 mg IntraVENous Q6H PRN  
 LORazepam (ATIVAN) injection 1 mg  1 mg IntraVENous Q6H PRN  
 naloxone (NARCAN) injection 0.4 mg  0.4 mg IntraVENous EVERY 2 MINUTES AS NEEDED Data Review:  
 
LABS:  
Recent Results (from the past 12 hour(s)) PROTHROMBIN TIME + INR Collection Time: 04/22/19  5:37 AM  
Result Value Ref Range Prothrombin time 28.8 (H) 11.7 - 14.5 sec  INR 2.8    
CBC WITH AUTOMATED DIFF  
 Collection Time: 04/22/19  5:37 AM  
Result Value Ref Range WBC 26.2 (H) 4.3 - 11.1 K/uL  
 RBC 2.22 (L) 4.05 - 5.2 M/uL HGB 6.8 (LL) 11.7 - 15.4 g/dL HCT 21.6 (L) 35.8 - 46.3 % MCV 97.3 79.6 - 97.8 FL  
 MCH 30.6 26.1 - 32.9 PG  
 MCHC 31.5 31.4 - 35.0 g/dL  
 RDW 15.9 (H) 11.9 - 14.6 % PLATELET 548 (H) 766 - 450 K/uL MPV 9.6 9.4 - 12.3 FL ABSOLUTE NRBC 0.00 0.0 - 0.2 K/uL  
 DF AUTOMATED NEUTROPHILS 81 (H) 43 - 78 % LYMPHOCYTES 9 (L) 13 - 44 % MONOCYTES 6 4.0 - 12.0 % EOSINOPHILS 1 0.5 - 7.8 % BASOPHILS 0 0.0 - 2.0 % IMMATURE GRANULOCYTES 3 0.0 - 5.0 %  
 ABS. NEUTROPHILS 21.2 (H) 1.7 - 8.2 K/UL  
 ABS. LYMPHOCYTES 2.5 0.5 - 4.6 K/UL  
 ABS. MONOCYTES 1.4 (H) 0.1 - 1.3 K/UL  
 ABS. EOSINOPHILS 0.2 0.0 - 0.8 K/UL  
 ABS. BASOPHILS 0.0 0.0 - 0.2 K/UL  
 ABS. IMM. GRANS. 0.9 (H) 0.0 - 0.5 K/UL METABOLIC PANEL, BASIC Collection Time: 04/22/19  5:37 AM  
Result Value Ref Range Sodium 143 136 - 145 mmol/L Potassium 4.8 3.5 - 5.1 mmol/L Chloride 109 (H) 98 - 107 mmol/L  
 CO2 27 21 - 32 mmol/L Anion gap 7 7 - 16 mmol/L Glucose 88 65 - 100 mg/dL BUN 71 (H) 6 - 23 MG/DL Creatinine 3.88 (H) 0.6 - 1.0 MG/DL  
 GFR est AA 18 (L) >60 ml/min/1.73m2 GFR est non-AA 14 (L) >60 ml/min/1.73m2 Calcium 7.8 (L) 8.3 - 10.4 MG/DL  
TYPE + CROSSMATCH Collection Time: 04/22/19  9:41 AM  
Result Value Ref Range Crossmatch Expiration 04/25/2019 ABO/Rh(D) AB NEGATIVE Antibody screen NEG Unit number [id number] Blood component type  LR Unit division 00 Status of unit ISSUED Crossmatch result Compatible Unit number [id number] Blood component type  LR Unit division 00 Status of unit ALLOCATED Crossmatch result Compatible Plan:  
 
Principal Problem: 
  Sepsis ([address] (4/6/2019) Active Problems: 
  Hepatitis (4/6/2019) Substance use disorder (4/6/2019) Hip pain (4/6/2019) Hyponatremia (4/6/2019) CIRA (acute kidney injury) ([facility] (4/6/2019) UTI (urinary tract infection) (4/6/2019) Hypertension (4/6/2019) Rhabdomyolysis (4/7/2019) Asthma (5/19/2016) Overview: Last Assessment & Plan:  
    Albuterol prn Borderline personality disorder ([facility] (5/19/2016) Overview: Last Assessment & Plan:  
    Continue Prozac, Seroquel, and trazodone Encephalopathy, toxic (4/8/2019) Toxic metabolic encephalopathy (4/8/3190) CIRA with rhabdo(meth use)-  On dialysis for support. First HD  was 4/10/2019 . Initially anuric. Urine has been increasing. Last 24 hours is >2 liters. Last dialysis was 4/19/19. Renal function better with good UO. Will hold dialysis and follow for recovery.

## 2019-04-22 NOTE — PROGRESS NOTES
Physical Therapy Note: Attempted to see patient for physical therapy treatment, patient reports, \"I'm very short of breath, I'm in so much pain and my body just don't feel right. \" Going to HD in an hour per patient and visitor. Patient requests therapy return tomorrow. Will attempt as schedule allows. Thank you, Tony Soria, DPT

## 2019-04-22 NOTE — MED STUDENT NOTES
*ATTENTION:  This note has been created by a medical student for educational purposes only. Please do not refer to the content of this note for clinical decision-making, billing, or other purposes. Please see attending physicians note to obtain clinical information on this patient. * Hospitalist Progress Note Admit Date:  2019  4:59 PM  
Name:  Nida Moreno Age:  26 y.o. 
:  1990 MRN:  [id number] PCP:  None Treatment Team: Attending Provider: Myles Talavera MD; Consulting Provider: Luis Mead MD; Care Manager: Bernadine Magdaleno, JULISSA; Utilization Review: Beena Springer RN; Consulting Provider: Isaac Morris MD; Consulting Provider: Fabiana Cuadra MD; Physical Therapist: Ceci Raymond DPT 
 
HPI/Subjective: F/U rhabdomyolysis, CIRA, myositis, RLE DVT.   
\"Ms. Hernandez is a 32 yo female with PMH of polysubstance use who is evaluated with malaise and bilateral hip pain on 19 and admitted with rhabdomyolysis, CIRA, RLE DVT, acute encephalopathy, UTI. She had UDS showing amphetamines, benzo, opiates  CT head negative. CXR negative. Rhabdomyolysis, likely was due to amphetamines, was managed with IVF without improvement and she has progressed to new dialysis start per nephrology. She has been seen by rheumatology/neurology, who recommends steroid taper for myositis. She continued to have LE pain, found to have right peroneal thrombus and has been managed with IV heparin to coumadin. She additionally had LLE pain/ decreased pulses with arterial duplex showing mild to moderate stenosis left femoral artery. She has been seen by vascular and no acute intervention needed. UTI treated with antibiotics, cx negative. BC NGTD. ECHO negative. CTAP showed pelvic fluid and possible PID- ID consulted. G/C negative.  GYN evaluated due to vaginal bleeding and there are no acute needs.  HIV and hepatitis are negative as well.  
 Plans are for home at discharge once stable. \" 
 
4/22/19 Patient admits chest pain and shortness of breath. The SOB started last night, she couldn't catch her breath and felt like she was drowning. She was put on oxygen and that helped some. She is able to go to the restroom but it takes a long time and she will spend the next 10 minutes out of breath. She has used her rescue inhaler three times with no help. She is anxious and in pain. She requests an increase in her xanax to help with her anxiety, she states she takes 4mg total at home per day. Review of systems: 
Patient complains of: swelling, cough, difficulty breathing, anxiety, and pain. Patient denies: diarrhea, constipation, vaginal bleeding, chest pain, nausea Objective:  
 
Patient Vitals for the past 24 hrs: 
 Temp Pulse Resp BP SpO2  
04/22/19 0730 97.4 °F (36.3 °C) (!) 113 18 120/77 100 % 04/22/19 0306 97.9 °F (36.6 °C)  22 142/86   
04/21/19 2351 97.9 °F (36.6 °C) (!) 106 19 143/83 99 % 04/21/19 1927 97.8 °F (36.6 °C) (!) 116 19 117/77 96 % 04/21/19 1553 97.9 °F (36.6 °C) (!) 114 19 (!) 138/94 100 % Oxygen Therapy O2 Sat (%): 100 % (04/22/19 0730) Pulse via Oximetry: 127 beats per minute (04/06/19 2119) O2 Device: Room air (04/18/19 1502) Intake/Output Summary (Last 24 hours) at 4/22/2019 1229 Last data filed at 4/21/2019 2300 Gross per 24 hour Intake  Output 1350 ml Net -1350 ml *Note that automatically entered I/Os may not be accurate; dependent on patient compliance with collection and accurate  by [employer]. General:    Well nourished. Alert. CV:   Tachycardiac (110). No murmur, rub, or gallop. Lungs:   Rhonchi heard in lower lung fields, mild expiratory wheezing, tachypneic Abdomen:   Soft, nontender, distended. Extremities: Warm and dry. 3+ edema in lower extremeties. Skin:     No rashes or jaundice. Data Review: I have reviewed all labs, meds, and studies from the last 24 hours: 
 
Recent Results (from the past 24 hour(s)) PROTHROMBIN TIME + INR Collection Time: 04/22/19  5:37 AM  
Result Value Ref Range Prothrombin time 28.8 (H) 11.7 - 14.5 sec INR 2.8    
CBC WITH AUTOMATED DIFF Collection Time: 04/22/19  5:37 AM  
Result Value Ref Range WBC 26.2 (H) 4.3 - 11.1 K/uL  
 RBC 2.22 (L) 4.05 - 5.2 M/uL HGB 6.8 (LL) 11.7 - 15.4 g/dL HCT 21.6 (L) 35.8 - 46.3 % MCV 97.3 79.6 - 97.8 FL  
 MCH 30.6 26.1 - 32.9 PG  
 MCHC 31.5 31.4 - 35.0 g/dL  
 RDW 15.9 (H) 11.9 - 14.6 % PLATELET 820 (H) 136 - 450 K/uL MPV 9.6 9.4 - 12.3 FL ABSOLUTE NRBC 0.00 0.0 - 0.2 K/uL  
 DF AUTOMATED NEUTROPHILS 81 (H) 43 - 78 % LYMPHOCYTES 9 (L) 13 - 44 % MONOCYTES 6 4.0 - 12.0 % EOSINOPHILS 1 0.5 - 7.8 % BASOPHILS 0 0.0 - 2.0 % IMMATURE GRANULOCYTES 3 0.0 - 5.0 %  
 ABS. NEUTROPHILS 21.2 (H) 1.7 - 8.2 K/UL  
 ABS. LYMPHOCYTES 2.5 0.5 - 4.6 K/UL  
 ABS. MONOCYTES 1.4 (H) 0.1 - 1.3 K/UL  
 ABS. EOSINOPHILS 0.2 0.0 - 0.8 K/UL  
 ABS. BASOPHILS 0.0 0.0 - 0.2 K/UL  
 ABS. IMM. GRANS. 0.9 (H) 0.0 - 0.5 K/UL METABOLIC PANEL, BASIC Collection Time: 04/22/19  5:37 AM  
Result Value Ref Range Sodium 143 136 - 145 mmol/L Potassium 4.8 3.5 - 5.1 mmol/L Chloride 109 (H) 98 - 107 mmol/L  
 CO2 27 21 - 32 mmol/L Anion gap 7 7 - 16 mmol/L Glucose 88 65 - 100 mg/dL BUN 71 (H) 6 - 23 MG/DL Creatinine 3.88 (H) 0.6 - 1.0 MG/DL  
 GFR est AA 18 (L) >60 ml/min/1.73m2 GFR est non-AA 14 (L) >60 ml/min/1.73m2 Calcium 7.8 (L) 8.3 - 10.4 MG/DL  
TYPE + CROSSMATCH Collection Time: 04/22/19  9:41 AM  
Result Value Ref Range Crossmatch Expiration 04/25/2019 ABO/Rh(D) AB NEGATIVE Antibody screen NEG Unit number [id number] Blood component type RC LR Unit division 00 Status of unit ALLOCATED Crossmatch result Compatible All Micro Results Procedure Component Value Units Date/Time CULTURE, BLOOD [[id number]] Collected:  19 Order Status:  Completed Specimen:  Blood Updated:  19 6531 Special Requests: --     
  LEFT 
ARM Culture result: NO GROWTH 5 DAYS     
 CULTURE, BLOOD [[id number]] Collected:  19 Order Status:  Completed Specimen:  Blood Updated:  19 4621 Special Requests: --     
  LEFT 
HAND Culture result: NO GROWTH 5 DAYS     
 CULTURE, URINE [[id number]] Collected:  19 185 Order Status:  Completed Specimen:  Cath Urine Updated:  19 3131 Special Requests: NO SPECIAL REQUESTS Culture result: NO GROWTH 2 DAYS     
 CULTURE, URINE [[id number]] Collected:  19 Order Status:  Canceled Specimen:  Urine from Clean catch CULTURE, BLOOD [[id number]] Collected:  19 Order Status:  Canceled Specimen:  Blood Results for orders placed or performed during the hospital encounter of 19  
2D ECHO COMPLETE ADULT (TTE) W OR WO CONTR [facility]arrative [facility] [address] [place], [address] [facility] 
[phone] Transthoracic Echocardiogram 
2D, M-mode, Doppler, and Color Doppler Patient: Olegario Day MR #: [id number] : 1990 Age: 33 years Gender: Female Study date: 2019 Account #: [de-identified] Height: 59 in 
Weight: 124.7 lb 
BSA: 1.51 mï¾² Status:Routine Location: 205 BP: 120/ 78 Allergies: PHENYTOIN SODIUM EXTENDED, LEVETIRACETAM 
 
Sonographer:  Ladan Noriega [facility] Group:  [facility] Referring Physician:  Lynsey Woodawrd. Addie Nunez MD 
Reading Physician:  Dennis Mejia. Luis Tello MD [facility] INDICATIONS: PVD. *Per patient- unable to turn onto left side due to blood clot in leg. * PROCEDURE: This was a routine study. A transthoracic echocardiogram was 
performed.  The study included complete 2D imaging, M-mode, complete spectral 
 Doppler, and color Doppler. Echocardiographic views were limited by  
restricted 
patient mobility and poor acoustic window availability. Image quality was 
adequate. LEFT VENTRICLE: Size was normal. Systolic function was normal. Ejection 
fraction was estimated in the range of 55 % to 60 %. There were no regional 
wall motion abnormalities. Wall thickness was normal. The E/e' ratio was 6.2. Left ventricular diastolic function parameters were normal. 
 
RIGHT VENTRICLE: The size was normal. Systolic function was normal. The 
tricuspid jet envelope definition was inadequate for estimation of RV  
systolic 
pressure. LEFT ATRIUM: Size was normal. 
 
RIGHT ATRIUM: Size was normal. 
 
SYSTEMIC VEINS: IVC: The inferior vena cava was normal in size and course. AORTIC VALVE: The valve was structurally normal, tri-commissural. There was  
no 
evidence for stenosis. There was no insufficiency. MITRAL VALVE: Valve structure was normal. There was no evidence for stenosis. There was trivial regurgitation. TRICUSPID VALVE: The valve structure was normal. There was no evidence for 
stenosis. There was no regurgitation. PULMONIC VALVE: Not well visualized. There was no evidence for stenosis. There 
was trivial regurgitation. PERICARDIUM: There was no pericardial effusion. AORTA: The root exhibited normal size. SUMMARY: 
 
-  Left ventricle: Systolic function was normal. Ejection fraction was 
estimated in the range of 55 % to 60 %. There were no regional wall motion 
abnormalities. SYSTEM MEASUREMENT TABLES 
 
2D mode AoR Diam (2D): 2.5 cm 
LA Dimension (2D): 3 cm Left Atrium Systolic Volume Index; Method of Disks, Biplane; 2D mode;: 15.2 
ml/m2 IVS/LVPW (2D): 1.1 IVSd (2D): 1 cm LVIDd (2D): 3.8 cm LVIDs (2D): 2.5 cm 
LVOT Area (2D): 3.1 cm2 LVPWd (2D): 0.9 cm Tissue Doppler Imaging LV Peak Early Charles Tissue Win; Lateral MA (TDI): 17.2 cm/s LV Peak Early Charles Tissue Win; Medial MA (TDI): 8.2 cm/s Unspecified Scan Mode Peak Grad; Mean; Antegrade Flow: 8 mm[Hg] Vmax; Antegrade Flow: 134 cm/s LVOT Diam: 2 cm 
MV Peak Win/LV Peak Tissue Win E-Wave; Lateral MA: 4 MV Peak Win/LV Peak Tissue Win E-Wave; Medial MA: 8.4 Prepared and signed by 
 
Pattie Schreiber. Sejal Benjamin MD [facility] Signed 08-Apr-2019 12:22:39 Current Meds: 
Current Facility-Administered Medications Medication Dose Route Frequency  0.9% sodium chloride infusion 250 mL  250 mL IntraVENous PRN  
 0.9% sodium chloride infusion 250 mL  250 mL IntraVENous PRN  predniSONE (DELTASONE) tablet 40 mg  40 mg Oral DAILY WITH BREAKFAST  warfarin (COUMADIN) tablet 5 mg  5 mg Oral QPM  
 ALPRAZolam (XANAX) tablet 1 mg  1 mg Oral BID  
 0.9% sodium chloride infusion 250 mL  250 mL IntraVENous PRN  
 morphine injection 2 mg  2 mg IntraVENous Q4H PRN  
 amLODIPine (NORVASC) tablet 10 mg  10 mg Oral DAILY  hydrALAZINE (APRESOLINE) 20 mg/mL injection 10 mg  10 mg IntraVENous Q6H PRN  
 diphenhydrAMINE (BENADRYL) capsule 25 mg  25 mg Oral DIALYSIS PRN  
 HYDROcodone-acetaminophen (NORCO)  mg tablet 1 Tab  1 Tab Oral Q6H PRN  
 diphenhydrAMINE (BENADRYL) capsule 25 mg  25 mg Oral Q6H PRN  thiamine HCL (B-1) tablet 100 mg  100 mg Oral DAILY  folic acid (FOLVITE) tablet 1 mg  1 mg Oral DAILY  sodium chloride (NS) flush 5-10 mL  5-10 mL IntraVENous PRN  
 ondansetron (ZOFRAN) injection 4 mg  4 mg IntraVENous Q6H PRN  
 LORazepam (ATIVAN) injection 1 mg  1 mg IntraVENous Q6H PRN  
 naloxone (NARCAN) injection 0.4 mg  0.4 mg IntraVENous EVERY 2 MINUTES AS NEEDED Other Studies (last 24 hours): No results found. Assessment and Plan:  
 
Hospital Problems as of 4/22/2019 Date Reviewed: 4/7/2019 Codes Class Noted - Resolved POA Encephalopathy, toxic ICD-10-CM: G92 ICD-9-CM: 349.82  4/8/2019 - Present Unknown Toxic metabolic encephalopathy ZOS-26-YF: N09 ICD-9-CM: 349.82  4/8/2019 - Present Unknown Rhabdomyolysis ICD-10-CM: E94.88 ICD-9-CM: 728.88  4/7/2019 - Present Yes * (Principal) Sepsis ([facility] ICD-10-CM: A41.9 ICD-9-CM: 038.9, 995.91  4/6/2019 - Present Yes Hepatitis ICD-10-CM: K75.9 ICD-9-CM: 573.3  4/6/2019 - Present Yes Substance use disorder (Chronic) ICD-10-CM: F19.90 ICD-9-CM: 305.90  4/6/2019 - Present Yes Hip pain ICD-10-CM: M25.559 ICD-9-CM: 719.45  4/6/2019 - Present Yes Hyponatremia ICD-10-CM: E87.1 ICD-9-CM: 276.1  4/6/2019 - Present Yes CIRA (acute kidney injury) ([facility] ICD-10-CM: N17.9 ICD-9-CM: 584.9  4/6/2019 - Present Yes  
   
 UTI (urinary tract infection) ICD-10-CM: N39.0 ICD-9-CM: 599.0  4/6/2019 - Present Yes Hypertension ICD-10-CM: I10 
ICD-9-CM: 401.9  4/6/2019 - Present Yes Asthma (Chronic) ICD-10-CM: J45.909 ICD-9-CM: 493.90  5/19/2016 - Present Yes Overview Signed 4/7/2019  7:19 AM by GARRET Li Last Assessment & Plan:  
Albuterol prn Borderline personality disorder ([facility] (Chronic) ICD-10-CM: F60.3 ICD-9-CM: 301.83  5/19/2016 - Present Yes Overview Signed 4/7/2019  7:19 AM by GARRET Li Last Assessment & Plan:  
Continue Prozac, Seroquel, and trazodone Plan: ·  CIRA/ATN/Rhabdomyolysis- per renal - pt getting HD but now showing some signs of kidney recovery with more than 1000 cc of urine put out. Still having edema with SOB so may benefit from more dialysis if nephrology agrees.  
  
· She is hopeful to return to baseline renal function and not need dialysis- would opt for PD if still needed 
  
· Myositis -continue oral steroid taper  
  
· Polysubstance abuse advised to quit 
  
· RLE DVT- on coumadin.  INR 2.4.  
  
· Leukocytosis- likely secondary to steroids- infectious w/up Neg to date 
  
 · Persistent tachycardia and sob - vq scan showed low probability for PE 
  
· HTN- continue current meds- BP better controlled 
  
· Anxiety- increase xanax to 1mg TID 
  
· Anemia- hb below 7. Transfuse one unit with next dialysis 
  
   
DC planning/Dispo:   
 
Diet:  DIET RENAL 
DVT ppx:  warfarin Signed: 
Jane Obando

## 2019-04-22 NOTE — PROGRESS NOTES
Nutrition F/U Assessment:  
Diet: DIET RENAL Regular; FR 1000ML Last HD treatment 4-19-19 and plan to hold and f/u for recovery Pt reports her appetite is good and she has no problems eating. Pt was eating meal brought in from outside at time of RD visit. Ron Prather Macronutrient needs: EER:  3361-8754 kcal /day (25-30 kcal/kg UBW/62.7 kg) EPR:  53-66 grams protein/day (1.2-1.5 grams/kg IBW)-catabolic CIRA Intake/Comparative Standards: Average intake for past 7 day(s)/2 recorded meal(s): 100%. 2 meals does not represent a trend but potentially meets >00% of kcal and >100% of protein needs Intervention: 
Meals and snacks: Continue current diet. If pt needs continued renal diet restrictions without HD, please order nutrition consult for diet education. Rob Mendoza, [address], [address] [facility]

## 2019-04-22 NOTE — PROGRESS NOTES
Warfarin dosing per pharmacist 
 
Ant Hahn is a 26 y.o. female. Height: 4' 11\" (149.9 cm)    Weight: 85.7 kg (189 lb) Indication:  thrombosis R peroneal vein Goal INR:  2-3 Home dose:  New start Risk factors or significant drug interactions:  Vaginal bleeding Other anticoagulants:  none Daily Monitoring Date  INR     Warfarin dose HGB              Notes 4/8  1.0  ---  12.3  Hep gtt 
-----  
4/12  ---  5 mg  9.7  Hep gtt 4/13  1  5 mg  ---  Hep gtt 4/14  1.2  7 mg  8.3  Hep gtt 4/15  1.3  7 mg  8.0  Hep gtt 4/16  2.1  5 mg  7.0  Hep gtt 4/17  2.2  5 mg  7.9 
4/18  2.1  5 mg  7.7 4/19  1.9  6 mg  7.7 
4/20  2.3  5 mg  7.5 
4/21  2.4  5 mg  7.2 
4/22  2.8  3 mg  6.8 Pharmacy consulted to dose and monitor warfarin for Ms. Hernandez with a R peroneal vein thrombosis. Will attempt to keep INR 2 to 2.5 for now to minimize bleeding risk. INR trending up today - will reduce dose to 3 mg. Continue to follow INR daily. Pharmacy will continue to follow. Please call with any questions. Thank you, Tashia Miles, PharmD Clinical Pharmacist 
[phone]

## 2019-04-23 LAB
ABO + RH BLD: NORMAL
ANION GAP SERPL CALC-SCNC: 7 MMOL/L (ref 7–16)
BASOPHILS # BLD: 0.1 K/UL (ref 0–0.2)
BASOPHILS NFR BLD: 0 % (ref 0–2)
BLD PROD TYP BPU: NORMAL
BLD PROD TYP BPU: NORMAL
BLOOD GROUP ANTIBODIES SERPL: NORMAL
BPU ID: NORMAL
BPU ID: NORMAL
BUN SERPL-MCNC: 64 MG/DL (ref 6–23)
CALCIUM SERPL-MCNC: 7.9 MG/DL (ref 8.3–10.4)
CHLORIDE SERPL-SCNC: 110 MMOL/L (ref 98–107)
CO2 SERPL-SCNC: 27 MMOL/L (ref 21–32)
CREAT SERPL-MCNC: 3.36 MG/DL (ref 0.6–1)
CROSSMATCH RESULT,%XM: NORMAL
CROSSMATCH RESULT,%XM: NORMAL
DIFFERENTIAL METHOD BLD: ABNORMAL
EOSINOPHIL # BLD: 0.1 K/UL (ref 0–0.8)
EOSINOPHIL NFR BLD: 0 % (ref 0.5–7.8)
ERYTHROCYTE [DISTWIDTH] IN BLOOD BY AUTOMATED COUNT: 15.9 % (ref 11.9–14.6)
GLUCOSE SERPL-MCNC: 114 MG/DL (ref 65–100)
HCT VFR BLD AUTO: 27.3 % (ref 35.8–46.3)
HGB BLD-MCNC: 8.7 G/DL (ref 11.7–15.4)
IMM GRANULOCYTES # BLD AUTO: 0.8 K/UL (ref 0–0.5)
IMM GRANULOCYTES NFR BLD AUTO: 4 % (ref 0–5)
INR PPP: 2.5
LYMPHOCYTES # BLD: 1.8 K/UL (ref 0.5–4.6)
LYMPHOCYTES NFR BLD: 8 % (ref 13–44)
MCH RBC QN AUTO: 30 PG (ref 26.1–32.9)
MCHC RBC AUTO-ENTMCNC: 31.9 G/DL (ref 31.4–35)
MCV RBC AUTO: 94.1 FL (ref 79.6–97.8)
MONOCYTES # BLD: 1.4 K/UL (ref 0.1–1.3)
MONOCYTES NFR BLD: 6 % (ref 4–12)
NEUTS SEG # BLD: 18 K/UL (ref 1.7–8.2)
NEUTS SEG NFR BLD: 81 % (ref 43–78)
NRBC # BLD: 0 K/UL (ref 0–0.2)
PLATELET # BLD AUTO: 393 K/UL (ref 150–450)
PMV BLD AUTO: 9.6 FL (ref 9.4–12.3)
POTASSIUM SERPL-SCNC: 4.4 MMOL/L (ref 3.5–5.1)
PROTHROMBIN TIME: 26.3 SEC (ref 11.7–14.5)
RBC # BLD AUTO: 2.9 M/UL (ref 4.05–5.2)
SODIUM SERPL-SCNC: 144 MMOL/L (ref 136–145)
SPECIMEN EXP DATE BLD: NORMAL
STATUS OF UNIT,%ST: NORMAL
STATUS OF UNIT,%ST: NORMAL
UNIT DIVISION, %UDIV: 0
UNIT DIVISION, %UDIV: 0
WBC # BLD AUTO: 22.2 K/UL (ref 4.3–11.1)

## 2019-04-23 PROCEDURE — 74011250637 HC RX REV CODE- 250/637: Performed by: FAMILY MEDICINE

## 2019-04-23 PROCEDURE — 80048 BASIC METABOLIC PNL TOTAL CA: CPT

## 2019-04-23 PROCEDURE — 74011250637 HC RX REV CODE- 250/637: Performed by: INTERNAL MEDICINE

## 2019-04-23 PROCEDURE — 74011250636 HC RX REV CODE- 250/636: Performed by: FAMILY MEDICINE

## 2019-04-23 PROCEDURE — 65270000029 HC RM PRIVATE

## 2019-04-23 PROCEDURE — 85025 COMPLETE CBC W/AUTO DIFF WBC: CPT

## 2019-04-23 PROCEDURE — 85610 PROTHROMBIN TIME: CPT

## 2019-04-23 PROCEDURE — 74011636637 HC RX REV CODE- 636/637: Performed by: FAMILY MEDICINE

## 2019-04-23 RX ORDER — PREDNISONE 20 MG/1
20 TABLET ORAL
Status: DISCONTINUED | OUTPATIENT
Start: 2019-04-24 | End: 2019-04-24 | Stop reason: HOSPADM

## 2019-04-23 RX ORDER — FUROSEMIDE 20 MG/1
20 TABLET ORAL DAILY
Status: DISCONTINUED | OUTPATIENT
Start: 2019-04-24 | End: 2019-04-24 | Stop reason: HOSPADM

## 2019-04-23 RX ORDER — CARVEDILOL 6.25 MG/1
6.25 TABLET ORAL 2 TIMES DAILY WITH MEALS
Status: DISCONTINUED | OUTPATIENT
Start: 2019-04-23 | End: 2019-04-24 | Stop reason: HOSPADM

## 2019-04-23 RX ADMIN — HYDROCODONE BITARTRATE AND ACETAMINOPHEN 1 TABLET: 10; 325 TABLET ORAL at 12:05

## 2019-04-23 RX ADMIN — PREDNISONE 40 MG: 20 TABLET ORAL at 08:57

## 2019-04-23 RX ADMIN — Medication 10 ML: at 22:04

## 2019-04-23 RX ADMIN — MORPHINE SULFATE 2 MG: 2 INJECTION, SOLUTION INTRAMUSCULAR; INTRAVENOUS at 08:57

## 2019-04-23 RX ADMIN — Medication 10 ML: at 22:05

## 2019-04-23 RX ADMIN — MORPHINE SULFATE 2 MG: 2 INJECTION, SOLUTION INTRAMUSCULAR; INTRAVENOUS at 05:25

## 2019-04-23 RX ADMIN — MORPHINE SULFATE 2 MG: 2 INJECTION, SOLUTION INTRAMUSCULAR; INTRAVENOUS at 01:35

## 2019-04-23 RX ADMIN — WARFARIN SODIUM 3 MG: 1 TABLET ORAL at 17:40

## 2019-04-23 RX ADMIN — MORPHINE SULFATE 2 MG: 2 INJECTION, SOLUTION INTRAMUSCULAR; INTRAVENOUS at 22:04

## 2019-04-23 RX ADMIN — ALPRAZOLAM 1 MG: 0.5 TABLET ORAL at 10:03

## 2019-04-23 RX ADMIN — FOLIC ACID 1 MG: 1 TABLET ORAL at 08:57

## 2019-04-23 RX ADMIN — ALPRAZOLAM 1 MG: 0.5 TABLET ORAL at 17:40

## 2019-04-23 RX ADMIN — Medication 10 ML: at 01:35

## 2019-04-23 RX ADMIN — Medication 100 MG: at 08:57

## 2019-04-23 RX ADMIN — CARVEDILOL 6.25 MG: 6.25 TABLET, FILM COATED ORAL at 17:40

## 2019-04-23 RX ADMIN — AMLODIPINE BESYLATE 10 MG: 10 TABLET ORAL at 08:57

## 2019-04-23 RX ADMIN — Medication 10 ML: at 01:37

## 2019-04-23 RX ADMIN — MORPHINE SULFATE 2 MG: 2 INJECTION, SOLUTION INTRAMUSCULAR; INTRAVENOUS at 15:31

## 2019-04-23 RX ADMIN — SERTRALINE HYDROCHLORIDE 50 MG: 50 TABLET ORAL at 17:40

## 2019-04-23 NOTE — MED STUDENT NOTES
*ATTENTION:  This note has been created by a medical student for educational purposes only. Please do not refer to the content of this note for clinical decision-making, billing, or other purposes. Please see attending physicians note to obtain clinical information on this patient. * Hospitalist Progress Note Admit Date:  2019  4:59 PM  
Name:  Rajwinder Castro Age:  26 y.o. 
:  1990 MRN:  [id number] PCP:  None Treatment Team: Attending Provider: Rupali Bernardo MD; Consulting Provider: Aldair Huizar MD; Care Manager: Yuliana Campos, RN; Utilization Review: Geovanna Recinos, JULISSA; Consulting Provider: Diallo Live MD; Consulting Provider: Tanner Reed MD; Occupational Therapist: Mateusz Cross OT; Physical Therapist: Marleen Paz, PT, DPT 
 
HPI/Subjective:  
Ms. Sharif Mosher is a 34 yo female with PMH of polysubstance use who is evaluated with malaise and bilateral hip pain on 19 and admitted with rhabdomyolysis, CIRA, RLE DVT, acute encephalopathy, UTI. She had UDS showing amphetamines, benzo, opiates. CT head negative. CXR negative. Rhabdomyolysis, likely was due to amphetamines, was managed with IVF without improvement and she has progressed to new dialysis start per nephrology. She has been seen by rheumatology/neurology, who recommends steroid taper for myositis. She continued to have LE pain, found to have right peroneal thrombus and has been managed with IV heparin to coumadin. She additionally had LLE pain/ decreased pulses with arterial duplex showing mild to moderate stenosis left femoral artery. She has been seen by vascular and no acute intervention needed. V/Q scan low probability. UTI treated with antibiotics, cx negative. BC NGTD. ECHO negative. CTAP showed pelvic fluid and possible PID- ID consulted. G/C negative. GYN evaluated due to vaginal bleeding and there are no acute needs. HIV and hepatitis are negative as well. Plans are for home at discharge once stable. 4/23/19 Patient is feeling better today. She still has chest pressure and feels like she has to work to breathe but does not feel like she is drowning. She is still anxious and requesting more medication. She has SOB but no cough. Heart racing at times especially if she gets anxious. Denies N/V/C/D and abdominal pain. She states her urine is almost clear with normal frequency and no burning. States she did not get dialysis yesterday but did receive two units of blood and feels more awake today. Review of systems Patient denies vaginal bleeding, chest pain, abdominal pain, vaginal bleeding, urinary symptoms, head aches, vision changes. Patient still complains of swelling and weight gain Objective:  
 
Patient Vitals for the past 24 hrs: 
 Temp Pulse Resp BP SpO2  
04/23/19 0717 98 °F (36.7 °C) (!) 106 18 126/83 97 % 04/23/19 0300 98.3 °F (36.8 °C) (!) 117 18 126/74 94 % 04/22/19 2300 97.6 °F (36.4 °C) (!) 114 18 131/81 99 % 04/22/19 2054 97.9 °F (36.6 °C) (!) 111 18 126/77   
04/22/19 2009 98.1 °F (36.7 °C) (!) 112 18 137/78   
04/22/19 1909 98.2 °F (36.8 °C) (!) 117 18 126/76 98 % 04/22/19 1757 98.3 °F (36.8 °C) (!) 113 20 131/83 97 % 04/22/19 1743 98 °F (36.7 °C) (!) 108 20 132/81 96 % 04/22/19 1433 98.1 °F (36.7 °C) (!) 115 17 131/78 93 % 04/22/19 1432 98.1 °F (36.7 °C) (!) 115 19 131/78 93 % 04/22/19 1321 98 °F (36.7 °C) (!) 113 20 125/79 95 % 04/22/19 1301 98.2 °F (36.8 °C) (!) 118 22 120/75 92 % Oxygen Therapy O2 Sat (%): 97 % (04/23/19 0717) Pulse via Oximetry: 127 beats per minute (04/06/19 2119) O2 Device: Room air (04/18/19 1502) Intake/Output Summary (Last 24 hours) at 4/23/2019 0930 Last data filed at 4/23/2019 8986 Gross per 24 hour Intake 459.2 ml Output 1250 ml Net -790.8 ml  
   
*Note that automatically entered I/Os may not be accurate; dependent on patient compliance with collection and accurate  by [employer]. General:    Well nourished. Alert. CV:   Tachycardic 118. No murmur, rub, or gallop. Lungs: No wheezing, rhonchi, or rales. Abdomen:   Soft, nontender, some distension. Extremities: Warm and dry. 3+ edema in LE. Skin:     No rashes or jaundice. Neuro:  No gross focal deficits Data Review: 
I have reviewed all labs, meds, and studies from the last 24 hours: 
 
Recent Results (from the past 24 hour(s)) TYPE + CROSSMATCH Collection Time: 04/22/19  9:41 AM  
Result Value Ref Range Crossmatch Expiration 04/25/2019 ABO/Rh(D) AB NEGATIVE Antibody screen NEG Unit number [id number] Blood component type [facility] Unit division 00 Status of unit TRANSFUSED Crossmatch result Compatible Unit number [id number] Blood component type [facility] Unit division 00 Status of unit TRANSFUSED Crossmatch result Compatible PROTHROMBIN TIME + INR Collection Time: 04/23/19  5:28 AM  
Result Value Ref Range Prothrombin time 26.3 (H) 11.7 - 14.5 sec INR 2.5    
CBC WITH AUTOMATED DIFF Collection Time: 04/23/19  5:28 AM  
Result Value Ref Range WBC 22.2 (H) 4.3 - 11.1 K/uL  
 RBC 2.90 (L) 4.05 - 5.2 M/uL HGB 8.7 (L) 11.7 - 15.4 g/dL HCT 27.3 (L) 35.8 - 46.3 % MCV 94.1 79.6 - 97.8 FL  
 MCH 30.0 26.1 - 32.9 PG  
 MCHC 31.9 31.4 - 35.0 g/dL  
 RDW 15.9 (H) 11.9 - 14.6 % PLATELET 809 787 - 455 K/uL MPV 9.6 9.4 - 12.3 FL ABSOLUTE NRBC 0.00 0.0 - 0.2 K/uL  
 DF AUTOMATED NEUTROPHILS 81 (H) 43 - 78 % LYMPHOCYTES 8 (L) 13 - 44 % MONOCYTES 6 4.0 - 12.0 % EOSINOPHILS 0 (L) 0.5 - 7.8 % BASOPHILS 0 0.0 - 2.0 % IMMATURE GRANULOCYTES 4 0.0 - 5.0 %  
 ABS. NEUTROPHILS 18.0 (H) 1.7 - 8.2 K/UL  
 ABS. LYMPHOCYTES 1.8 0.5 - 4.6 K/UL  
 ABS. MONOCYTES 1.4 (H) 0.1 - 1.3 K/UL  
 ABS. EOSINOPHILS 0.1 0.0 - 0.8 K/UL  
 ABS. BASOPHILS 0.1 0.0 - 0.2 K/UL ABS. IMM. GRANS. 0.8 (H) 0.0 - 0.5 K/UL METABOLIC PANEL, BASIC Collection Time: 19  5:28 AM  
Result Value Ref Range Sodium 144 136 - 145 mmol/L Potassium 4.4 3.5 - 5.1 mmol/L Chloride 110 (H) 98 - 107 mmol/L  
 CO2 27 21 - 32 mmol/L Anion gap 7 7 - 16 mmol/L Glucose 114 (H) 65 - 100 mg/dL BUN 64 (H) 6 - 23 MG/DL Creatinine 3.36 (H) 0.6 - 1.0 MG/DL  
 GFR est AA 21 (L) >60 ml/min/1.73m2 GFR est non-AA 17 (L) >60 ml/min/1.73m2 Calcium 7.9 (L) 8.3 - 10.4 MG/DL All Micro Results Procedure Component Value Units Date/Time CULTURE, BLOOD [[id number]] Collected:  19 Order Status:  Completed Specimen:  Blood Updated:  19 5575 Special Requests: --     
  LEFT 
ARM Culture result: NO GROWTH 5 DAYS     
 CULTURE, BLOOD [[id number]] Collected:  19 Order Status:  Completed Specimen:  Blood Updated:  19 5976 Special Requests: --     
  LEFT 
HAND Culture result: NO GROWTH 5 DAYS     
 CULTURE, URINE [[id number]] Collected:  19 185 Order Status:  Completed Specimen:  Cath Urine Updated:  19 4971 Special Requests: NO SPECIAL REQUESTS Culture result: NO GROWTH 2 DAYS     
 CULTURE, URINE [[id number]] Collected:  19 Order Status:  Canceled Specimen:  Urine from Clean catch CULTURE, BLOOD [[id number]] Collected:  19 Order Status:  Canceled Specimen:  Blood Results for orders placed or performed during the hospital encounter of 19  
2D ECHO COMPLETE ADULT (TTE) W OR WO CONTR Narrative Raghav [address] [place], [address] [facility] 
[phone] Transthoracic Echocardiogram 
2D, M-mode, Doppler, and Color Doppler Patient: Linda Meadows MR #: [id number] : 1990 Age: 33 years Gender: Female Study date: 2019 Account #: [de-identified] Height: 59 in 
Weight: 124.7 lb 
BSA: 1.51 mï¾² Status:Routine Location: 205 BP: 120/ 78 Allergies: PHENYTOIN SODIUM EXTENDED, LEVETIRACETAM 
 
Sonographer:  Mary Momin RDCS Group:  [facility] Cardiology Referring Physician:  Vishal Lujan. Jessica Riley MD 
Reading Physician:  Ivania Pederson. Brandyn Melo MD [facility] INDICATIONS: PVD. *Per patient- unable to turn onto left side due to blood clot in leg. * PROCEDURE: This was a routine study. A transthoracic echocardiogram was 
performed. The study included complete 2D imaging, M-mode, complete spectral 
Doppler, and color Doppler. Echocardiographic views were limited by  
restricted 
patient mobility and poor acoustic window availability. Image quality was 
adequate. LEFT VENTRICLE: Size was normal. Systolic function was normal. Ejection 
fraction was estimated in the range of 55 % to 60 %. There were no regional 
wall motion abnormalities. Wall thickness was normal. The E/e' ratio was 6.2. Left ventricular diastolic function parameters were normal. 
 
RIGHT VENTRICLE: The size was normal. Systolic function was normal. The 
tricuspid jet envelope definition was inadequate for estimation of RV  
systolic 
pressure. LEFT ATRIUM: Size was normal. 
 
RIGHT ATRIUM: Size was normal. 
 
SYSTEMIC VEINS: IVC: The inferior vena cava was normal in size and course. AORTIC VALVE: The valve was structurally normal, tri-commissural. There was  
no 
evidence for stenosis. There was no insufficiency. MITRAL VALVE: Valve structure was normal. There was no evidence for stenosis. There was trivial regurgitation. TRICUSPID VALVE: The valve structure was normal. There was no evidence for 
stenosis. There was no regurgitation. PULMONIC VALVE: Not well visualized. There was no evidence for stenosis. There 
was trivial regurgitation. PERICARDIUM: There was no pericardial effusion. AORTA: The root exhibited normal size. SUMMARY: 
 
-  Left ventricle: Systolic function was normal. Ejection fraction was estimated in the range of 55 % to 60 %. There were no regional wall motion 
abnormalities. SYSTEM MEASUREMENT TABLES 
 
2D mode AoR Diam (2D): 2.5 cm 
LA Dimension (2D): 3 cm Left Atrium Systolic Volume Index; Method of Disks, Biplane; 2D mode;: 15.2 
ml/m2 IVS/LVPW (2D): 1.1 IVSd (2D): 1 cm LVIDd (2D): 3.8 cm LVIDs (2D): 2.5 cm 
LVOT Area (2D): 3.1 cm2 LVPWd (2D): 0.9 cm Tissue Doppler Imaging LV Peak Early Charles Tissue Win; Lateral MA (TDI): 17.2 cm/s LV Peak Early Charles Tissue Win; Medial MA (TDI): 8.2 cm/s Unspecified Scan Mode Peak Grad; Mean; Antegrade Flow: 8 mm[Hg] Vmax; Antegrade Flow: 134 cm/s LVOT Diam: 2 cm 
MV Peak Win/LV Peak Tissue Win E-Wave; Lateral MA: 4 MV Peak Win/LV Peak Tissue Win E-Wave; Medial MA: 8.4 Prepared and signed by 
 
Naomie Valenzuela. Jennifer Everett MD [facility] Signed 08-Apr-2019 12:22:39 Current Meds: 
Current Facility-Administered Medications Medication Dose Route Frequency  0.9% sodium chloride infusion 250 mL  250 mL IntraVENous PRN  
 0.9% sodium chloride infusion 250 mL  250 mL IntraVENous PRN  
 sertraline (ZOLOFT) tablet 50 mg  50 mg Oral QPM  
 warfarin (COUMADIN) tablet 3 mg  3 mg Oral QPM  
 predniSONE (DELTASONE) tablet 40 mg  40 mg Oral DAILY WITH BREAKFAST  ALPRAZolam (XANAX) tablet 1 mg  1 mg Oral BID  
 0.9% sodium chloride infusion 250 mL  250 mL IntraVENous PRN  
 morphine injection 2 mg  2 mg IntraVENous Q4H PRN  
 amLODIPine (NORVASC) tablet 10 mg  10 mg Oral DAILY  hydrALAZINE (APRESOLINE) 20 mg/mL injection 10 mg  10 mg IntraVENous Q6H PRN  
 diphenhydrAMINE (BENADRYL) capsule 25 mg  25 mg Oral DIALYSIS PRN  
 HYDROcodone-acetaminophen (NORCO)  mg tablet 1 Tab  1 Tab Oral Q6H PRN  
 diphenhydrAMINE (BENADRYL) capsule 25 mg  25 mg Oral Q6H PRN  thiamine HCL (B-1) tablet 100 mg  100 mg Oral DAILY  folic acid (FOLVITE) tablet 1 mg  1 mg Oral DAILY  sodium chloride (NS) flush 5-10 mL  5-10 mL IntraVENous PRN  
  ondansetron (ZOFRAN) injection 4 mg  4 mg IntraVENous Q6H PRN  
 LORazepam (ATIVAN) injection 1 mg  1 mg IntraVENous Q6H PRN  
 naloxone (NARCAN) injection 0.4 mg  0.4 mg IntraVENous EVERY 2 MINUTES AS NEEDED Other Studies (last 24 hours): Xr Chest Sngl V Result Date: 4/22/2019 Single View portable upright chest x-ray dated April 22, 2019 Reference Exam: April 20, 2019 Indication: Short of breath Findings: The cardiac silhouette is normal in size and contour. The lungs and pulmonary vascularity appear normal. Right-sided central venous catheter is again seen. Impression: Normal single portable chest x-ray. Assessment and Plan:  
 
Hospital Problems as of 4/23/2019 Date Reviewed: 4/7/2019 Codes Class Noted - Resolved POA Encephalopathy, toxic ICD-10-CM: G92 ICD-9-CM: 349.82  4/8/2019 - Present Unknown Toxic metabolic encephalopathy KKI-64-BD: E72 ICD-9-CM: 349.82  4/8/2019 - Present Unknown Rhabdomyolysis ICD-10-CM: N38.88 ICD-9-CM: 728.88  4/7/2019 - Present Yes * (Principal) Sepsis ([facility] ICD-10-CM: A41.9 ICD-9-CM: 038.9, 995.91  4/6/2019 - Present Yes Hepatitis ICD-10-CM: K75.9 ICD-9-CM: 573.3  4/6/2019 - Present Yes Substance use disorder (Chronic) ICD-10-CM: F19.90 ICD-9-CM: 305.90  4/6/2019 - Present Yes Hip pain ICD-10-CM: M25.559 ICD-9-CM: 719.45  4/6/2019 - Present Yes Hyponatremia ICD-10-CM: E87.1 ICD-9-CM: 276.1  4/6/2019 - Present Yes CIRA (acute kidney injury) ([facility] ICD-10-CM: N17.9 ICD-9-CM: 584.9  4/6/2019 - Present Yes  
   
 UTI (urinary tract infection) ICD-10-CM: N39.0 ICD-9-CM: 599.0  4/6/2019 - Present Yes Hypertension ICD-10-CM: I10 
ICD-9-CM: 401.9  4/6/2019 - Present Yes Asthma (Chronic) ICD-10-CM: J45.909 ICD-9-CM: 493.90  5/19/2016 - Present Yes Overview Signed 4/7/2019  7:19 AM by GARRET Anders   Last Assessment & Plan:  
Albuterol prn 
  
  
   
 Borderline personality disorder ([facility] (Chronic) ICD-10-CM: F60.3 ICD-9-CM: 301.83  5/19/2016 - Present Yes Overview Signed 4/7/2019  7:19 AM by GARRET Jamil Last Assessment & Plan:  
Continue Prozac, Seroquel, and trazodone Plan: 
Plan: 
CIRA/ATN/ rhabdomyolysis:  Dialysis per nephrology, on steroid taper for rhabdomyositis Myositis: continue steroid taper, PT/OT, has prn norco/morphine Polysubstance use: needs cessation, continued folate and thiamine, nicotine patch UTI: treated RLE DVT: on coumadin, pharmacy dosing, followup daily INR at goal 
Leukocytosis:  Partially steroid contribution, further infectious workup negative, followup CBC/ repeat CXR/UA has been negative HTN: continue norvasc, prn hydralazine Toxic encephalopathy: resolved Anemia: transfuse 2 units with HD today Anxiety: continue xanax and zoloft Dyspnea: some contribution due to edema and weight gain, reassurance given, has negative cardiac and pulm workup, will address anxiety with adding zoloft, continue xanax , repeat CXR 
 
 
 
DC planning/Dispo:   
 
Diet:  DIET RENAL 
DVT ppx:  coumadin Signed: 
Effie Nina

## 2019-04-23 NOTE — PROGRESS NOTES
Renal Progress Note Admission Date: 4/6/2019 Subjective:  
  
Feels ok. Some sob at times. Increased anxiety. S/p 2 units prbc yesterday. Renal function improving. Good uop. Objective:  
 
Physical Exam:   
Patient Vitals for the past 8 hrs: 
 BP Temp Pulse Resp SpO2 Weight 04/23/19 0717 126/83 98 °F (36.7 °C) (!) 106 18 97 %   
04/23/19 0300 126/74 98.3 °F (36.8 °C) (!) 117 18 94 % 87.2 kg (192 lb 4.8 oz) Exam:  
Gen: comfortable , NAD HEENT: Dry membranes CV: S1, S2 
Lungs: Clear bilaterally Extem: 2+ edema Current Facility-Administered Medications Medication Dose Route Frequency  0.9% sodium chloride infusion 250 mL  250 mL IntraVENous PRN  
 0.9% sodium chloride infusion 250 mL  250 mL IntraVENous PRN  
 sertraline (ZOLOFT) tablet 50 mg  50 mg Oral QPM  
 warfarin (COUMADIN) tablet 3 mg  3 mg Oral QPM  
 predniSONE (DELTASONE) tablet 40 mg  40 mg Oral DAILY WITH BREAKFAST  ALPRAZolam (XANAX) tablet 1 mg  1 mg Oral BID  
 0.9% sodium chloride infusion 250 mL  250 mL IntraVENous PRN  
 morphine injection 2 mg  2 mg IntraVENous Q4H PRN  
 amLODIPine (NORVASC) tablet 10 mg  10 mg Oral DAILY  hydrALAZINE (APRESOLINE) 20 mg/mL injection 10 mg  10 mg IntraVENous Q6H PRN  
 diphenhydrAMINE (BENADRYL) capsule 25 mg  25 mg Oral DIALYSIS PRN  
 HYDROcodone-acetaminophen (NORCO)  mg tablet 1 Tab  1 Tab Oral Q6H PRN  
 diphenhydrAMINE (BENADRYL) capsule 25 mg  25 mg Oral Q6H PRN  thiamine HCL (B-1) tablet 100 mg  100 mg Oral DAILY  folic acid (FOLVITE) tablet 1 mg  1 mg Oral DAILY  sodium chloride (NS) flush 5-10 mL  5-10 mL IntraVENous PRN  
 ondansetron (ZOFRAN) injection 4 mg  4 mg IntraVENous Q6H PRN  
 LORazepam (ATIVAN) injection 1 mg  1 mg IntraVENous Q6H PRN  
 naloxone (NARCAN) injection 0.4 mg  0.4 mg IntraVENous EVERY 2 MINUTES AS NEEDED Data Review:  
 
LABS:  
Recent Results (from the past 12 hour(s)) PROTHROMBIN TIME + INR  
 Collection Time: 04/23/19  5:28 AM  
Result Value Ref Range Prothrombin time 26.3 (H) 11.7 - 14.5 sec INR 2.5    
CBC WITH AUTOMATED DIFF Collection Time: 04/23/19  5:28 AM  
Result Value Ref Range WBC 22.2 (H) 4.3 - 11.1 K/uL  
 RBC 2.90 (L) 4.05 - 5.2 M/uL HGB 8.7 (L) 11.7 - 15.4 g/dL HCT 27.3 (L) 35.8 - 46.3 % MCV 94.1 79.6 - 97.8 FL  
 MCH 30.0 26.1 - 32.9 PG  
 MCHC 31.9 31.4 - 35.0 g/dL  
 RDW 15.9 (H) 11.9 - 14.6 % PLATELET 662 934 - 623 K/uL MPV 9.6 9.4 - 12.3 FL ABSOLUTE NRBC 0.00 0.0 - 0.2 K/uL  
 DF AUTOMATED NEUTROPHILS 81 (H) 43 - 78 % LYMPHOCYTES 8 (L) 13 - 44 % MONOCYTES 6 4.0 - 12.0 % EOSINOPHILS 0 (L) 0.5 - 7.8 % BASOPHILS 0 0.0 - 2.0 % IMMATURE GRANULOCYTES 4 0.0 - 5.0 %  
 ABS. NEUTROPHILS 18.0 (H) 1.7 - 8.2 K/UL  
 ABS. LYMPHOCYTES 1.8 0.5 - 4.6 K/UL  
 ABS. MONOCYTES 1.4 (H) 0.1 - 1.3 K/UL  
 ABS. EOSINOPHILS 0.1 0.0 - 0.8 K/UL  
 ABS. BASOPHILS 0.1 0.0 - 0.2 K/UL  
 ABS. IMM. GRANS. 0.8 (H) 0.0 - 0.5 K/UL METABOLIC PANEL, BASIC Collection Time: 04/23/19  5:28 AM  
Result Value Ref Range Sodium 144 136 - 145 mmol/L Potassium 4.4 3.5 - 5.1 mmol/L Chloride 110 (H) 98 - 107 mmol/L  
 CO2 27 21 - 32 mmol/L Anion gap 7 7 - 16 mmol/L Glucose 114 (H) 65 - 100 mg/dL BUN 64 (H) 6 - 23 MG/DL Creatinine 3.36 (H) 0.6 - 1.0 MG/DL  
 GFR est AA 21 (L) >60 ml/min/1.73m2 GFR est non-AA 17 (L) >60 ml/min/1.73m2 Calcium 7.9 (L) 8.3 - 10.4 MG/DL Plan:  
 
Principal Problem: 
  Sepsis ([facility] (4/6/2019) Active Problems: 
  Hepatitis (4/6/2019) Substance use disorder (4/6/2019) Hip pain (4/6/2019) Hyponatremia (4/6/2019) CIRA (acute kidney injury) ([facility] (4/6/2019) UTI (urinary tract infection) (4/6/2019) Hypertension (4/6/2019) Rhabdomyolysis (4/7/2019) Asthma (5/19/2016) Overview: Last Assessment & Plan:  
    Albuterol prn Borderline personality disorder ([facility] (5/19/2016) Overview: Last Assessment & Plan:  
    Continue Prozac, Seroquel, and trazodone Encephalopathy, toxic (4/8/2019) Toxic metabolic encephalopathy (2/4/0389) CIRA with rhabdo(meth use) - required dialysis. First HD  was 4/10/2019 . Initially anuric. Urine has been increasing and clearance improving. Last dialysis was 4/19/19. Renal function better with good UO. Will continue to hold dialysis and follow for recovery. If continues to improve can remove line in next day or so Anemia - s/p prbc

## 2019-04-23 NOTE — PROGRESS NOTES
Warfarin dosing per pharmacist 
 
Thanh Hernandez is a 26 y.o. female. Height: 4' 11\" (149.9 cm)    Weight: 87.2 kg (192 lb 4.8 oz) Indication:  thrombosis R peroneal vein Goal INR:  2-3 Home dose:  New start Risk factors or significant drug interactions:  Vaginal bleeding Other anticoagulants:  none Daily Monitoring Date  INR     Warfarin dose HGB              Notes 4/8  1.0  ---  12.3  Hep gtt 
-----  
4/12  ---  5 mg  9.7  Hep gtt 4/13  1  5 mg  ---  Hep gtt 4/14  1.2  7 mg  8.3  Hep gtt 4/15  1.3  7 mg  8.0  Hep gtt 4/16  2.1  5 mg  7.0  Hep gtt 4/17  2.2  5 mg  7.9 
4/18  2.1  5 mg  7.7 4/19  1.9  6 mg  7.7 
4/20  2.3  5 mg  7.5 
4/21  2.4  5 mg  7.2 
4/22  2.8  3 mg  6.8   
4/23  2.5  3 mg  8.7 Pharmacy consulted to dose and monitor warfarin for Ms. Hernandez with a R peroneal vein thrombosis. Will attempt to keep INR 2 to 2.5 for now to minimize bleeding risk. INR back down today. Will continue 3 mg again tonight and monitor tomorrow. Continue to follow INR daily. Pharmacy will continue to follow. Please call with any questions. Thank you, Prabhakar Christina, PharmD Clinical Pharmacist 
[phone]

## 2019-04-23 NOTE — PROGRESS NOTES
04/23/19 1622 Triple Lumen Triple lumen CVC 04/10/19 Right Internal jugular Placement Date/Time: 04/10/19 1135   Description (optional): Triple lumen CVC  Special Properties: CT injectable; Infusion port  Inserted By: Seema YANEZ  Number of Attempts: 1  Present on Admission/Arrival: No  Orientation: Right  Location: Inte. .. [employer] Being Utilized Yes Criteria for Appropriate Use Limited/no vessel suitable for conventional peripheral access Site Assessment Clean, dry, & intact Infiltration Assessment 0 Affected Extremity/Extremities Color distal to insertion site pink (or appropriate for race) Date of Last Dressing Change 04/23/19 Dressing Status Clean, dry, & intact Dressing Type Disk with Chlorhexadine gluconate (CHG); Transparent Action Taken Dressing changed Proximal Hub Color/Line Status White;Flushed;Capped Positive Blood Return (Medial Site) Yes Medial Hub Color/Line Status Blue;Flushed;Capped Positive Blood Return (Lateral Site) Yes Distal Hub Color/Line Status Brown;Flushed;Capped Positive Blood Return (Site #3) Yes Alcohol Cap Used No  
Hemodialysis Access 04/10/19 Placement Date/Time: 04/10/19 1125   Special Properties: CT injectable; Infusion port  Number of Attempts: 1  Inserted By: Seema YANEZ  Present on Admission/Arrival: No  Access Location: Internal jugular  Access Type: Permanent  Permanent Type: T... [employer] Being Utilized Yes Criteria for Appropriate Use Dialysis/apheresis Date Accessed  04/19/19 Site Assessment Clean, dry, & intact Date of Last Dressing Change 04/23/19 Dressing Status Clean, dry, & intact Dressing Type Disk with Chlorhexadine gluconate (CHG); Transparent Proximal Hub Color/Line Status Capped Distal Hub Color/Line Status Capped CVC and dialysis access dressings were changed using sterile technique. Dressings were changed with Ricardo Martini RN, of the [facility]. Patient tolerated well.

## 2019-04-23 NOTE — PROGRESS NOTES
END OF SHIFT NOTE: 
 
INTAKE/OUTPUT 
04/22 0701 - 04/23 0700 In: 459.2 Out: 1250 [[id number]] Voiding: YES Catheter: NO 
Drain:   
 
 
 
 
 
Flatus: Patient does have flatus present. Stool:  0 occurrences. Characteristics: 
Stool Assessment Stool Color: Jayson Left Stool Appearance: Loose Stool Amount: Medium Stool Source/Status: Rectum Emesis: 0 occurrences. Characteristics: VITAL SIGNS Patient Vitals for the past 12 hrs: 
 Temp Pulse Resp BP SpO2  
04/23/19 0300 98.3 °F (36.8 °C) (!) 117 18 126/74 94 % 04/22/19 2300 97.6 °F (36.4 °C) (!) 114 18 131/81 99 % 04/22/19 2054 97.9 °F (36.6 °C) (!) 111 18 126/77   
04/22/19 2009 98.1 °F (36.7 °C) (!) 112 18 137/78  Pain Assessment Pain Intensity 1: 7 (04/23/19 0524) Pain Location 1: Leg, Abdomen, Back Pain Intervention(s) 1: Medication (see MAR) Patient Stated Pain Goal: 0 Ambulating Yes in room Shift report given to oncoming nurse at the bedside.  
 
Queen Rashawn RN

## 2019-04-23 NOTE — PROGRESS NOTES
END OF SHIFT NOTE: 
 
INTAKE/OUTPUT 
04/22 0701 - 04/23 0700 In: 459.2 Out: 1250 [[id number]] Voiding: YES Catheter: NO 
Drain:   
 
Flatus: Patient does have flatus present. Stool:  1 occurrences. Characteristics: 
Stool Assessment Stool Color: Glendia Amel Stool Appearance: Loose Stool Amount: Medium Stool Source/Status: Rectum Emesis: 0 occurrences. Characteristics: VITAL SIGNS Patient Vitals for the past 12 hrs: 
 Temp Pulse Resp BP SpO2  
04/23/19 1438 97.5 °F (36.4 °C) (!) 108 16 126/80 94 % 04/23/19 1043 97.9 °F (36.6 °C) (!) 108 18 135/86 98 % Pain Assessment Pain Intensity 1: 4 (04/23/19 1742) Pain Location 1: Abdomen, Back Pain Intervention(s) 1: Medication (see MAR) Patient Stated Pain Goal: 0 Ambulating Yes- in room. Patient has been encouraged to walk around the hallway. Patient c/o pain from swelling in BUE/BLE. Patient c/o generalized pain, Patient received morphine X 2 and norco X 1. Shift report given to oncoming nurse at the bedside.  
 
Shashank Welch RN

## 2019-04-23 NOTE — PROGRESS NOTES
PT Note:  Attempted PT, patient refused stating legs hurting and swollen. States she can barely \"make it to the bathroom\". Will attempt another time/day as schedule permits.   Angel Hook, PT, DPT

## 2019-04-23 NOTE — PROGRESS NOTES
Received call from Devin Rubinstein [phone], patient's Mother who provided pt's privacy code number. Ms. Kristina Valle was inquiring of patient's status and asked if she could block certain visitors from Daughter's room. She stated that one male visitor with green back pack is bringing in alcohol and possibly drugs. States that she found empty alcohol bottles in room and her daughter finally admitted that he had brought in the alcohol. Notified Dr. Stanley Guevara of conversation with Ms. Kristina Valle, no orders received

## 2019-04-23 NOTE — PROGRESS NOTES
Hospitalist Progress Note Admit Date:  2019  4:59 PM  
Name:  Rita Brady Age:  26 y.o. 
:  1990 MRN:  [id number] PCP:  None Treatment Team: Attending Provider: Lola Caruso MD; Consulting Provider: Jessica Marquez MD; Care Manager: Carlos Mena, RN; Utilization Review: Dawson Quesada RN; Consulting Provider: Patricia Jones MD; Consulting Provider: Jaquelin Montaño MD; Occupational Therapist: Myles Soto OT; Physical Therapist: Domi Cerda, PT, DPT 
 
HPI/Subjective:  
 
 
Ms. David Sahni is a 32 yo female with PMH of polysubstance use who is evaluated with malaise and bilateral hip pain on 19 and admitted with rhabdomyolysis, CIRA, RLE DVT, acute encephalopathy, UTI. She had UDS showing amphetamines, benzo, opiates. CT head negative. CXR negative. Rhabdomyolysis, likely was due to amphetamines, was managed with IVF without improvement and she has progressed to new dialysis start per nephrology. She has been seen by rheumatology/neurology, who recommends steroid taper for myositis. She continued to have LE pain, found to have right peroneal thrombus and has been managed with IV heparin to coumadin. She additionally had LLE pain/ decreased pulses with arterial duplex showing mild to moderate stenosis left femoral artery. She has been seen by vascular and no acute intervention needed. V/Q scan low probability. UTI treated with antibiotics, cx negative. BC NGTD. ECHO negative. CTAP showed pelvic fluid and possible PID- ID consulted. G/C negative. GYN evaluated due to vaginal bleeding and there are no acute needs. HIV and hepatitis are negative as well. She received 2 units PRBC 19. She has significant anxiety with xanax and zoloft added. Plans are for home at discharge once stable.  
 
 
 
19  Feels really short of breath, has significant weight gain, still anxious, no diet changes, has diffuse myalgias and unable to work with PT  
 
 Last HD 4-19 with good urine output Objective:  
 
Patient Vitals for the past 24 hrs: 
 Temp Pulse Resp BP SpO2  
04/23/19 1043 97.9 °F (36.6 °C) (!) 108 18 135/86 98 % 04/23/19 0717 98 °F (36.7 °C) (!) 106 18 126/83 97 % 04/23/19 0300 98.3 °F (36.8 °C) (!) 117 18 126/74 94 % 04/22/19 2300 97.6 °F (36.4 °C) (!) 114 18 131/81 99 % 04/22/19 2054 97.9 °F (36.6 °C) (!) 111 18 126/77   
04/22/19 2009 98.1 °F (36.7 °C) (!) 112 18 137/78   
04/22/19 1909 98.2 °F (36.8 °C) (!) 117 18 126/76 98 % 04/22/19 1757 98.3 °F (36.8 °C) (!) 113 20 131/83 97 % 04/22/19 1743 98 °F (36.7 °C) (!) 108 20 132/81 96 % 04/22/19 1433 98.1 °F (36.7 °C) (!) 115 17 131/78 93 % 04/22/19 1432 98.1 °F (36.7 °C) (!) 115 19 131/78 93 % 04/22/19 1321 98 °F (36.7 °C) (!) 113 20 125/79 95 % 04/22/19 1301 98.2 °F (36.8 °C) (!) 118 22 120/75 92 % Oxygen Therapy O2 Sat (%): 98 % (04/23/19 1043) Pulse via Oximetry: 127 beats per minute (04/06/19 2119) O2 Device: Room air (04/18/19 1502) Intake/Output Summary (Last 24 hours) at 4/23/2019 1257 Last data filed at 4/23/2019 3156 Gross per 24 hour Intake 459.2 ml Output 1250 ml Net -790.8 ml  
   
*Note that automatically entered I/Os may not be accurate; dependent on patient compliance with collection and accurate  by techs. General:    Well nourished. alert, no distress CV:   RRR. No murmur, rub, or gallop. 2-3+ edema Lungs:   CTAB. No wheezing, rhonchi, or rales. Abdomen:   Soft,  distended, nontender, decreased BS Skin:     No rashes or jaundice. Neuro:  No gross focal deficits Data Review: 
I have reviewed all labs, meds, and studies from the last 24 hours: 
 
Recent Results (from the past 24 hour(s)) PROTHROMBIN TIME + INR Collection Time: 04/23/19  5:28 AM  
Result Value Ref Range Prothrombin time 26.3 (H) 11.7 - 14.5 sec INR 2.5    
CBC WITH AUTOMATED DIFF Collection Time: 04/23/19  5:28 AM  
Result Value Ref Range WBC 22.2 (H) 4.3 - 11.1 K/uL  
 RBC 2.90 (L) 4.05 - 5.2 M/uL HGB 8.7 (L) 11.7 - 15.4 g/dL HCT 27.3 (L) 35.8 - 46.3 % MCV 94.1 79.6 - 97.8 FL  
 MCH 30.0 26.1 - 32.9 PG  
 MCHC 31.9 31.4 - 35.0 g/dL  
 RDW 15.9 (H) 11.9 - 14.6 % PLATELET 028 353 - 788 K/uL MPV 9.6 9.4 - 12.3 FL ABSOLUTE NRBC 0.00 0.0 - 0.2 K/uL  
 DF AUTOMATED NEUTROPHILS 81 (H) 43 - 78 % LYMPHOCYTES 8 (L) 13 - 44 % MONOCYTES 6 4.0 - 12.0 % EOSINOPHILS 0 (L) 0.5 - 7.8 % BASOPHILS 0 0.0 - 2.0 % IMMATURE GRANULOCYTES 4 0.0 - 5.0 %  
 ABS. NEUTROPHILS 18.0 (H) 1.7 - 8.2 K/UL  
 ABS. LYMPHOCYTES 1.8 0.5 - 4.6 K/UL  
 ABS. MONOCYTES 1.4 (H) 0.1 - 1.3 K/UL  
 ABS. EOSINOPHILS 0.1 0.0 - 0.8 K/UL  
 ABS. BASOPHILS 0.1 0.0 - 0.2 K/UL  
 ABS. IMM. GRANS. 0.8 (H) 0.0 - 0.5 K/UL METABOLIC PANEL, BASIC Collection Time: 04/23/19  5:28 AM  
Result Value Ref Range Sodium 144 136 - 145 mmol/L Potassium 4.4 3.5 - 5.1 mmol/L Chloride 110 (H) 98 - 107 mmol/L  
 CO2 27 21 - 32 mmol/L Anion gap 7 7 - 16 mmol/L Glucose 114 (H) 65 - 100 mg/dL BUN 64 (H) 6 - 23 MG/DL Creatinine 3.36 (H) 0.6 - 1.0 MG/DL  
 GFR est AA 21 (L) >60 ml/min/1.73m2 GFR est non-AA 17 (L) >60 ml/min/1.73m2 Calcium 7.9 (L) 8.3 - 10.4 MG/DL All Micro Results Procedure Component Value Units Date/Time CULTURE, BLOOD [[id number]] Collected:  04/07/19 2130 Order Status:  Completed Specimen:  Blood Updated:  04/13/19 0366 Special Requests: --     
  LEFT 
ARM Culture result: NO GROWTH 5 DAYS     
 CULTURE, BLOOD [[id number]] Collected:  04/06/19 2032 Order Status:  Completed Specimen:  Blood Updated:  04/11/19 2674 Special Requests: --     
  LEFT 
HAND Culture result: NO GROWTH 5 DAYS     
 CULTURE, URINE [[id number]] Collected:  04/06/19 1857 Order Status:  Completed Specimen:  Cath Urine Updated:  04/09/19 7303 Special Requests: NO SPECIAL REQUESTS Culture result: NO GROWTH 2 DAYS CULTURE, URINE [[id number]] Collected:  19 Order Status:  Canceled Specimen:  Urine from Clean catch CULTURE, BLOOD [[id number]] Collected:  19 Order Status:  Canceled Specimen:  Blood Results for orders placed or performed during the hospital encounter of 19  
2D ECHO COMPLETE ADULT (TTE) W OR WO CONTR Narrative [facility] [address] [place], [address] [facility] 
[phone] Transthoracic Echocardiogram 
2D, M-mode, Doppler, and Color Doppler Patient: Alta Valera MR #: [id number] : 1990 Age: 33 years Gender: Female Study date: 2019 Account #: [de-identified] Height: 59 in 
Weight: 124.7 lb 
BSA: 1.51 mï¾² Status:Routine Location: 205 BP: 120/ 78 Allergies: PHENYTOIN SODIUM EXTENDED, LEVETIRACETAM 
 
Sonographer:  Danyelle Lizarraga RDCS Group:  [facility] Referring Physician:  Bg Valencia. Coco Hernandez MD 
Reading Physician:  Burgess Chavarria. Talon Mitchell MD [facility] INDICATIONS: PVD. *Per patient- unable to turn onto left side due to blood clot in leg. * PROCEDURE: This was a routine study. A transthoracic echocardiogram was 
performed. The study included complete 2D imaging, M-mode, complete spectral 
Doppler, and color Doppler. Echocardiographic views were limited by  
restricted 
patient mobility and poor acoustic window availability. Image quality was 
adequate. LEFT VENTRICLE: Size was normal. Systolic function was normal. Ejection 
fraction was estimated in the range of 55 % to 60 %. There were no regional 
wall motion abnormalities. Wall thickness was normal. The E/e' ratio was 6.2. Left ventricular diastolic function parameters were normal. 
 
RIGHT VENTRICLE: The size was normal. Systolic function was normal. The 
tricuspid jet envelope definition was inadequate for estimation of RV  
systolic 
pressure.  
 
LEFT ATRIUM: Size was normal. 
 
RIGHT ATRIUM: Size was normal. 
 
 SYSTEMIC VEINS: IVC: The inferior vena cava was normal in size and course. AORTIC VALVE: The valve was structurally normal, tri-commissural. There was  
no 
evidence for stenosis. There was no insufficiency. MITRAL VALVE: Valve structure was normal. There was no evidence for stenosis. There was trivial regurgitation. TRICUSPID VALVE: The valve structure was normal. There was no evidence for 
stenosis. There was no regurgitation. PULMONIC VALVE: Not well visualized. There was no evidence for stenosis. There 
was trivial regurgitation. PERICARDIUM: There was no pericardial effusion. AORTA: The root exhibited normal size. SUMMARY: 
 
-  Left ventricle: Systolic function was normal. Ejection fraction was 
estimated in the range of 55 % to 60 %. There were no regional wall motion 
abnormalities. SYSTEM MEASUREMENT TABLES 
 
2D mode AoR Diam (2D): 2.5 cm 
LA Dimension (2D): 3 cm Left Atrium Systolic Volume Index; Method of Disks, Biplane; 2D mode;: 15.2 
ml/m2 IVS/LVPW (2D): 1.1 IVSd (2D): 1 cm LVIDd (2D): 3.8 cm LVIDs (2D): 2.5 cm 
LVOT Area (2D): 3.1 cm2 LVPWd (2D): 0.9 cm Tissue Doppler Imaging LV Peak Early Charles Tissue Win; Lateral MA (TDI): 17.2 cm/s LV Peak Early Charles Tissue Win; Medial MA (TDI): 8.2 cm/s Unspecified Scan Mode Peak Grad; Mean; Antegrade Flow: 8 mm[Hg] Vmax; Antegrade Flow: 134 cm/s LVOT Diam: 2 cm 
MV Peak Win/LV Peak Tissue Win E-Wave; Lateral MA: 4 MV Peak Win/LV Peak Tissue Win E-Wave; Medial MA: 8.4 Prepared and signed by 
 
Burgess Chavarria. Talon Mitchell MD [facility] Signed 08-Apr-2019 12:22:39 Current Meds: 
Current Facility-Administered Medications Medication Dose Route Frequency  carvedilol (COREG) tablet 6.25 mg  6.25 mg Oral BID WITH MEALS  
 [START ON 4/24/2019] predniSONE (DELTASONE) tablet 20 mg  20 mg Oral DAILY WITH BREAKFAST  0.9% sodium chloride infusion 250 mL  250 mL IntraVENous PRN  
  0.9% sodium chloride infusion 250 mL  250 mL IntraVENous PRN  
 sertraline (ZOLOFT) tablet 50 mg  50 mg Oral QPM  
 warfarin (COUMADIN) tablet 3 mg  3 mg Oral QPM  
 ALPRAZolam (XANAX) tablet 1 mg  1 mg Oral BID  
 0.9% sodium chloride infusion 250 mL  250 mL IntraVENous PRN  
 morphine injection 2 mg  2 mg IntraVENous Q4H PRN  
 hydrALAZINE (APRESOLINE) 20 mg/mL injection 10 mg  10 mg IntraVENous Q6H PRN  
 diphenhydrAMINE (BENADRYL) capsule 25 mg  25 mg Oral DIALYSIS PRN  
 HYDROcodone-acetaminophen (NORCO)  mg tablet 1 Tab  1 Tab Oral Q6H PRN  
 diphenhydrAMINE (BENADRYL) capsule 25 mg  25 mg Oral Q6H PRN  thiamine HCL (B-1) tablet 100 mg  100 mg Oral DAILY  folic acid (FOLVITE) tablet 1 mg  1 mg Oral DAILY  sodium chloride (NS) flush 5-10 mL  5-10 mL IntraVENous PRN  
 ondansetron (ZOFRAN) injection 4 mg  4 mg IntraVENous Q6H PRN  
 LORazepam (ATIVAN) injection 1 mg  1 mg IntraVENous Q6H PRN  
 naloxone (NARCAN) injection 0.4 mg  0.4 mg IntraVENous EVERY 2 MINUTES AS NEEDED Other Studies (last 24 hours): Xr Chest Sngl V Result Date: 4/22/2019 Single View portable upright chest x-ray dated April 22, 2019 Reference Exam: April 20, 2019 Indication: Short of breath Findings: The cardiac silhouette is normal in size and contour. The lungs and pulmonary vascularity appear normal. Right-sided central venous catheter is again seen. Impression: Normal single portable chest x-ray. Assessment and Plan:  
 
Hospital Problems as of 4/23/2019 Date Reviewed: 4/7/2019 Codes Class Noted - Resolved POA Encephalopathy, toxic ICD-10-CM: G92 ICD-9-CM: 349.82  4/8/2019 - Present Unknown Toxic metabolic encephalopathy [id number]: Q04 ICD-9-CM: 349.82  4/8/2019 - Present Unknown Rhabdomyolysis ICD-10-CM: C12.15 ICD-9-CM: 728.88  4/7/2019 - Present Yes * (Principal) Sepsis ([address] ICD-10-CM: A41.9 ICD-9-CM: 038.9, 995.91  4/6/2019 - Present Yes Hepatitis ICD-10-CM: K75.9 ICD-9-CM: 573.3  4/6/2019 - Present Yes Substance use disorder (Chronic) ICD-10-CM: F19.90 ICD-9-CM: 305.90  4/6/2019 - Present Yes Hip pain ICD-10-CM: M25.559 ICD-9-CM: 719.45  4/6/2019 - Present Yes Hyponatremia ICD-10-CM: E87.1 ICD-9-CM: 276.1  4/6/2019 - Present Yes CIRA (acute kidney injury) ([facility] ICD-10-CM: N17.9 ICD-9-CM: 584.9  4/6/2019 - Present Yes  
   
 UTI (urinary tract infection) ICD-10-CM: N39.0 ICD-9-CM: 599.0  4/6/2019 - Present Yes Hypertension ICD-10-CM: I10 
ICD-9-CM: 401.9  4/6/2019 - Present Yes Asthma (Chronic) ICD-10-CM: J45.909 ICD-9-CM: 493.90  5/19/2016 - Present Yes Overview Signed 4/7/2019  7:19 AM by GARRET [place] Last Assessment & Plan:  
Albuterol prn Borderline personality disorder ([facility] (Chronic) ICD-10-CM: F60.3 ICD-9-CM: 301.83  5/19/2016 - Present Yes Overview Signed 4/7/2019  7:19 AM by GARRET [place] Last Assessment & Plan:  
Continue Prozac, Seroquel, and trazodone Plan: · CIRA/ATN/ rhabdomyolysis:  Dialysis per nephrology-currently on a hold due to improvement, on steroid taper for rhabdomyositis · Myositis: wean steroid taper, PT/OT, has prn norco/morphine · Polysubstance use: needs cessation, continued folate and thiamine, nicotine patch · UTI: treated · RLE DVT: on coumadin, pharmacy dosing, followup daily INR at goal 
· Leukocytosis:  Partially steroid contribution, further infectious workup negative, followup CBC/ repeat CXR/UA has been negative · HTN: change norvasc to coreg due to edema and tachycardia, prn hydralazine · Toxic encephalopathy: resolved · Anemia: transfused 2 units with HD 4-22, CBC stable · Anxiety: continued xanax, added zoloft · Dyspnea: some contribution due to edema and weight gain and anxiety, discussed diuresis with nephrology who will evaluate DC planning/Dispo:  pending Diet:  DIET RENAL 
 DVT ppx:  Coumadin, INR 2.5 Signed: Lennox Aye, MD

## 2019-04-24 ENCOUNTER — APPOINTMENT (OUTPATIENT)
Dept: INTERVENTIONAL RADIOLOGY/VASCULAR | Age: 29
DRG: 871 | End: 2019-04-24
Attending: INTERNAL MEDICINE
Payer: SELF-PAY

## 2019-04-24 VITALS
BODY MASS INDEX: 38.36 KG/M2 | HEIGHT: 59 IN | HEART RATE: 120 BPM | SYSTOLIC BLOOD PRESSURE: 142 MMHG | OXYGEN SATURATION: 98 % | RESPIRATION RATE: 18 BRPM | DIASTOLIC BLOOD PRESSURE: 89 MMHG | TEMPERATURE: 98.8 F | WEIGHT: 190.3 LBS

## 2019-04-24 LAB
ANION GAP SERPL CALC-SCNC: 12 MMOL/L (ref 7–16)
BASOPHILS # BLD: 0.1 K/UL (ref 0–0.2)
BASOPHILS NFR BLD: 0 % (ref 0–2)
BUN SERPL-MCNC: 54 MG/DL (ref 6–23)
CALCIUM SERPL-MCNC: 7.7 MG/DL (ref 8.3–10.4)
CHLORIDE SERPL-SCNC: 110 MMOL/L (ref 98–107)
CO2 SERPL-SCNC: 23 MMOL/L (ref 21–32)
CREAT SERPL-MCNC: 2.66 MG/DL (ref 0.6–1)
DIFFERENTIAL METHOD BLD: ABNORMAL
EOSINOPHIL # BLD: 0.1 K/UL (ref 0–0.8)
EOSINOPHIL NFR BLD: 0 % (ref 0.5–7.8)
ERYTHROCYTE [DISTWIDTH] IN BLOOD BY AUTOMATED COUNT: 15.8 % (ref 11.9–14.6)
ETHANOL SERPL-MCNC: 129 MG/DL
GLUCOSE SERPL-MCNC: 94 MG/DL (ref 65–100)
HCT VFR BLD AUTO: 27.5 % (ref 35.8–46.3)
HGB BLD-MCNC: 8.9 G/DL (ref 11.7–15.4)
IMM GRANULOCYTES # BLD AUTO: 0.9 K/UL (ref 0–0.5)
IMM GRANULOCYTES NFR BLD AUTO: 4 % (ref 0–5)
INR PPP: 2
LYMPHOCYTES # BLD: 2.4 K/UL (ref 0.5–4.6)
LYMPHOCYTES NFR BLD: 11 % (ref 13–44)
MCH RBC QN AUTO: 30.3 PG (ref 26.1–32.9)
MCHC RBC AUTO-ENTMCNC: 32.4 G/DL (ref 31.4–35)
MCV RBC AUTO: 93.5 FL (ref 79.6–97.8)
MONOCYTES # BLD: 1.7 K/UL (ref 0.1–1.3)
MONOCYTES NFR BLD: 7 % (ref 4–12)
NEUTS SEG # BLD: 17.7 K/UL (ref 1.7–8.2)
NEUTS SEG NFR BLD: 78 % (ref 43–78)
NRBC # BLD: 0 K/UL (ref 0–0.2)
PLATELET # BLD AUTO: 466 K/UL (ref 150–450)
PMV BLD AUTO: 9.7 FL (ref 9.4–12.3)
POTASSIUM SERPL-SCNC: 3.8 MMOL/L (ref 3.5–5.1)
PROTHROMBIN TIME: 22.4 SEC (ref 11.7–14.5)
RBC # BLD AUTO: 2.94 M/UL (ref 4.05–5.2)
SODIUM SERPL-SCNC: 145 MMOL/L (ref 136–145)
WBC # BLD AUTO: 22.8 K/UL (ref 4.3–11.1)

## 2019-04-24 PROCEDURE — 80048 BASIC METABOLIC PNL TOTAL CA: CPT

## 2019-04-24 PROCEDURE — 0JPTXXZ REMOVAL OF TUNNELED VASCULAR ACCESS DEVICE FROM TRUNK SUBCUTANEOUS TISSUE AND FASCIA, EXTERNAL APPROACH: ICD-10-PCS | Performed by: PHYSICIAN ASSISTANT

## 2019-04-24 PROCEDURE — 74011250636 HC RX REV CODE- 250/636: Performed by: FAMILY MEDICINE

## 2019-04-24 PROCEDURE — 36589 REMOVAL TUNNELED CV CATH: CPT

## 2019-04-24 PROCEDURE — 85610 PROTHROMBIN TIME: CPT

## 2019-04-24 PROCEDURE — 80307 DRUG TEST PRSMV CHEM ANLYZR: CPT

## 2019-04-24 PROCEDURE — 85025 COMPLETE CBC W/AUTO DIFF WBC: CPT

## 2019-04-24 RX ORDER — WARFARIN 3 MG/1
3 TABLET ORAL EVERY EVENING
Qty: 7 TAB | Refills: 0 | Status: SHIPPED | OUTPATIENT
Start: 2019-04-24 | End: 2019-04-24

## 2019-04-24 RX ORDER — CARVEDILOL 6.25 MG/1
6.25 TABLET ORAL 2 TIMES DAILY WITH MEALS
Qty: 60 TAB | Refills: 0 | Status: SHIPPED | OUTPATIENT
Start: 2019-04-24

## 2019-04-24 RX ORDER — FUROSEMIDE 20 MG/1
20 TABLET ORAL DAILY
Qty: 7 TAB | Refills: 0 | Status: SHIPPED | OUTPATIENT
Start: 2019-04-24

## 2019-04-24 RX ORDER — FOLIC ACID 1 MG/1
1 TABLET ORAL DAILY
Qty: 30 TAB | Refills: 0 | Status: SHIPPED | OUTPATIENT
Start: 2019-04-24

## 2019-04-24 RX ORDER — SERTRALINE HYDROCHLORIDE 50 MG/1
50 TABLET, FILM COATED ORAL EVERY EVENING
Qty: 30 TAB | Refills: 0 | Status: SHIPPED | OUTPATIENT
Start: 2019-04-24

## 2019-04-24 RX ORDER — PREDNISONE 10 MG/1
10 TABLET ORAL
Qty: 3 TAB | Refills: 0 | Status: SHIPPED | OUTPATIENT
Start: 2019-04-24

## 2019-04-24 RX ORDER — LANOLIN ALCOHOL/MO/W.PET/CERES
100 CREAM (GRAM) TOPICAL DAILY
Qty: 30 TAB | Refills: 0 | Status: SHIPPED | OUTPATIENT
Start: 2019-04-24

## 2019-04-24 RX ADMIN — MORPHINE SULFATE 2 MG: 2 INJECTION, SOLUTION INTRAMUSCULAR; INTRAVENOUS at 03:25

## 2019-04-24 RX ADMIN — Medication 30 ML: at 03:28

## 2019-04-24 NOTE — PROGRESS NOTES
Renal Progress Note Admission Date: 4/6/2019 Follow up CIRA Subjective:  
  
Patient seen and examined on rounds, friend at the bedside she reports good UOP, admits to drinking alcohol and using methamphetamines in the last 24 hours in the hospital. She reports she does not want to quite drinking or using at this time and wants to leave AMA, she is willing to follow up with outpatient Nephrology. She reports has been to [facility] for detox in the past.  
 
ROS: 
General: no fever, chills,  
Lung: SOB with exertion CV: no CP 
GI: No N/V/D 
: good UOP 1250 ml last 24 hrs Ext: 1+ LE edema Objective:  
 
Physical Exam:   
Patient Vitals for the past 8 hrs: 
 BP Temp Pulse Resp SpO2 Weight 04/24/19 0720 131/87 98.2 °F (36.8 °C) (!) 117 18 97 %   
04/24/19 0300 130/75 98.2 °F (36.8 °C) (!) 111 17 95 % 86.3 kg (190 lb 4.8 oz) Exam:  
Gen: comfortable , NAD, alert and oriented HEENT: Dry membranes CV: S1, S2, no rub Lungs: Clear bilaterally Extem: 2+ edema Current Facility-Administered Medications Medication Dose Route Frequency  carvedilol (COREG) tablet 6.25 mg  6.25 mg Oral BID WITH MEALS  predniSONE (DELTASONE) tablet 20 mg  20 mg Oral DAILY WITH BREAKFAST  furosemide (LASIX) tablet 20 mg  20 mg Oral DAILY  0.9% sodium chloride infusion 250 mL  250 mL IntraVENous PRN  
 0.9% sodium chloride infusion 250 mL  250 mL IntraVENous PRN  
 sertraline (ZOLOFT) tablet 50 mg  50 mg Oral QPM  
 warfarin (COUMADIN) tablet 3 mg  3 mg Oral QPM  
 ALPRAZolam (XANAX) tablet 1 mg  1 mg Oral BID  
 0.9% sodium chloride infusion 250 mL  250 mL IntraVENous PRN  
 morphine injection 2 mg  2 mg IntraVENous Q4H PRN  
 hydrALAZINE (APRESOLINE) 20 mg/mL injection 10 mg  10 mg IntraVENous Q6H PRN  
 diphenhydrAMINE (BENADRYL) capsule 25 mg  25 mg Oral DIALYSIS PRN  
 HYDROcodone-acetaminophen (NORCO)  mg tablet 1 Tab  1 Tab Oral Q6H PRN  
  diphenhydrAMINE (BENADRYL) capsule 25 mg  25 mg Oral Q6H PRN  thiamine HCL (B-1) tablet 100 mg  100 mg Oral DAILY  folic acid (FOLVITE) tablet 1 mg  1 mg Oral DAILY  sodium chloride (NS) flush 5-10 mL  5-10 mL IntraVENous PRN  
 ondansetron (ZOFRAN) injection 4 mg  4 mg IntraVENous Q6H PRN  
 LORazepam (ATIVAN) injection 1 mg  1 mg IntraVENous Q6H PRN  
 naloxone (NARCAN) injection 0.4 mg  0.4 mg IntraVENous EVERY 2 MINUTES AS NEEDED Data Review:  
 
LABS:  
Recent Results (from the past 12 hour(s)) PROTHROMBIN TIME + INR Collection Time: 04/24/19  3:23 AM  
Result Value Ref Range Prothrombin time 22.4 (H) 11.7 - 14.5 sec INR 2.0    
CBC WITH AUTOMATED DIFF Collection Time: 04/24/19  3:23 AM  
Result Value Ref Range WBC 22.8 (H) 4.3 - 11.1 K/uL  
 RBC 2.94 (L) 4.05 - 5.2 M/uL HGB 8.9 (L) 11.7 - 15.4 g/dL HCT 27.5 (L) 35.8 - 46.3 % MCV 93.5 79.6 - 97.8 FL  
 MCH 30.3 26.1 - 32.9 PG  
 MCHC 32.4 31.4 - 35.0 g/dL  
 RDW 15.8 (H) 11.9 - 14.6 % PLATELET 422 (H) 649 - 450 K/uL MPV 9.7 9.4 - 12.3 FL ABSOLUTE NRBC 0.00 0.0 - 0.2 K/uL  
 DF AUTOMATED NEUTROPHILS 78 43 - 78 % LYMPHOCYTES 11 (L) 13 - 44 % MONOCYTES 7 4.0 - 12.0 % EOSINOPHILS 0 (L) 0.5 - 7.8 % BASOPHILS 0 0.0 - 2.0 % IMMATURE GRANULOCYTES 4 0.0 - 5.0 %  
 ABS. NEUTROPHILS 17.7 (H) 1.7 - 8.2 K/UL  
 ABS. LYMPHOCYTES 2.4 0.5 - 4.6 K/UL  
 ABS. MONOCYTES 1.7 (H) 0.1 - 1.3 K/UL  
 ABS. EOSINOPHILS 0.1 0.0 - 0.8 K/UL  
 ABS. BASOPHILS 0.1 0.0 - 0.2 K/UL  
 ABS. IMM. GRANS. 0.9 (H) 0.0 - 0.5 K/UL METABOLIC PANEL, BASIC Collection Time: 04/24/19  3:23 AM  
Result Value Ref Range Sodium 145 136 - 145 mmol/L Potassium 3.8 3.5 - 5.1 mmol/L Chloride 110 (H) 98 - 107 mmol/L  
 CO2 23 21 - 32 mmol/L Anion gap 12 7 - 16 mmol/L Glucose 94 65 - 100 mg/dL BUN 54 (H) 6 - 23 MG/DL Creatinine 2.66 (H) 0.6 - 1.0 MG/DL  
 GFR est AA 27 (L) >60 ml/min/1.73m2 GFR est non-AA 22 (L) >60 ml/min/1.73m2 Calcium 7.7 (L) 8.3 - 10.4 MG/DL  
ETHYL ALCOHOL Collection Time: 04/24/19  3:23 AM  
Result Value Ref Range ALCOHOL(ETHYL),SERUM 129 MG/DL Plan:  
 
Principal Problem: 
  Sepsis ([facility] (4/6/2019) Active Problems: 
  Hepatitis (4/6/2019) Substance use disorder (4/6/2019) Hip pain (4/6/2019) Hyponatremia (4/6/2019) CIRA (acute kidney injury) ([facility] (4/6/2019) UTI (urinary tract infection) (4/6/2019) Hypertension (4/6/2019) Rhabdomyolysis (4/7/2019) Asthma (5/19/2016) Overview: Last Assessment & Plan:  
    Albuterol prn Borderline personality disorder ([facility] (5/19/2016) Overview: Last Assessment & Plan:  
    Continue Prozac, Seroquel, and trazodone Encephalopathy, toxic (4/8/2019) Toxic metabolic encephalopathy (9/9/9756) CIRA with rhabdo(meth use) - required dialysis. First HD  was 4/10/2019 . Initially anuric. Urine has been increasing and clearance improving. Last dialysis was 4/19/19.  
-Renal function continues to improve creatinine 2.66- non oliguria. TCC to be removed today by IR. -pt want to leave AMA discussed risk factors she is willing to follow up with outpatient nephrology- will set up 2 week FU 
-DW hospitalist and nursing staff Anemia - s/p prbc Poly substance abuse 
-discussed pt reports using methamphetamines and alcohol in the last 24 hours, states does not want to quit at this time. 
-discussed outpatient resources for recovery

## 2019-04-24 NOTE — PROGRESS NOTES
In to assess patient, found her resting with eyes closed respirations even and unlabored, no s/sx of distress opens eyes and falls right back to sleep. Last pain medication given at 1205 ( norco  mg po) and 1530 ( morphine 2 mg IV). Had male visitor in room before shift change per dayshift RN report. Suspect may have self medicated. Will continue to monitor.

## 2019-04-24 NOTE — MED STUDENT NOTES
*ATTENTION:  This note has been created by a medical student for educational purposes only. Please do not refer to the content of this note for clinical decision-making, billing, or other purposes. Please see attending physicians note to obtain clinical information on this patient. * Hospitalist Progress Note Admit Date:  2019  4:59 PM  
Name:  Jc Mclaughlin Age:  26 y.o. 
:  1990 MRN:  [id number] PCP:  None Treatment Team: Attending Provider: Nubia Mcgrath MD; Consulting Provider: Alvin Redmond MD; Care Manager: Marcellus Finney, RN; Utilization Review: Catrachita Bruce, JULISSA; Consulting Provider: Bolivar Guerra MD; Consulting Provider: Rosa Ortega MD; Occupational Therapy Assistant: Albina Carrington 
 
HPI/Subjective:  
History gathered from patient chart and interview: 
 
\"Ms. Hernandez is a 34 yo female with PMH of polysubstance use who is evaluated with malaise and bilateral hip pain on 19 and admitted with rhabdomyolysis, CIRA, RLE DVT, acute encephalopathy, UTI. She had UDS showing amphetamines, benzo, opiates.  CT head negative. CXR negative. Rhabdomyolysis, likely was due to amphetamines, was managed with IVF without improvement and she has progressed to new dialysis start per nephrology. She has been seen by rheumatology/neurology, who recommends steroid taper for myositis. She continued to have LE pain, found to have right peroneal thrombus and has been managed with IV heparin to coumadin. She additionally had LLE pain/ decreased pulses with arterial duplex showing mild to moderate stenosis left femoral artery. She has been seen by vascular and no acute intervention needed.  V/Q scan low probability. UTI treated with antibiotics, cx negative. BC NGTD. ECHO negative.  CTAP showed pelvic fluid and possible PID- ID consulted. G/C negative.  GYN evaluated due to vaginal bleeding and there are no acute needs.  HIV and hepatitis are negative as well. \" 
 
4/24/19 Patient wishes to leave against medical advice. She admits that she has been drinking alcohol and using meth during her entire stay here. She has had several men bringing her drugs and alcohol. One of her \"friends\" complained today about other \"friends\" bringing alcohol and wanting to have no visitors in the room. A code gray was then called due to arguing and fear of fighting between the patient and her two \"friends\". Patient was tearful after the incident and decided that she was not ready to change and states she could \"teach the courses\". She has a safe place to go when she leaves although she says she will continue to drink and do meth. Patient denies suicidal ideation and self harm. Review of systems: 
 Patient admits to frequent urination, fatigue, and weakness. Objective:  
 
Patient Vitals for the past 24 hrs: 
 Temp Pulse Resp BP SpO2  
04/24/19 0720 98.2 °F (36.8 °C) (!) 117 18 131/87 97 % 04/24/19 0300 98.2 °F (36.8 °C) (!) 111 17 130/75 95 % 04/23/19 2300 98.1 °F (36.7 °C) 100 17 123/75 96 % 04/23/19 1900 98.4 °F (36.9 °C) (!) 110 17 126/80 96 % 04/23/19 1438 97.5 °F (36.4 °C) (!) 108 16 126/80 94 % 04/23/19 1043 97.9 °F (36.6 °C) (!) 108 18 135/86 98 % Oxygen Therapy O2 Sat (%): 97 % (04/24/19 0720) Pulse via Oximetry: 127 beats per minute (04/06/19 2119) O2 Device: Room air (04/18/19 1502) Intake/Output Summary (Last 24 hours) at 4/24/2019 0805 Last data filed at 4/24/2019 0300 Gross per 24 hour Intake 960 ml Output 1550 ml Net -590 ml *Note that automatically entered I/Os may not be accurate; dependent on patient compliance with collection and accurate  by techs. Exam performed by Dr. Malena Woods General:    Well nourished. Alert. CV:   RRR. No murmur, rub, or gallop. Lungs:   CTAB. No wheezing, rhonchi, or rales. Abdomen:   Soft, nontender, nondistended. Extremities: 3+ edema. Skin:     No rashes or jaundice. Neuro:  No gross focal deficits Psych:  Tearful Data Review: 
I have reviewed all labs, meds, and studies from the last 24 hours: 
 
Recent Results (from the past 24 hour(s)) PROTHROMBIN TIME + INR Collection Time: 04/24/19  3:23 AM  
Result Value Ref Range Prothrombin time 22.4 (H) 11.7 - 14.5 sec INR 2.0    
CBC WITH AUTOMATED DIFF Collection Time: 04/24/19  3:23 AM  
Result Value Ref Range WBC 22.8 (H) 4.3 - 11.1 K/uL  
 RBC 2.94 (L) 4.05 - 5.2 M/uL HGB 8.9 (L) 11.7 - 15.4 g/dL HCT 27.5 (L) 35.8 - 46.3 % MCV 93.5 79.6 - 97.8 FL  
 MCH 30.3 26.1 - 32.9 PG  
 MCHC 32.4 31.4 - 35.0 g/dL  
 RDW 15.8 (H) 11.9 - 14.6 % PLATELET 984 (H) 982 - 450 K/uL MPV 9.7 9.4 - 12.3 FL ABSOLUTE NRBC 0.00 0.0 - 0.2 K/uL  
 DF AUTOMATED NEUTROPHILS 78 43 - 78 % LYMPHOCYTES 11 (L) 13 - 44 % MONOCYTES 7 4.0 - 12.0 % EOSINOPHILS 0 (L) 0.5 - 7.8 % BASOPHILS 0 0.0 - 2.0 % IMMATURE GRANULOCYTES 4 0.0 - 5.0 %  
 ABS. NEUTROPHILS 17.7 (H) 1.7 - 8.2 K/UL  
 ABS. LYMPHOCYTES 2.4 0.5 - 4.6 K/UL  
 ABS. MONOCYTES 1.7 (H) 0.1 - 1.3 K/UL  
 ABS. EOSINOPHILS 0.1 0.0 - 0.8 K/UL  
 ABS. BASOPHILS 0.1 0.0 - 0.2 K/UL  
 ABS. IMM. GRANS. 0.9 (H) 0.0 - 0.5 K/UL METABOLIC PANEL, BASIC Collection Time: 04/24/19  3:23 AM  
Result Value Ref Range Sodium 145 136 - 145 mmol/L Potassium 3.8 3.5 - 5.1 mmol/L Chloride 110 (H) 98 - 107 mmol/L  
 CO2 23 21 - 32 mmol/L Anion gap 12 7 - 16 mmol/L Glucose 94 65 - 100 mg/dL BUN 54 (H) 6 - 23 MG/DL Creatinine 2.66 (H) 0.6 - 1.0 MG/DL  
 GFR est AA 27 (L) >60 ml/min/1.73m2 GFR est non-AA 22 (L) >60 ml/min/1.73m2 Calcium 7.7 (L) 8.3 - 10.4 MG/DL  
ETHYL ALCOHOL Collection Time: 04/24/19  3:23 AM  
Result Value Ref Range ALCOHOL(ETHYL),SERUM 129 MG/DL All Micro Results Procedure Component Value Units Date/Time CULTURE, BLOOD [[id number]] Collected:  04/07/19 1695 Order Status:  Completed Specimen:  Blood Updated:  19 5034 Special Requests: --     
  LEFT 
ARM Culture result: NO GROWTH 5 DAYS     
 CULTURE, BLOOD [[id number]] Collected:  19 Order Status:  Completed Specimen:  Blood Updated:  19 7805 Special Requests: --     
  LEFT 
HAND Culture result: NO GROWTH 5 DAYS     
 CULTURE, URINE [[id number]] Collected:  19 1857 Order Status:  Completed Specimen:  Cath Urine Updated:  19 4401 Special Requests: NO SPECIAL REQUESTS Culture result: NO GROWTH 2 DAYS     
 CULTURE, URINE [[id number]] Collected:  19 214 Order Status:  Canceled Specimen:  Urine from Clean catch CULTURE, BLOOD [[id number]] Collected:  19 Order Status:  Canceled Specimen:  Blood Results for orders placed or performed during the hospital encounter of 19  
2D ECHO COMPLETE ADULT (TTE) W OR WO CONTR Narrative [address] [place], [address] [facility] 
[phone] Transthoracic Echocardiogram 
2D, M-mode, Doppler, and Color Doppler Patient: Sully Siddiqi MR #: [id number] : 1990 Age: 33 years Gender: Female Study date: 2019 Account #: [de-identified] Height: 59 in 
Weight: 124.7 lb 
BSA: 1.51 mï¾² Status:Routine Location: 205 BP: 120/ 78 Allergies: PHENYTOIN SODIUM EXTENDED, LEVETIRACETAM 
 
Sonographer:  Bubba Doan RD Group:  [facility] Referring Physician:  Swetha Sarabia. Aisha Espinoza MD 
Reading Physician:  Ambreen Xavier MD [facility] [place]S: PVD. *Per patient- unable to turn onto left side due to blood clot in leg. * PROCEDURE: This was a routine study. A transthoracic echocardiogram was 
performed. The study included complete 2D imaging, M-mode, complete spectral 
Doppler, and color Doppler. Echocardiographic views were limited by  
restricted patient mobility and poor acoustic window availability. Image quality was 
adequate. LEFT VENTRICLE: Size was normal. Systolic function was normal. Ejection 
fraction was estimated in the range of 55 % to 60 %. There were no regional 
wall motion abnormalities. Wall thickness was normal. The E/e' ratio was 6.2. Left ventricular diastolic function parameters were normal. 
 
RIGHT VENTRICLE: The size was normal. Systolic function was normal. The 
tricuspid jet envelope definition was inadequate for estimation of RV  
systolic 
pressure. LEFT ATRIUM: Size was normal. 
 
RIGHT ATRIUM: Size was normal. 
 
SYSTEMIC VEINS: IVC: The inferior vena cava was normal in size and course. AORTIC VALVE: The valve was structurally normal, tri-commissural. There was  
no 
evidence for stenosis. There was no insufficiency. MITRAL VALVE: Valve structure was normal. There was no evidence for stenosis. There was trivial regurgitation. TRICUSPID VALVE: The valve structure was normal. There was no evidence for 
stenosis. There was no regurgitation. PULMONIC VALVE: Not well visualized. There was no evidence for stenosis. There 
was trivial regurgitation. PERICARDIUM: There was no pericardial effusion. AORTA: The root exhibited normal size. SUMMARY: 
 
-  Left ventricle: Systolic function was normal. Ejection fraction was 
estimated in the range of 55 % to 60 %. There were no regional wall motion 
abnormalities. SYSTEM MEASUREMENT TABLES 
 
2D mode AoR Diam (2D): 2.5 cm 
LA Dimension (2D): 3 cm Left Atrium Systolic Volume Index; Method of Disks, Biplane; 2D mode;: 15.2 
ml/m2 IVS/LVPW (2D): 1.1 IVSd (2D): 1 cm LVIDd (2D): 3.8 cm LVIDs (2D): 2.5 cm 
LVOT Area (2D): 3.1 cm2 LVPWd (2D): 0.9 cm Tissue Doppler Imaging LV Peak Early Charles Tissue Win; Lateral MA (TDI): 17.2 cm/s LV Peak Early Charles Tissue Win; Medial MA (TDI): 8.2 cm/s Unspecified Scan Mode Peak Grad; Mean; Antegrade Flow: 8 mm[Hg] Vmax; Antegrade Flow: 134 cm/s LVOT Diam: 2 cm 
MV Peak Win/LV Peak Tissue Win E-Wave; Lateral MA: 4 MV Peak Win/LV Peak Tissue Win E-Wave; Medial MA: 8.4 Prepared and signed by 
 
Chi Lindsey MD [facility] [place] Signed 08-Apr-2019 12:22:39 Current Meds: 
Current Facility-Administered Medications Medication Dose Route Frequency  carvedilol (COREG) tablet 6.25 mg  6.25 mg Oral BID WITH MEALS  predniSONE (DELTASONE) tablet 20 mg  20 mg Oral DAILY WITH BREAKFAST  furosemide (LASIX) tablet 20 mg  20 mg Oral DAILY  0.9% sodium chloride infusion 250 mL  250 mL IntraVENous PRN  
 0.9% sodium chloride infusion 250 mL  250 mL IntraVENous PRN  
 sertraline (ZOLOFT) tablet 50 mg  50 mg Oral QPM  
 warfarin (COUMADIN) tablet 3 mg  3 mg Oral QPM  
 ALPRAZolam (XANAX) tablet 1 mg  1 mg Oral BID  
 0.9% sodium chloride infusion 250 mL  250 mL IntraVENous PRN  
 morphine injection 2 mg  2 mg IntraVENous Q4H PRN  
 hydrALAZINE (APRESOLINE) 20 mg/mL injection 10 mg  10 mg IntraVENous Q6H PRN  
 diphenhydrAMINE (BENADRYL) capsule 25 mg  25 mg Oral DIALYSIS PRN  
 HYDROcodone-acetaminophen (NORCO)  mg tablet 1 Tab  1 Tab Oral Q6H PRN  
 diphenhydrAMINE (BENADRYL) capsule 25 mg  25 mg Oral Q6H PRN  thiamine HCL (B-1) tablet 100 mg  100 mg Oral DAILY  folic acid (FOLVITE) tablet 1 mg  1 mg Oral DAILY  sodium chloride (NS) flush 5-10 mL  5-10 mL IntraVENous PRN  
 ondansetron (ZOFRAN) injection 4 mg  4 mg IntraVENous Q6H PRN  
 LORazepam (ATIVAN) injection 1 mg  1 mg IntraVENous Q6H PRN  
 naloxone (NARCAN) injection 0.4 mg  0.4 mg IntraVENous EVERY 2 MINUTES AS NEEDED Other Studies (last 24 hours): No results found. Assessment and Plan:  
 
Hospital Problems as of 4/24/2019 Date Reviewed: 4/7/2019 Codes Class Noted - Resolved POA Encephalopathy, toxic ICD-10-CM: G92 ICD-9-CM: 349.82  4/8/2019 - Present Unknown  Toxic metabolic encephalopathy [id number]: P52 
 ICD-9-CM: 349.82  4/8/2019 - Present Unknown Rhabdomyolysis ICD-10-CM: E78.97 ICD-9-CM: 728.88  4/7/2019 - Present Yes * (Principal) Sepsis ([facility] ICD-10-CM: A41.9 ICD-9-CM: 038.9, 995.91  4/6/2019 - Present Yes Hepatitis ICD-10-CM: K75.9 ICD-9-CM: 573.3  4/6/2019 - Present Yes Substance use disorder (Chronic) ICD-10-CM: F19.90 ICD-9-CM: 305.90  4/6/2019 - Present Yes Hip pain ICD-10-CM: M25.559 ICD-9-CM: 719.45  4/6/2019 - Present Yes Hyponatremia ICD-10-CM: E87.1 ICD-9-CM: 276.1  4/6/2019 - Present Yes CIRA (acute kidney injury) ([facility] ICD-10-CM: N17.9 ICD-9-CM: 584.9  4/6/2019 - Present Yes  
   
 UTI (urinary tract infection) ICD-10-CM: N39.0 ICD-9-CM: 599.0  4/6/2019 - Present Yes Hypertension ICD-10-CM: I10 
ICD-9-CM: 401.9  4/6/2019 - Present Yes Asthma (Chronic) ICD-10-CM: J45.909 ICD-9-CM: 493.90  5/19/2016 - Present Yes Overview Signed 4/7/2019  7:19 AM by GARRET Vincent Last Assessment & Plan:  
Albuterol prn Borderline personality disorder ([facility] (Chronic) ICD-10-CM: F60.3 ICD-9-CM: 301.83  5/19/2016 - Present Yes Overview Signed 4/7/2019  7:19 AM by GARRET Vincent Last Assessment & Plan:  
Continue Prozac, Seroquel, and trazodone Plan: 
Patient wants to leave AMA, paperwork started. IR to remove catheter Consult Nephrology for discharge orders and follow up Provide follow up with PCP Discharge medications Prednisone taper: 20 mg 3 days, 10 mg 3 days, 5 mg 3 days Furosemide 20 mg x 30 days Warfarin 3 mg 
 
DC planning/Dispo:  Patient is leaving AMA Diet:  DIET RENAL 
DVT ppx:  warfarin Signed: 
Ellie Clemons

## 2019-04-24 NOTE — DISCHARGE SUMMARY
Hospitalist Discharge Summary     Admit Date:  2019  4:59 PM   Name:  Nereyda Block   Age:  26 y.o.  :  1990   MRN:  [id number]   PCP:  None  Treatment Team: Attending Provider: Calvin Patel MD; Consulting Provider: Agustin Steiner MD; Care Manager: Bernard Horton, RN; Utilization Review: Ton Juan RN; Consulting Provider: Jazmin Mcfadden MD; Consulting Provider: Misael Almonte MD; Occupational Therapy Assistant: [facility]    Problem List for this Hospitalization:  Hospital Problems as of 2019 Date Reviewed: 2019          Codes Class Noted - Resolved POA    Encephalopathy, toxic ICD-10-CM: G92  ICD-9-CM: 349.82  2019 - Present Unknown        Toxic metabolic encephalopathy [id number]: G92  ICD-9-CM: 349.82  2019 - Present Unknown        Rhabdomyolysis ICD-10-CM: M62.82  ICD-9-CM: 728.88  2019 - Present Yes        * (Principal) Sepsis ([facility] ICD-10-CM: A41.9  ICD-9-CM: 038.9, 995.91  2019 - Present Yes        Hepatitis ICD-10-CM: K75.9  ICD-9-CM: 573.3  2019 - Present Yes        Substance use disorder (Chronic) ICD-10-CM: F19.90  ICD-9-CM: 305.90  2019 - Present Yes        Hip pain ICD-10-CM: M25.559  ICD-9-CM: 719.45  2019 - Present Yes        Hyponatremia ICD-10-CM: E87.1  ICD-9-CM: 276.1  2019 - Present Yes        CIRA (acute kidney injury) ([facility] ICD-10-CM: N17.9  ICD-9-CM: 584.9  2019 - Present Yes        UTI (urinary tract infection) ICD-10-CM: N39.0  ICD-9-CM: 599.0  2019 - Present Yes        Hypertension ICD-10-CM: I10  ICD-9-CM: 401.9  2019 - Present Yes        Asthma (Chronic) ICD-10-CM: J45.909  ICD-9-CM: 493.90  2016 - Present Yes    Overview Signed 2019  7:19 AM by GARRET Smith     Last Assessment & Plan:   Albuterol prn             Borderline personality disorder ([facility] (Chronic) ICD-10-CM: F60.3  ICD-9-CM: 301.83  2016 - Present Yes    Overview Signed 2019  7:19 AM by GARRET Lucero     Last Assessment & Plan:   Continue Prozac, Seroquel, and trazodone                         Hospital Course:    Ms. Max Yanes is a 34 yo female with PMH of polysubstance use who is evaluated with malaise and bilateral hip pain on 4-6-19 and admitted with rhabdomyolysis, CIRA, RLE DVT, acute encephalopathy, UTI. She had UDS showing amphetamines, benzo, opiates. CT head negative. CXR negative. Rhabdomyolysis, likely was due to amphetamines, was managed with IVF without improvement and she has progressed to new dialysis start per nephrology. She has been seen by rheumatology/neurology, who recommends steroid taper for myositis. She continued to have LE pain, found to have right peroneal thrombus and has been managed with IV heparin to coumadin. She additionally had LLE pain/ decreased pulses with arterial duplex showing mild to moderate stenosis left femoral artery. She has been seen by vascular and no acute intervention needed. V/Q scan low probability for PE.  UTI treated with antibiotics, cx negative. BC NGTD. ECHO negative. CTAP showed pelvic fluid and possible PID- ID consulted. G/C negative. GYN evaluated due to vaginal bleeding and there are no acute needs. HIV and hepatitis are negative as well. She received 2 units PRBC 4-22-19. She has significant anxiety with xanax and zoloft added. She has been noted to have numerous visitors with suspicion for ongoing drug and ETOH abuse while admitted. Her UDS and ETOH levels continue to be positive. She admits to ongoing use of both whiskey and amphetamines while admitted on daily basis. PROMISE STODDARD called 4-24-19 due to an argument with her friends. She has requested to leave AMA. IR will pull her right TCC. She will be provided prescriptions for steroid taper, coreg, thiamine and folate, and zoloft. but not coumadin due to her lack of followup and ongoing substance abuse and the fact that coumadin is a high risk medication.  She agrees that she likely will be unable to manage her coumadin. She declines assistance with substance abuse and states that she will continue to consume ETOH and amphetamines regularly. She denies suicidal or homicidal thoughts. Disposition: Home or Self Care  Activity: Activity as tolerated  Diet: DIET RENAL Regular; FR 1000ML    Follow up instructions, discharge meds at bottom of this note. Plan was discussed with patient. All questions answered. Patient was stable at time of discharge. Diagnostic Imaging/Tests:   Xr Chest Pa Lat    Result Date: 4/6/2019  EXAM: Chest x-ray. INDICATION: Chest pain. COMPARISON: April 1, 2013. TECHNIQUE: Two view chest. FINDINGS: The lungs are clear. The cardiac size, mediastinal contour and pulmonary vasculature are normal. No pneumothorax or pleural effusion is seen. IMPRESSION: Normal chest x-ray. Xr Hip Lt W Or Wo Pelv 2-3 Vws    Result Date: 4/7/2019  EXAM: Left hip x-rays. INDICATION: Pain. COMPARISON: None. TECHNIQUE: 2 views. FINDINGS: The left hip joint has a normal appearance. No fracture, arthritic change or radiographic evidence of avascular necrosis is identified. The soft tissues are unremarkable. IMPRESSION: Negative left hip x-rays. Xr Hip Rt W Or Wo Pelv 2-3 Vws    Result Date: 4/7/2019  EXAM: Pelvis and right hip x-rays. INDICATION: Pain. COMPARISON: None. TECHNIQUE: A frontal x-ray of the pelvis was supplemented with 2 dedicated views of the right hip joint. FINDINGS: The pelvic bones and right hip joint have a normal appearance. No acute fracture, dislocation or arthritic changes are identified. There is no radiographic evidence of femoral head avascular necrosis. The soft tissues are unremarkable. IMPRESSION: Negative pelvis and right hip x-rays. Mri Lumb Spine Wo Cont    Result Date: 4/7/2019  MRI of the Lumbar Spine INDICATION: Increasing right lower extremity pain and weakness, history of drug abuse.  Standard MRI sequences were obtained through the lumbar spine in multiple planes. FINDINGS: There is no significant disc disease. There is no disc space narrowing. There is no disc herniation. There is no evidence of discitis. Spinal canal and neural foramen are widely patent at all levels in the lumbar spine. There is normal signal in the distal spinal cord. There are no bony lesions. There are no fractures in the lumbar spine. There is presacral fluid/edema. This was not seen on the CT scan from 03/28/2019. IMPRESSION: 1. Lumbar spine is unremarkable. 2.  Developing presacral fluid/edema, uncertain significance. Suggest CT of the pelvis with IV contrast.     Ct Head Wo Cont    Result Date: 4/6/2019  EXAM: Noncontrast CT head. INDICATION: Pain and mental status changes. COMPARISON: October 21, 2015. TECHNIQUE: Noncontrast CT images of the head were obtained. Radiation dose reduction techniques were used for this study. Our CT scanners use one or all of the following:  Automated exposure control, adjustment of the mA or kV according to patient size, iterative reconstruction. FINDINGS: Brain volume is appropriate for age. No acute infarct, hemorrhage or mass is identified. There is no mass effect, midline shift or depressed fracture. The visualized paranasal sinuses and mastoid air cells are clear. IMPRESSION: No acute process. Ct Abd Pelv Wo Cont    Result Date: 4/8/2019  EXAM: CT abdomen and pelvis without IV contrast. INDICATION: Pain. COMPARISON: Prior CT abdomen and pelvis on March 28, 2019. TECHNIQUE: Axial CT images of abdomen and pelvis were obtained after oral contrast. No IV contrast was administered. Radiation dose reduction techniques were used for this study. Our CT scanners use one or all of the following: Automated exposure control, adjustment of the mA and/or kV according to patient size, iterative reconstruction. FINDINGS: There is new fluid and stranding throughout the pelvic fat.  No focal abscess or associated gas is identified. The urinary bladder is decompressed around a Smith catheter. The uterus is present. The ovaries are grossly normal.  The gallbladder is mildly distended, but otherwise normal in appearance. No calcified gallstones are identified. The liver, pancreas, spleen, adrenal glands, kidneys, bowel, appendix and abdominal aorta are within normal limits. The lung bases are clear. The bony structures are unremarkable. New edematous changes are also noted in the subcutaneous tissue of the pelvis and bilateral buttock muscles, without associated gas. IMPRESSION: 1. New fluid and stranding throughout the pelvic fat, of unclear etiology. Differential considerations would include pelvic inflammatory disease or the sequela of a ruptured ovarian cyst. 2. There are also new edematous changes in the subcutaneous tissue of the pelvis and bilateral buttock muscles. Correlate for myositis. Us Abd Comp    Result Date: 4/6/2019  EXAM: Complete abdominal ultrasound. INDICATION: Pain. COMPARISON: None. TECHNIQUE: Standard protocol limited right upper quadrant abdomen ultrasound. FINDINGS: - Liver: Within normal limits. - Gallbladder: Within normal limits. - Bile ducts: Within normal limits. Measures 4.2 mm. - Pancreas: Within normal limits. - Bilateral kidneys: Within normal limits. The right kidney measures 9.7 cm in the left kidney measures 8.4 cm. - Aorta and IVC: Within normal limits. - Portal vein: Within normal limits. - Other: The spleen is normal, measuring 7.9 cm. There is no ascites. IMPRESSION: Unremarkable study. Duplex Low Ext Arteries With Lala Marifer    Result Date: 4/8/2019  History: Decreased pedal pulses on the left side. FINDINGS: Duplex Doppler ultrasound was performed of the lower extremities bilaterally. Mildly elevated velocity in the distal right superficial femoral artery. Patent anterior tibial and posterior tibial arteries on the right side.  Turbulent flow in the left common femoral artery. Patent left profunda femoral artery. Elevated velocity in the left superficial femoral artery at the mid segment measuring 199 cm/s. Dampened waveform in the left popliteal artery. Patent left posterior tibial and anterior tibial arteries. Right ankle brachial index of 0.98. Left ankle brachial index of 0.96. Right toe index of 0.19. Dampened waveforms in the right toe. No waveform demonstrated in the left great toe. IMPRESSION: Evidence to suggest arterial occlusive disease in the small vessels of the left foot. Mild to moderate stenosis in the mid left superficial femoral artery. Turbulent flow in the left common femoral artery raises the possibility of atherosclerotic plaque or stenosis in the left external iliac artery. Duplex Lower Ext Venous Bilat    Result Date: 2019  Bilateral lower extremity venous ultrasound INDICATION:  Pain and swelling, Doppler ultrasound of both lower extremities was performed. FINDINGS:  There is normal flow in the common femoral, superficial femoral, and popliteal veins. Normal compression and augmentation is demonstrated. There is normal flow in the left calf veins. In the right calf, the peroneal vein is thrombosed. IMPRESSION: 1. No evidence of DVT in the left leg. 2.  Thrombosis of the right peroneal vein. No thrombus is seen about the knee.        Echocardiogram results:  Results for orders placed or performed during the hospital encounter of 19   2D ECHO COMPLETE ADULT (TTE) W OR [address]  [phone]    Transthoracic Echocardiogram  2D, M-mode, Doppler, and Color Doppler    Patient: Poli Watson  MR #: [id number]  : 1990  Age: 33 years  Gender: Female  Study date: 2019  Account #: [de-identified]  Height: 59 in  Weight: 124.7 lb  BSA: 1.51 mï¾²  Status:Routine  Location: 205  BP: 120/ 78    Allergies: PHENYTOIN SODIUM EXTENDED, LEVETIRACETAM    Sonographer:  Pat Godoy RDCS  Group:  [facility]  Referring Physician:  Alla Green. Jose Luis Logan MD  Reading Physician:  Percy Mosley. Giselle Owen MD [facility] [place]    INDICATIONS: PVD. *Per patient- unable to turn onto left side due to blood clot in leg. *    PROCEDURE: This was a routine study. A transthoracic echocardiogram was  performed. The study included complete 2D imaging, M-mode, complete spectral  Doppler, and color Doppler. Echocardiographic views were limited by   restricted  patient mobility and poor acoustic window availability. Image quality was  adequate. LEFT VENTRICLE: Size was normal. Systolic function was normal. Ejection  fraction was estimated in the range of 55 % to 60 %. There were no regional  wall motion abnormalities. Wall thickness was normal. The E/e' ratio was 6.2. Left ventricular diastolic function parameters were normal.    RIGHT VENTRICLE: The size was normal. Systolic function was normal. The  tricuspid jet envelope definition was inadequate for estimation of RV   systolic  pressure. LEFT ATRIUM: Size was normal.    RIGHT ATRIUM: Size was normal.    SYSTEMIC VEINS: IVC: The inferior vena cava was normal in size and course. AORTIC VALVE: The valve was structurally normal, tri-commissural. There was   no  evidence for stenosis. There was no insufficiency. MITRAL VALVE: Valve structure was normal. There was no evidence for stenosis. There was trivial regurgitation. TRICUSPID VALVE: The valve structure was normal. There was no evidence for  stenosis. There was no regurgitation. PULMONIC VALVE: Not well visualized. There was no evidence for stenosis. There  was trivial regurgitation. PERICARDIUM: There was no pericardial effusion. AORTA: The root exhibited normal size. SUMMARY:    -  Left ventricle: Systolic function was normal. Ejection fraction was  estimated in the range of 55 % to 60 %.  There were no regional wall motion  abnormalities. SYSTEM MEASUREMENT TABLES    2D mode  AoR Diam (2D): 2.5 cm  LA Dimension (2D): 3 cm  Left Atrium Systolic Volume Index; Method of Disks, Biplane; 2D mode;: 15.2  ml/m2  IVS/LVPW (2D): 1.1  IVSd (2D): 1 cm  LVIDd (2D): 3.8 cm  LVIDs (2D): 2.5 cm  LVOT Area (2D): 3.1 cm2  LVPWd (2D): 0.9 cm    Tissue Doppler Imaging  LV Peak Early Charles Tissue Win; Lateral MA (TDI): 17.2 cm/s  LV Peak Early Charles Tissue Win; Medial MA (TDI): 8.2 cm/s    Unspecified Scan Mode  Peak Grad; Mean; Antegrade Flow: 8 mm[Hg]  Vmax; Antegrade Flow: 134 cm/s  LVOT Diam: 2 cm  MV Peak Win/LV Peak Tissue Win E-Wave; Lateral MA: 4  MV Peak Win/LV Peak Tissue Win E-Wave; Medial MA: 8.4    Prepared and signed by    Ivania Pederson.  Brandyn Melo MD [facility] [place]  Signed 08-Apr-2019 12:22:39         Procedures done this admission:  * No surgery found *    All Micro Results     Procedure Component Value Units Date/Time    CULTURE, BLOOD [[id number]] Collected:  04/07/19 2130    Order Status:  Completed Specimen:  Blood Updated:  04/13/19 0637     Special Requests: --        LEFT  ARM       Culture result: NO GROWTH 5 DAYS       CULTURE, BLOOD [[id number]] Collected:  04/06/19 2032    Order Status:  Completed Specimen:  Blood Updated:  04/11/19 0708     Special Requests: --        LEFT  HAND       Culture result: NO GROWTH 5 DAYS       CULTURE, URINE [[id number]] Collected:  04/06/19 1857    Order Status:  Completed Specimen:  Cath Urine Updated:  04/09/19 0645     Special Requests: NO SPECIAL REQUESTS        Culture result: NO GROWTH 2 DAYS       CULTURE, URINE [[id number]] Collected:  04/06/19 2145    Order Status:  Canceled Specimen:  Urine from Clean catch     CULTURE, BLOOD [[id number]] Collected:  04/06/19 2000    Order Status:  Canceled Specimen:  Blood           Labs: Results:       BMP, Mg, Phos Recent Labs     04/24/19  0323 04/23/19  0528 04/22/19  0537    144 143   K 3.8 4.4 4.8   * 110* 109*   CO2 23 27 27   AGAP 12 7 7   BUN 54* 64* 71*   CREA 2.66* 3.36* 3.88*   CA 7.7* 7.9* 7.8*   GLU 94 114* 88      CBC Recent Labs     04/24/19  0323 04/23/19  0528 04/22/19  0537   WBC 22.8* 22.2* 26.2*   RBC 2.94* 2.90* 2.22*   HGB 8.9* 8.7* 6.8*   HCT 27.5* 27.3* 21.6*   * 393 477*   GRANS 78 81* 81*   LYMPH 11* 8* 9*   EOS 0* 0* 1   MONOS 7 6 6   BASOS 0 0 0   IG 4 4 3   ANEU 17.7* 18.0* 21.2*   ABL 2.4 1.8 2.5   MATTY 0.1 0.1 0.2   ABM 1.7* 1.4* 1.4*   ABB 0.1 0.1 0.0   AIG 0.9* 0.8* 0.9*      LFT No results for input(s): SGOT, ALT, TBIL, AP, TP, ALB, GLOB, AGRAT, GPT in the last 72 hours.    Cardiac Testing Lab Results   Component Value Date/Time     04/18/2019 05:54 AM     (H) 04/17/2019 05:01 AM     (H) 04/16/2019 05:59 AM      Coagulation Tests Lab Results   Component Value Date/Time    Prothrombin time 22.4 (H) 04/24/2019 03:23 AM    Prothrombin time 26.3 (H) 04/23/2019 05:28 AM    Prothrombin time 28.8 (H) 04/22/2019 05:37 AM    INR 2.0 04/24/2019 03:23 AM    INR 2.5 04/23/2019 05:28 AM    INR 2.8 04/22/2019 05:37 AM    aPTT 37.2 04/17/2019 05:01 AM    aPTT 154.9 (H) 04/16/2019 05:59 AM    aPTT 84.4 (H) 04/15/2019 10:35 PM      A1c No results found for: HBA1C, HGBE8, FYS4XXGW, RAR6PLMI   Lipid Panel No results found for: CHOL, CHOLPOCT, CHOLX, CHLST, CHOLV, 321575, HDL, LDL, LDLC, DLDLP, 457884, VLDLC, VLDL, TGLX, TRIGL, TRIGP, TGLPOCT, CHHD, CHHDX   Thyroid Panel No results found for: TSH, T4, FT4, TT3, T3U, TSHEXT, TSHEXT     Most Recent UA Lab Results   Component Value Date/Time    Color YELLOW 04/19/2019 12:58 PM    Appearance CLEAR 04/19/2019 12:58 PM    Specific gravity 1.014 04/19/2019 12:58 PM    pH (UA) 7.0 04/19/2019 12:58 PM    Protein 30 (A) 04/19/2019 12:58 PM    Glucose 250 04/19/2019 12:58 PM    Ketone NEGATIVE  04/19/2019 12:58 PM    Bilirubin NEGATIVE  04/19/2019 12:58 PM    Blood MODERATE (A) 04/19/2019 12:58 PM    Urobilinogen 0.2 04/19/2019 12:58 PM    Nitrites NEGATIVE  04/19/2019 12:58 PM    Leukocyte Esterase SMALL (A) 04/19/2019 12:58 PM    WBC 5-10 04/19/2019 12:58 PM    RBC 0-3 04/19/2019 12:58 PM    Epithelial cells 0 04/06/2019 06:57 PM    Bacteria 0 04/19/2019 12:58 PM    Casts 0-3 04/19/2019 12:58 PM    Crystals, urine 0 04/06/2019 06:57 PM    Mucus 2+ (H) 04/06/2019 06:57 PM    Other observations RESULTS VERIFIED MANUALLY 04/06/2019 06:57 PM        Allergies   Allergen Reactions    Dilantin [Phenytoin Sodium Extended] Itching    Keppra [Levetiracetam] Rash       There is no immunization history on file for this patient. All Labs from Last 24 Hrs:  Recent Results (from the past 24 hour(s))   PROTHROMBIN TIME + INR    Collection Time: 04/24/19  3:23 AM   Result Value Ref Range    Prothrombin time 22.4 (H) 11.7 - 14.5 sec    INR 2.0     CBC WITH AUTOMATED DIFF    Collection Time: 04/24/19  3:23 AM   Result Value Ref Range    WBC 22.8 (H) 4.3 - 11.1 K/uL    RBC 2.94 (L) 4.05 - 5.2 M/uL    HGB 8.9 (L) 11.7 - 15.4 g/dL    HCT 27.5 (L) 35.8 - 46.3 %    MCV 93.5 79.6 - 97.8 FL    MCH 30.3 26.1 - 32.9 PG    MCHC 32.4 31.4 - 35.0 g/dL    RDW 15.8 (H) 11.9 - 14.6 %    PLATELET 701 (H) 133 - 450 K/uL    MPV 9.7 9.4 - 12.3 FL    ABSOLUTE NRBC 0.00 0.0 - 0.2 K/uL    DF AUTOMATED      NEUTROPHILS 78 43 - 78 %    LYMPHOCYTES 11 (L) 13 - 44 %    MONOCYTES 7 4.0 - 12.0 %    EOSINOPHILS 0 (L) 0.5 - 7.8 %    BASOPHILS 0 0.0 - 2.0 %    IMMATURE GRANULOCYTES 4 0.0 - 5.0 %    ABS. NEUTROPHILS 17.7 (H) 1.7 - 8.2 K/UL    ABS. LYMPHOCYTES 2.4 0.5 - 4.6 K/UL    ABS. MONOCYTES 1.7 (H) 0.1 - 1.3 K/UL    ABS. EOSINOPHILS 0.1 0.0 - 0.8 K/UL    ABS. BASOPHILS 0.1 0.0 - 0.2 K/UL    ABS. IMM.  GRANS. 0.9 (H) 0.0 - 0.5 K/UL   METABOLIC PANEL, BASIC    Collection Time: 04/24/19  3:23 AM   Result Value Ref Range    Sodium 145 136 - 145 mmol/L    Potassium 3.8 3.5 - 5.1 mmol/L    Chloride 110 (H) 98 - 107 mmol/L    CO2 23 21 - 32 mmol/L    Anion gap 12 7 - 16 mmol/L    Glucose 94 65 - 100 mg/dL    BUN 54 (H) 6 - 23 MG/DL    Creatinine 2.66 (H) 0.6 - 1.0 MG/DL    GFR est AA 27 (L) >60 ml/min/1.73m2    GFR est non-AA 22 (L) >60 ml/min/1.73m2    Calcium 7.7 (L) 8.3 - 10.4 MG/DL   ETHYL ALCOHOL    Collection Time: 04/24/19  3:23 AM   Result Value Ref Range    ALCOHOL(ETHYL),SERUM 129 MG/DL       Current Med List in Hospital:   Current Facility-Administered Medications   Medication Dose Route Frequency    carvedilol (COREG) tablet 6.25 mg  6.25 mg Oral BID WITH MEALS    predniSONE (DELTASONE) tablet 20 mg  20 mg Oral DAILY WITH BREAKFAST    furosemide (LASIX) tablet 20 mg  20 mg Oral DAILY    0.9% sodium chloride infusion 250 mL  250 mL IntraVENous PRN    0.9% sodium chloride infusion 250 mL  250 mL IntraVENous PRN    sertraline (ZOLOFT) tablet 50 mg  50 mg Oral QPM    warfarin (COUMADIN) tablet 3 mg  3 mg Oral QPM    ALPRAZolam (XANAX) tablet 1 mg  1 mg Oral BID    0.9% sodium chloride infusion 250 mL  250 mL IntraVENous PRN    morphine injection 2 mg  2 mg IntraVENous Q4H PRN    hydrALAZINE (APRESOLINE) 20 mg/mL injection 10 mg  10 mg IntraVENous Q6H PRN    diphenhydrAMINE (BENADRYL) capsule 25 mg  25 mg Oral DIALYSIS PRN    HYDROcodone-acetaminophen (NORCO)  mg tablet 1 Tab  1 Tab Oral Q6H PRN    diphenhydrAMINE (BENADRYL) capsule 25 mg  25 mg Oral Q6H PRN    thiamine HCL (B-1) tablet 100 mg  100 mg Oral DAILY    folic acid (FOLVITE) tablet 1 mg  1 mg Oral DAILY    sodium chloride (NS) flush 5-10 mL  5-10 mL IntraVENous PRN    ondansetron (ZOFRAN) injection 4 mg  4 mg IntraVENous Q6H PRN    LORazepam (ATIVAN) injection 1 mg  1 mg IntraVENous Q6H PRN    naloxone (NARCAN) injection 0.4 mg  0.4 mg IntraVENous EVERY 2 MINUTES AS NEEDED       Discharge Exam:  Patient Vitals for the past 24 hrs:   Temp Pulse Resp BP SpO2   04/24/19 0720 98.2 °F (36.8 °C) (!) 117 18 131/87 97 %   04/24/19 0300 98.2 °F (36.8 °C) (!) 111 17 130/75 95 %   04/23/19 2300 98.1 °F (36.7 °C) 100 17 123/75 96 %   04/23/19 1900 98.4 °F (36.9 °C) (!) 110 17 126/80 96 %   04/23/19 1438 97.5 °F (36.4 °C) (!) 108 16 126/80 94 %   04/23/19 1043 97.9 °F (36.6 °C) (!) 108 18 135/86 98 %     Oxygen Therapy  O2 Sat (%): 97 % (04/24/19 0720)  Pulse via Oximetry: 127 beats per minute (04/06/19 2119)  O2 Device: Room air (04/18/19 1502)    Intake/Output Summary (Last 24 hours) at 4/24/2019 0810  Last data filed at 4/24/2019 0300  Gross per 24 hour   Intake 960 ml   Output 1550 ml   Net -590 ml       *Note that automatically entered I/Os may not be accurate; dependent on patient compliance with collection and accurate  by assistants. General:    Well nourished. Alert. CV:   Regular rate and rhythm. No murmur, rub, or gallop. 2-3+ edema  Lungs:  Clear to auscultation bilaterally. No wheezing, rhonchi, or rales. Abdomen: Soft,edematrous, Bowel sounds normal.   Extremities: Warm and dry. Neurologic:  grossly intact. Skin:     No rashes or jaundice. Psych:  Calm, A and O x 3       Discharge Info:   Current Discharge Medication List      START taking these medications    Details   carvedilol (COREG) 6.25 mg tablet Take 1 Tab by mouth two (2) times daily (with meals). Qty: 60 Tab, Refills: 0      folic acid (FOLVITE) 1 mg tablet Take 1 Tab by mouth daily. Qty: 30 Tab, Refills: 0      furosemide (LASIX) 20 mg tablet Take 1 Tab by mouth daily. Qty: 7 Tab, Refills: 0      predniSONE (DELTASONE) 10 mg tablet Take 10 mg by mouth daily (with breakfast). Qty: 3 Tab, Refills: 0      sertraline (ZOLOFT) 50 mg tablet Take 1 Tab by mouth every evening. Qty: 30 Tab, Refills: 0      thiamine HCL (B-1) 100 mg tablet Take 1 Tab by mouth daily. Qty: 30 Tab, Refills: 0         CONTINUE these medications which have NOT CHANGED    Details   albuterol (PROVENTIL HFA, VENTOLIN HFA, PROAIR HFA) 90 mcg/actuation inhaler Take 2 Puffs by inhalation every four (4) hours as needed for Wheezing. Indications: Asthma Attack             Follow Up Orders:   Follow-up Appointments Procedures    FOLLOW UP VISIT Appointment in: One Week 1 week [facility] nephrology, 2-3 days PCP     1 week [facility] nephrology, 2-3 days PCP     Standing Status:   Standing     Number of Occurrences:   1     Order Specific Question:   Appointment in     Answer: One Week       Follow-up Information     Follow up With Specialties Details Why Contact Info    None    None ([phone]) Patient stated that they have no PCP            Time spent in patient discharge planning and coordination 45 minutes.     Signed:  Vanessa Rodriguez MD

## 2019-04-24 NOTE — PROGRESS NOTES
Pt talkative and alert. When drawing blood from IJ, This RN smelled alcohol and asked pt if she had been drinking. Patient denied drinking anything other than water. Notified Dr. Robert Santoro and order received for ETOH blood level to be added to labs

## 2019-04-24 NOTE — PROGRESS NOTES
While doing bedside shift report, this RN and Jacinta Dean RN (night shift) smelled alcohol on the Patient and in her room. When confronted, the Patient confirmed that she had been drinking throughout the night. The Patient stated,\"I have had it in my room the entire night. It was in my backpack. He (referring to her male visitor) did not bring it to me. \" The bottle of alcohol was found to be in the trash, empty. Another male visitor (man called Jamarcusjanna Choe) approached the room, in tears, and yelled \"5 miles\" into the room and got on the elevator and left the floor. Shortly, the male visitor that had been in the room over night left on the elevator with his backpack on. A few minutes later, Violette, arrived on the floor and began kicking the Patients door and yelling. The Code Fanny Cote was called. Monalisa Morrison told Casimirobalaji Daija to leave and he got on the elevator and left. MD Jones Goyal came to the Patient's bedside for assessment.

## 2019-04-24 NOTE — DISCHARGE INSTRUCTIONS
Disposition: Home or Self Care  Activity: Activity as tolerated  Diet: DIET RENAL Regular; FR 1000ML    Patient Education        Sepsis: Care Instructions  Your Care Instructions    Sepsis is an intense reaction to an infection. It can cause deadly damage to the body and lead to a dangerously low blood pressure. You may have inflammation across large areas of your body. It can damage tissue and even go deep into your organs. Infections that can lead to sepsis include:  · A skin infection such as from a cut. · A lung infection like pneumonia. · A kidney infection. · A gut infection such as E. coli. It's important to care for yourself and try to avoid infections so that you don't get sepsis again. Follow-up care is a key part of your treatment and safety. Be sure to make and go to all appointments, and call your doctor if you are having problems. It's also a good idea to know your test results and keep a list of the medicines you take. How can you care for yourself at home? · If your doctor prescribed antibiotics, take them as directed. Do not stop taking them just because you feel better. You need to take the full course of antibiotics. · Help prevent infections that could lead to sepsis:  ? Try to avoid colds and flu. If you must be around people who have a cold or the flu, wash your hands often. And get a flu vaccine every year. ? Get a pneumococcal vaccine shot (to prevent pneumonia, meningitis, and other infections). If you have had one before, ask your doctor if you need another dose. ? Clean any wounds or scrapes. · Do not smoke or use other tobacco products. When you quit smoking, you are less likely to get a cold, the flu, bronchitis, and pneumonia. If you need help quitting, talk to your doctor about stop-smoking programs and medicines. These can increase your chances of quitting for good. · To prevent dehydration, drink plenty of fluids.  Choose water and other caffeine-free clear liquids until you feel better. If you have kidney, heart, or liver disease and have to limit fluids, talk with your doctor before you increase the amount of fluids you drink. · Eat a healthy diet. Include fruits, vegetables, and whole grains in your diet every day. · If your doctor recommends it, try doing some physical activity. Walking is a good choice. Bit by bit, increase the amount you walk every day. When should you call for help? [id number] anytime you think you may need emergency care. For example, call if:    · You passed out (lost consciousness).    Call your doctor now or seek immediate medical care if:    · You have symptoms of sepsis. These may include:  ? Shortness of breath. ? A fast heart rate. ? Cool, pale, or clammy skin. ? Feeling confused.     · You are dizzy or lightheaded, or you feel like you may faint.     · You have a fever or chills.    Watch closely for changes in your health, and be sure to contact your doctor if:    · You do not get better as expected. Where can you learn more? Go to http://farheen-tatyana.info/. Enter Z924 in the search box to learn more about \"Sepsis: Care Instructions. \"  Current as of: September 23, 2018  Content Version: 11.9  © 8809-7894 [email or web address]. Care instructions adapted under license by [employer] (which disclaims liability or warranty for this information). If you have questions about a medical condition or this instruction, always ask your healthcare professional. [place] [address] any warranty or liability for your use of this information.          DISCHARGE SUMMARY from Nurse    PATIENT INSTRUCTIONS:    After general anesthesia or intravenous sedation, for 24 hours or while taking prescription Narcotics:  · Limit your activities  · Do not drive and operate hazardous machinery  · Do not make important personal or business decisions  · Do  not drink alcoholic beverages  · If you have not urinated within 8 hours after discharge, please contact your surgeon on call. Report the following to your surgeon:  · Excessive pain, swelling, redness or odor of or around the surgical area  · Temperature over 100.5  · Nausea and vomiting lasting longer than 4 hours or if unable to take medications  · Any signs of decreased circulation or nerve impairment to extremity: change in color, persistent  numbness, tingling, coldness or increase pain  · Any questions    What to do at Home:  Recommended activity: Activity as tolerated. If you experience any of the following symptoms:  fever greater than 100.5,  uncontrolled pain, nausea/ vomiting,   please follow up with your doctor. *  Please give a list of your current medications to your Primary Care Provider. *  Please update this list whenever your medications are discontinued, doses are      changed, or new medications (including over-the-counter products) are added. *  Please carry medication information at all times in case of emergency situations. These are general instructions for a healthy lifestyle:    No smoking/ No tobacco products/ Avoid exposure to second hand smoke  Surgeon General's Warning:  Quitting smoking now greatly reduces serious risk to your health. Obesity, smoking, and sedentary lifestyle greatly increases your risk for illness    A healthy diet, regular physical exercise & weight monitoring are important for maintaining a healthy lifestyle    You may be retaining fluid if you have a history of heart failure or if you experience any of the following symptoms:  Weight gain of 3 pounds or more overnight or 5 pounds in a week, increased swelling in our hands or feet or shortness of breath while lying flat in bed. Please call your doctor as soon as you notice any of these symptoms; do not wait until your next office visit.     Recognize signs and symptoms of STROKE:    F-face looks uneven    A-arms unable to move or move unevenly    S-speech slurred or non-existent    T-time-call 911 as soon as signs and symptoms begin-DO NOT go       Back to bed or wait to see if you get better-TIME IS BRAIN. Warning Signs of HEART ATTACK     Call 911 if you have these symptoms:   Chest discomfort. Most heart attacks involve discomfort in the center of the chest that lasts more than a few minutes, or that goes away and comes back. It can feel like uncomfortable pressure, squeezing, fullness, or pain.  Discomfort in other areas of the upper body. Symptoms can include pain or discomfort in one or both arms, the back, neck, jaw, or stomach.  Shortness of breath with or without chest discomfort.  Other signs may include breaking out in a cold sweat, nausea, or lightheadedness. Don't wait more than five minutes to call 911 - MINUTES MATTER! Fast action can save your life. Calling 911 is almost always the fastest way to get lifesaving treatment. Emergency Medical Services staff can begin treatment when they arrive -- up to an hour sooner than if someone gets to the hospital by car. The discharge information has been reviewed with the patient. The patient verbalized understanding. Discharge medications reviewed with the patient and appropriate educational materials and side effects teaching were provided.   ___________________________________________________________________________________________________________________________________

## 2019-04-25 ENCOUNTER — APPOINTMENT (OUTPATIENT)
Dept: GENERAL RADIOLOGY | Age: 29
End: 2019-04-25
Attending: EMERGENCY MEDICINE
Payer: SELF-PAY

## 2019-04-25 ENCOUNTER — HOSPITAL ENCOUNTER (EMERGENCY)
Age: 29
Discharge: HOME | End: 2019-04-25
Attending: EMERGENCY MEDICINE
Payer: SELF-PAY

## 2019-04-25 VITALS
HEIGHT: 59 IN | DIASTOLIC BLOOD PRESSURE: 89 MMHG | HEART RATE: 94 BPM | OXYGEN SATURATION: 100 % | RESPIRATION RATE: 16 BRPM | BODY MASS INDEX: 38.3 KG/M2 | TEMPERATURE: 98.3 F | WEIGHT: 190 LBS | SYSTOLIC BLOOD PRESSURE: 135 MMHG

## 2019-04-25 DIAGNOSIS — R60.0 LOWER EXTREMITY EDEMA: Primary | ICD-10-CM

## 2019-04-25 DIAGNOSIS — M79.10 MYALGIA: ICD-10-CM

## 2019-04-25 LAB
ALBUMIN SERPL-MCNC: 3.3 G/DL (ref 3.5–5)
ALBUMIN/GLOB SERPL: 0.8 {RATIO}
ALP SERPL-CCNC: 40 U/L (ref 50–136)
ALT SERPL-CCNC: 65 U/L (ref 12–65)
ANION GAP SERPL CALC-SCNC: 11 MMOL/L
APPEARANCE UR: CLEAR
AST SERPL-CCNC: 26 U/L (ref 15–37)
BACTERIA URNS QL MICRO: 0 /HPF
BASOPHILS # BLD: 0.1 K/UL (ref 0–0.2)
BASOPHILS NFR BLD: 0 % (ref 0–2)
BILIRUB SERPL-MCNC: 0.6 MG/DL (ref 0.2–1.1)
BILIRUB UR QL: NEGATIVE
BNP SERPL-MCNC: 319 PG/ML
BUN SERPL-MCNC: 31 MG/DL (ref 6–23)
CALCIUM SERPL-MCNC: 8.7 MG/DL (ref 8.3–10.4)
CASTS URNS QL MICRO: ABNORMAL /LPF
CHLORIDE SERPL-SCNC: 107 MMOL/L (ref 98–107)
CK SERPL-CCNC: 146 U/L (ref 21–215)
CO2 SERPL-SCNC: 23 MMOL/L (ref 21–32)
COLOR UR: YELLOW
CREAT SERPL-MCNC: 1.72 MG/DL (ref 0.6–1)
DIFFERENTIAL METHOD BLD: ABNORMAL
EOSINOPHIL # BLD: 0.6 K/UL (ref 0–0.8)
EOSINOPHIL NFR BLD: 3 % (ref 0.5–7.8)
EPI CELLS #/AREA URNS HPF: ABNORMAL /HPF
ERYTHROCYTE [DISTWIDTH] IN BLOOD BY AUTOMATED COUNT: 15.3 % (ref 11.9–14.6)
ETHANOL SERPL-MCNC: <3 MG/DL
GLOBULIN SER CALC-MCNC: 4.1 G/DL (ref 2.3–3.5)
GLUCOSE SERPL-MCNC: 74 MG/DL (ref 65–100)
GLUCOSE UR STRIP.AUTO-MCNC: NEGATIVE MG/DL
HCG UR QL: NEGATIVE
HCT VFR BLD AUTO: 34.1 % (ref 35.8–46.3)
HGB BLD-MCNC: 11.3 G/DL (ref 11.7–15.4)
HGB UR QL STRIP: ABNORMAL
IMM GRANULOCYTES # BLD AUTO: 0.4 K/UL (ref 0–0.5)
IMM GRANULOCYTES NFR BLD AUTO: 2 % (ref 0–5)
INR PPP: 1.5
KETONES UR QL STRIP.AUTO: NEGATIVE MG/DL
LEUKOCYTE ESTERASE UR QL STRIP.AUTO: ABNORMAL
LYMPHOCYTES # BLD: 2.7 K/UL (ref 0.5–4.6)
LYMPHOCYTES NFR BLD: 13 % (ref 13–44)
MCH RBC QN AUTO: 30.3 PG (ref 26.1–32.9)
MCHC RBC AUTO-ENTMCNC: 33.1 G/DL (ref 31.4–35)
MCV RBC AUTO: 91.4 FL (ref 79.6–97.8)
MONOCYTES # BLD: 1.3 K/UL (ref 0.1–1.3)
MONOCYTES NFR BLD: 7 % (ref 4–12)
NEUTS SEG # BLD: 14.9 K/UL (ref 1.7–8.2)
NEUTS SEG NFR BLD: 75 % (ref 43–78)
NITRITE UR QL STRIP.AUTO: NEGATIVE
NRBC # BLD: 0 K/UL (ref 0–0.2)
PH UR STRIP: 7.5 [PH] (ref 5–9)
PLATELET # BLD AUTO: 526 K/UL (ref 150–450)
PMV BLD AUTO: 9.2 FL (ref 9.4–12.3)
POTASSIUM SERPL-SCNC: 4.1 MMOL/L (ref 3.5–5.1)
PROT SERPL-MCNC: 7.4 G/DL
PROT UR STRIP-MCNC: NEGATIVE MG/DL
PROTHROMBIN TIME: 18 SEC (ref 11.7–14.5)
RBC # BLD AUTO: 3.73 M/UL (ref 4.05–5.2)
RBC #/AREA URNS HPF: ABNORMAL /HPF
SODIUM SERPL-SCNC: 141 MMOL/L (ref 136–145)
SP GR UR REFRACTOMETRY: 1.01 (ref 1–1.02)
TSH SERPL DL<=0.005 MIU/L-ACNC: 3.51 UIU/ML
UROBILINOGEN UR QL STRIP.AUTO: 0.2 EU/DL (ref 0.2–1)
WBC # BLD AUTO: 19.9 K/UL (ref 4.3–11.1)
WBC URNS QL MICRO: ABNORMAL /HPF

## 2019-04-25 PROCEDURE — 74011636637 HC RX REV CODE- 636/637: Performed by: EMERGENCY MEDICINE

## 2019-04-25 PROCEDURE — 81025 URINE PREGNANCY TEST: CPT

## 2019-04-25 PROCEDURE — 80053 COMPREHEN METABOLIC PANEL: CPT

## 2019-04-25 PROCEDURE — 82550 ASSAY OF CK (CPK): CPT

## 2019-04-25 PROCEDURE — 85610 PROTHROMBIN TIME: CPT

## 2019-04-25 PROCEDURE — 74011250637 HC RX REV CODE- 250/637: Performed by: EMERGENCY MEDICINE

## 2019-04-25 PROCEDURE — 80307 DRUG TEST PRSMV CHEM ANLYZR: CPT

## 2019-04-25 PROCEDURE — 99284 EMERGENCY DEPT VISIT MOD MDM: CPT | Performed by: EMERGENCY MEDICINE

## 2019-04-25 PROCEDURE — 83880 ASSAY OF NATRIURETIC PEPTIDE: CPT

## 2019-04-25 PROCEDURE — 84443 ASSAY THYROID STIM HORMONE: CPT

## 2019-04-25 PROCEDURE — 81001 URINALYSIS AUTO W/SCOPE: CPT

## 2019-04-25 PROCEDURE — 71046 X-RAY EXAM CHEST 2 VIEWS: CPT

## 2019-04-25 PROCEDURE — 85025 COMPLETE CBC W/AUTO DIFF WBC: CPT

## 2019-04-25 RX ORDER — FUROSEMIDE 40 MG/1
20 TABLET ORAL
Status: COMPLETED | OUTPATIENT
Start: 2019-04-25 | End: 2019-04-25

## 2019-04-25 RX ORDER — PREDNISONE 20 MG/1
20 TABLET ORAL
Status: COMPLETED | OUTPATIENT
Start: 2019-04-25 | End: 2019-04-25

## 2019-04-25 RX ORDER — ACETAMINOPHEN 500 MG
1000 TABLET ORAL
Status: COMPLETED | OUTPATIENT
Start: 2019-04-25 | End: 2019-04-25

## 2019-04-25 RX ADMIN — PREDNISONE 20 MG: 20 TABLET ORAL at 17:47

## 2019-04-25 RX ADMIN — ACETAMINOPHEN 1000 MG: 500 TABLET, FILM COATED ORAL at 17:47

## 2019-04-25 RX ADMIN — FUROSEMIDE 20 MG: 40 TABLET ORAL at 17:47

## 2019-04-25 NOTE — ED TRIAGE NOTES
Pt c/o generalized body swelling and  Pain. She states that she has a \"blood clot\" in one of her lower legs. Pt states that she was admitted at [facility] with sepsis but signed out AMA on 4/6/19. She adds that she was drinking alcohol and using meth while in the hospital and was not \"thinking clearly\" when she signed out. Pt was given multiple prescriptions but has not had any of them filled.

## 2019-04-25 NOTE — ED NOTES
I have reviewed discharge instructions with the patient. The patient verbalized understanding. Patient left ED via Discharge Method: ambulatory to Home with family Opportunity for questions and clarification provided. Patient given 0 scripts. To continue your aftercare when you leave the hospital, you may receive an automated call from our care team to check in on how you are doing. This is a free service and part of our promise to provide the best care and service to meet your aftercare needs.  If you have questions, or wish to unsubscribe from this service please call [phone]. Thank you for Choosing our [facility].

## 2019-04-25 NOTE — ED PROVIDER NOTES
26-year-old female with history of alcohol and methamphetamine abuse presents with bilateral leg swelling. She was admitted to the hospital April 4 until yesterday for acute renal failure requiring dialysis, rhabdomyolysis, UTI with sepsis, left leg DVT, anemia requiring blood transfusions and myositis. She left AMA yesterday. She admits to using methamphetamine and alcohol the entire hospital stay. She presents today for increased bilateral leg swelling, worse on left. She is a known DVT in her left leg and was not discharged on Coumadin due to it being a high-risk medication with drug abuse. She reports shortness of breath with ambulation. She did have pleural effusions with prior admission. She denies any current chest pain. She thinks she may have had a fever last night. She has had normal urination. She states she was taken off dialysis on Monday of this week stating that her kidney function had improved. Other Associated symptoms include shortness of breath. Pertinent negatives include no chest pain, no abdominal pain and no headaches. Nothing aggravates the symptoms. Nothing relieves the symptoms. Past Medical History:  
Diagnosis Date  Asthma  Chronic back pain  Chronic lower back pain  Psychiatric disorder   
 bipolar  Seizures ([facility] [address] Past Surgical History:  
Procedure Laterality Date  HX GYN    
 2 c-sec, tubal  
 IR INSERT NON TUNL CVC OVER 5 YRS  4/8/2019  IR INSERT NON TUNL CVC OVER 5 YRS  4/10/2019  IR REMOVE TUNL CVAD W/O PORT / PUMP  4/24/2019 No family history on file. Social History Socioeconomic History  Marital status: SINGLE Spouse name: Not on file  Number of children: Not on file  Years of education: Not on file  Highest education level: Not on file Occupational History  Not on file Social Needs  Financial resource strain: Not on file  Food insecurity:  
  Worry: Not on file Inability: Not on file  Transportation needs:  
  Medical: Not on file Non-medical: Not on file Tobacco Use  Smoking status: Current Every Day Smoker Packs/day: 1.00 Years: 1.00 Pack years: 1.00  Smokeless tobacco: Never Used Substance and Sexual Activity  Alcohol use: Yes Comment: occassionally  Drug use: No  
 Sexual activity: Not on file Lifestyle  Physical activity:  
  Days per week: Not on file Minutes per session: Not on file  Stress: Not on file Relationships  Social connections:  
  Talks on phone: Not on file Gets together: Not on file Attends Congregational service: Not on file Active member of club or organization: Not on file Attends meetings of clubs or organizations: Not on file Relationship status: Not on file  Intimate partner violence:  
  Fear of current or ex partner: Not on file Emotionally abused: Not on file Physically abused: Not on file Forced sexual activity: Not on file Other Topics Concern  Not on file Social History Narrative  Not on file ALLERGIES: Dilantin [phenytoin sodium extended] and Keppra [levetiracetam] Review of Systems Constitutional: Positive for fatigue and fever. Negative for chills. HENT: Negative for hearing loss. Eyes: Negative for visual disturbance. Respiratory: Positive for shortness of breath. Negative for cough. Cardiovascular: Positive for leg swelling. Negative for chest pain and palpitations. Gastrointestinal: Negative for abdominal pain, diarrhea, nausea and vomiting. Genitourinary: Negative for difficulty urinating. Musculoskeletal: Positive for arthralgias and myalgias. Negative for back pain. Skin: Negative for rash. Neurological: Negative for weakness and headaches. Psychiatric/Behavioral: Negative for confusion. Vitals:  
 04/25/19 1247 BP: (!) 143/96 Pulse: (!) 108 Resp: 16 Temp: 98.3 °F (36.8 °C) SpO2: 100% Weight: 86.2 kg (190 lb) Height: 4' 11\" (1.499 m) Physical Exam  
Constitutional: She appears well-developed and well-nourished. HENT:  
Head: Normocephalic and atraumatic. Eyes: Pupils are equal, round, and reactive to light. EOM are normal.  
Neck: Normal range of motion. Neck supple. Cardiovascular: Regular rhythm and normal heart sounds. Tachycardia present. Pulmonary/Chest: Effort normal and breath sounds normal.  
Abdominal: Soft. There is no tenderness. Musculoskeletal: Normal range of motion. She exhibits edema. 3+ pitting edema bilateral legs and feet Neurological: She is alert. Skin: Skin is warm and dry. Psychiatric: Her behavior is normal.  
Nursing note and vitals reviewed. MDM Number of Diagnoses or Management Options Diagnosis management comments: Parts of this document were created using dragon voice recognition software. The chart has been reviewed but errors may still be present. Labs and cxr ordered. Will dw oncoming physician at shift change. Amount and/or Complexity of Data Reviewed Clinical lab tests: ordered Tests in the radiology section of CPT®: ordered Procedures Results Include: 
 
Recent Results (from the past 24 hour(s)) BNP Collection Time: 04/25/19  2:46 PM  
Result Value Ref Range  pg/mL CBC WITH AUTOMATED DIFF Collection Time: 04/25/19  2:46 PM  
Result Value Ref Range WBC 19.9 (H) 4.3 - 11.1 K/uL  
 RBC 3.73 (L) 4.05 - 5.2 M/uL  
 HGB 11.3 (L) 11.7 - 15.4 g/dL HCT 34.1 (L) 35.8 - 46.3 % MCV 91.4 79.6 - 97.8 FL  
 MCH 30.3 26.1 - 32.9 PG  
 MCHC 33.1 31.4 - 35.0 g/dL  
 RDW 15.3 (H) 11.9 - 14.6 % PLATELET 062 (H) 361 - 450 K/uL MPV 9.2 (L) 9.4 - 12.3 FL ABSOLUTE NRBC 0.00 0.0 - 0.2 K/uL  
 DF AUTOMATED NEUTROPHILS 75 43 - 78 % LYMPHOCYTES 13 13 - 44 % MONOCYTES 7 4.0 - 12.0 % EOSINOPHILS 3 0.5 - 7.8 %  BASOPHILS 0 0.0 - 2.0 %  
 IMMATURE GRANULOCYTES 2 0.0 - 5.0 %  
 ABS. NEUTROPHILS 14.9 (H) 1.7 - 8.2 K/UL  
 ABS. LYMPHOCYTES 2.7 0.5 - 4.6 K/UL  
 ABS. MONOCYTES 1.3 0.1 - 1.3 K/UL  
 ABS. EOSINOPHILS 0.6 0.0 - 0.8 K/UL  
 ABS. BASOPHILS 0.1 0.0 - 0.2 K/UL  
 ABS. IMM. GRANS. 0.4 0.0 - 0.5 K/UL METABOLIC PANEL, COMPREHENSIVE Collection Time: 04/25/19  2:46 PM  
Result Value Ref Range Sodium 141 136 - 145 mmol/L Potassium 4.1 3.5 - 5.1 mmol/L Chloride 107 98 - 107 mmol/L  
 CO2 23 21 - 32 mmol/L Anion gap 11 mmol/L Glucose 74 65 - 100 mg/dL BUN 31 (H) 6 - 23 MG/DL Creatinine 1.72 (H) 0.6 - 1.0 MG/DL  
 GFR est AA 45 (L) >60 ml/min/1.73m2 GFR est non-AA 37 ml/min/1.73m2 Calcium 8.7 8.3 - 10.4 MG/DL Bilirubin, total 0.6 0.2 - 1.1 MG/DL  
 ALT (SGPT) 65 12 - 65 U/L  
 AST (SGOT) 26 15 - 37 U/L Alk. phosphatase 40 (L) 50 - 136 U/L Protein, total 7.4 g/dL Albumin 3.3 (L) 3.5 - 5.0 g/dL Globulin 4.1 (H) 2.3 - 3.5 g/dL A-G Ratio 0.8 TSH 3RD GENERATION Collection Time: 04/25/19  2:46 PM  
Result Value Ref Range TSH 3.510 uIU/mL URINALYSIS W/ RFLX MICROSCOPIC Collection Time: 04/25/19  2:46 PM  
Result Value Ref Range Color YELLOW Appearance CLEAR Specific gravity 1.008 1.001 - 1.023    
 pH (UA) 7.5 5.0 - 9.0 Protein NEGATIVE  NEG mg/dL Glucose NEGATIVE  mg/dL Ketone NEGATIVE  NEG mg/dL Bilirubin NEGATIVE  NEG Blood SMALL (A) NEG Urobilinogen 0.2 0.2 - 1.0 EU/dL Nitrites NEGATIVE  NEG Leukocyte Esterase SMALL (A) NEG    
 WBC 20-50 0 /hpf  
 RBC 5-10 0 /hpf Epithelial cells 0-3 0 /hpf Bacteria 0 0 /hpf Casts 0-3 0 /lpf PROTHROMBIN TIME + INR Collection Time: 04/25/19  2:46 PM  
Result Value Ref Range Prothrombin time 18.0 (H) 11.7 - 14.5 sec INR 1.5 CK Collection Time: 04/25/19  2:46 PM  
Result Value Ref Range  21 - 215 U/L  
HCG URINE, QL. - POC  Collection Time: 04/25/19  2:52 PM  
 Result Value Ref Range Pregnancy test,urine (POC) NEGATIVE  NEG Xr Chest Pa Lat Result Date: 4/25/2019 CHEST X-RAY, 2 views 4/25/2019 History: Moderate chest pain and shortness of breath. Fluid retention. Recent kidney failure. Technique: PA and lateral views of the chest. Comparison: Chest x-ray 4/22/2019 Findings: The cardiac silhouette is normal in respect to size. The lungs are expanded without evidence for pneumothorax. No consolidation, or evidence of pleural effusion is seen. The bony thorax demonstrates no acute changes. The upper abdomen is unremarkable in appearance. IMPRESSION: 1. No acute cardiopulmonary process evident by plain film imaging. Suspect edema due to a consequence of previous salt hydration as a treatment for sepsis and renal failure and rhabdomyolysis. Patient is not started prednisone nor Lasix at this point. Have asked favor to come speak with the patient. 5:49 PM 
Zonia Richards has seen the patient and will be setting up some outpatient follow-up. No evidence require readmission.  discussed with patient regarding filling medications and apparently the patient will be able to do so.

## 2019-04-25 NOTE — DISCHARGE INSTRUCTIONS
Physical prescriptions your previously given. He will take down the swelling and soreness in the muscles to help improve your pain. May try some Tylenol as well. Keep appointments and contacts that are given to you by the [facility] -  DEBORAH becker. Patient Education        Leg and Ankle Edema: Care Instructions  Your Care Instructions  Swelling in the legs, ankles, and feet is called edema. It is common after you sit or stand for a while. Long plane flights or car rides often cause swelling in the legs and feet. You may also have swelling if you have to stand for long periods of time at your job. Problems with the veins in the legs (varicose veins) and changes in hormones can also cause swelling. Sometimes the swelling in the ankles and feet is caused by a more serious problem, such as heart failure, infection, blood clots, or liver or kidney disease. Follow-up care is a key part of your treatment and safety. Be sure to make and go to all appointments, and call your doctor if you are having problems. It's also a good idea to know your test results and keep a list of the medicines you take. How can you care for yourself at home? · If your doctor gave you medicine, take it as prescribed. Call your doctor if you think you are having a problem with your medicine. · Whenever you are resting, raise your legs up. Try to keep the swollen area higher than the level of your heart. · Take breaks from standing or sitting in one position. ? Walk around to increase the blood flow in your lower legs. ? Move your feet and ankles often while you stand, or tighten and relax your leg muscles. · Wear support stockings. Put them on in the morning, before swelling gets worse. · Eat a balanced diet. Lose weight if you need to. · Limit the amount of salt (sodium) in your diet. Salt holds fluid in the body and may increase swelling. When should you call for help? Call 911 anytime you think you may need emergency care.  For example, call if:    · You have symptoms of a blood clot in your lung (called a pulmonary embolism). These may include:  ? Sudden chest pain. ? Trouble breathing. ? Coughing up blood.    Call your doctor now or seek immediate medical care if:    · You have signs of a blood clot, such as:  ? Pain in your calf, back of the knee, thigh, or groin. ? Redness and swelling in your leg or groin.     · You have symptoms of infection, such as:  ? Increased pain, swelling, warmth, or redness. ? Red streaks or pus. ? A fever.    Watch closely for changes in your health, and be sure to contact your doctor if:    · Your swelling is getting worse.     · You have new or worsening pain in your legs.     · You do not get better as expected. Where can you learn more? Go to http://farheen-tatyana.info/. Enter Q781 in the search box to learn more about \"Leg and Ankle Edema: Care Instructions. \"  Current as of: September 23, 2018  Content Version: 11.9  © 4053-1783 [employer]. Care instructions adapted under license by [employer] (which disclaims liability or warranty for this information). If you have questions about a medical condition or this instruction, always ask your healthcare professional. [address] any warranty or liability for your use of this information.

## 2019-04-27 NOTE — ED NOTES
Chart accessed to look and see if scripts were e-scribed and they were. Heber Lebron RN, charge nurse states patients cousin called and they had misplaced paperwork. The prescriptions were e-scribed to [facility] Longo on [facility].

## 2019-07-03 ENCOUNTER — APPOINTMENT (OUTPATIENT)
Dept: GENERAL RADIOLOGY | Age: 29
End: 2019-07-03
Payer: SELF-PAY

## 2019-07-03 ENCOUNTER — HOSPITAL ENCOUNTER (EMERGENCY)
Age: 29
Discharge: HOME | End: 2019-07-03
Payer: SELF-PAY

## 2019-07-03 VITALS
HEART RATE: 124 BPM | SYSTOLIC BLOOD PRESSURE: 135 MMHG | OXYGEN SATURATION: 90 % | BODY MASS INDEX: 27.21 KG/M2 | RESPIRATION RATE: 25 BRPM | DIASTOLIC BLOOD PRESSURE: 78 MMHG | TEMPERATURE: 99.5 F | HEIGHT: 59 IN | WEIGHT: 135 LBS

## 2019-07-03 DIAGNOSIS — N39.0 URINARY TRACT INFECTION WITHOUT HEMATURIA, SITE UNSPECIFIED: ICD-10-CM

## 2019-07-03 DIAGNOSIS — F19.10 POLYSUBSTANCE ABUSE (HCC): Primary | ICD-10-CM

## 2019-07-03 LAB
ALBUMIN SERPL-MCNC: 3.8 G/DL (ref 3.5–5)
ALBUMIN/GLOB SERPL: 1 {RATIO} (ref 1.2–3.5)
ALP SERPL-CCNC: 46 U/L (ref 50–136)
ALT SERPL-CCNC: 24 U/L (ref 12–65)
AMPHET UR QL SCN: POSITIVE
ANION GAP SERPL CALC-SCNC: 14 MMOL/L (ref 7–16)
APPEARANCE UR: ABNORMAL
AST SERPL-CCNC: 20 U/L (ref 15–37)
ATRIAL RATE: 124 BPM
BACTERIA URNS QL MICRO: ABNORMAL /HPF
BARBITURATES UR QL SCN: NEGATIVE
BASOPHILS # BLD: 0 K/UL (ref 0–0.2)
BASOPHILS NFR BLD: 0 % (ref 0–2)
BENZODIAZ UR QL: POSITIVE
BILIRUB SERPL-MCNC: 0.2 MG/DL (ref 0.2–1.1)
BILIRUB UR QL: NEGATIVE
BUN SERPL-MCNC: 9 MG/DL (ref 6–23)
CALCIUM SERPL-MCNC: 8.7 MG/DL (ref 8.3–10.4)
CALCULATED P AXIS, ECG09: 70 DEGREES
CALCULATED R AXIS, ECG10: 92 DEGREES
CALCULATED T AXIS, ECG11: 60 DEGREES
CANNABINOIDS UR QL SCN: POSITIVE
CASTS URNS QL MICRO: 0 /LPF
CHLORIDE SERPL-SCNC: 101 MMOL/L (ref 98–107)
CK SERPL-CCNC: 63 U/L (ref 21–215)
CO2 SERPL-SCNC: 19 MMOL/L (ref 21–32)
COCAINE UR QL SCN: POSITIVE
COLOR UR: YELLOW
CREAT SERPL-MCNC: 0.75 MG/DL (ref 0.6–1)
CRYSTALS URNS QL MICRO: 0 /LPF
DIAGNOSIS, 93000: NORMAL
DIFFERENTIAL METHOD BLD: ABNORMAL
EOSINOPHIL # BLD: 0 K/UL (ref 0–0.8)
EOSINOPHIL NFR BLD: 0 % (ref 0.5–7.8)
EPI CELLS #/AREA URNS HPF: ABNORMAL /HPF
ERYTHROCYTE [DISTWIDTH] IN BLOOD BY AUTOMATED COUNT: 14.1 % (ref 11.9–14.6)
ETHANOL SERPL-MCNC: 54 MG/DL
GLOBULIN SER CALC-MCNC: 3.8 G/DL (ref 2.3–3.5)
GLUCOSE SERPL-MCNC: 80 MG/DL (ref 65–100)
GLUCOSE UR STRIP.AUTO-MCNC: NEGATIVE MG/DL
HCG UR QL: NEGATIVE
HCT VFR BLD AUTO: 41.9 % (ref 35.8–46.3)
HGB BLD-MCNC: 14.2 G/DL (ref 11.7–15.4)
HGB UR QL STRIP: NEGATIVE
IMM GRANULOCYTES # BLD AUTO: 0 K/UL (ref 0–0.5)
IMM GRANULOCYTES NFR BLD AUTO: 0 % (ref 0–5)
KETONES UR QL STRIP.AUTO: NEGATIVE MG/DL
LACTATE BLD-SCNC: 0.83 MMOL/L (ref 0.5–1.9)
LACTATE BLD-SCNC: 3.78 MMOL/L (ref 0.5–1.9)
LEUKOCYTE ESTERASE UR QL STRIP.AUTO: ABNORMAL
LYMPHOCYTES # BLD: 1.4 K/UL (ref 0.5–4.6)
LYMPHOCYTES NFR BLD: 12 % (ref 13–44)
MCH RBC QN AUTO: 31.6 PG (ref 26.1–32.9)
MCHC RBC AUTO-ENTMCNC: 33.9 G/DL (ref 31.4–35)
MCV RBC AUTO: 93.1 FL (ref 79.6–97.8)
METHADONE UR QL: NEGATIVE
MONOCYTES # BLD: 0.5 K/UL (ref 0.1–1.3)
MONOCYTES NFR BLD: 4 % (ref 4–12)
MUCOUS THREADS URNS QL MICRO: 0 /LPF
NEUTS SEG # BLD: 9.3 K/UL (ref 1.7–8.2)
NEUTS SEG NFR BLD: 83 % (ref 43–78)
NITRITE UR QL STRIP.AUTO: POSITIVE
NRBC # BLD: 0 K/UL (ref 0–0.2)
OPIATES UR QL: NEGATIVE
P-R INTERVAL, ECG05: 146 MS
PCP UR QL: NEGATIVE
PH UR STRIP: 6 [PH] (ref 5–9)
PLATELET # BLD AUTO: 263 K/UL (ref 150–450)
PMV BLD AUTO: 9.9 FL (ref 9.4–12.3)
POTASSIUM SERPL-SCNC: 3.9 MMOL/L (ref 3.5–5.1)
PROCALCITONIN SERPL-MCNC: <0.1 NG/ML
PROT SERPL-MCNC: 7.6 G/DL (ref 6.3–8.2)
PROT UR STRIP-MCNC: NEGATIVE MG/DL
Q-T INTERVAL, ECG07: 284 MS
QRS DURATION, ECG06: 66 MS
QTC CALCULATION (BEZET), ECG08: 408 MS
RBC # BLD AUTO: 4.5 M/UL (ref 4.05–5.2)
RBC #/AREA URNS HPF: 0 /HPF
SODIUM SERPL-SCNC: 134 MMOL/L (ref 136–145)
SP GR UR REFRACTOMETRY: 1.02 (ref 1–1.02)
UROBILINOGEN UR QL STRIP.AUTO: 0.2 EU/DL (ref 0.2–1)
VENTRICULAR RATE, ECG03: 124 BPM
WBC # BLD AUTO: 11.2 K/UL (ref 4.3–11.1)
WBC URNS QL MICRO: ABNORMAL /HPF

## 2019-07-03 PROCEDURE — 96367 TX/PROPH/DG ADDL SEQ IV INF: CPT

## 2019-07-03 PROCEDURE — 96372 THER/PROPH/DIAG INJ SC/IM: CPT

## 2019-07-03 PROCEDURE — 82550 ASSAY OF CK (CPK): CPT

## 2019-07-03 PROCEDURE — 74011000250 HC RX REV CODE- 250

## 2019-07-03 PROCEDURE — 94640 AIRWAY INHALATION TREATMENT: CPT

## 2019-07-03 PROCEDURE — 83605 ASSAY OF LACTIC ACID: CPT

## 2019-07-03 PROCEDURE — 84145 PROCALCITONIN (PCT): CPT

## 2019-07-03 PROCEDURE — 71045 X-RAY EXAM CHEST 1 VIEW: CPT

## 2019-07-03 PROCEDURE — 93005 ELECTROCARDIOGRAM TRACING: CPT

## 2019-07-03 PROCEDURE — 81025 URINE PREGNANCY TEST: CPT

## 2019-07-03 PROCEDURE — 96366 THER/PROPH/DIAG IV INF ADDON: CPT

## 2019-07-03 PROCEDURE — 80053 COMPREHEN METABOLIC PANEL: CPT

## 2019-07-03 PROCEDURE — 96375 TX/PRO/DX INJ NEW DRUG ADDON: CPT

## 2019-07-03 PROCEDURE — 74011000258 HC RX REV CODE- 258

## 2019-07-03 PROCEDURE — 96365 THER/PROPH/DIAG IV INF INIT: CPT

## 2019-07-03 PROCEDURE — 85025 COMPLETE CBC W/AUTO DIFF WBC: CPT

## 2019-07-03 PROCEDURE — 80307 DRUG TEST PRSMV CHEM ANLYZR: CPT

## 2019-07-03 PROCEDURE — 74011250636 HC RX REV CODE- 250/636

## 2019-07-03 PROCEDURE — 36415 COLL VENOUS BLD VENIPUNCTURE: CPT

## 2019-07-03 PROCEDURE — 99285 EMERGENCY DEPT VISIT HI MDM: CPT

## 2019-07-03 PROCEDURE — 87040 BLOOD CULTURE FOR BACTERIA: CPT

## 2019-07-03 PROCEDURE — 81015 MICROSCOPIC EXAM OF URINE: CPT

## 2019-07-03 PROCEDURE — 81003 URINALYSIS AUTO W/O SCOPE: CPT

## 2019-07-03 RX ORDER — MELOXICAM 15 MG/1
15 TABLET ORAL DAILY
Qty: 10 TAB | Refills: 0 | Status: SHIPPED | OUTPATIENT
Start: 2019-07-03

## 2019-07-03 RX ORDER — HALOPERIDOL 5 MG/ML
5 INJECTION INTRAMUSCULAR
Status: DISCONTINUED | OUTPATIENT
Start: 2019-07-03 | End: 2019-07-03

## 2019-07-03 RX ORDER — VANCOMYCIN HYDROCHLORIDE
1250 ONCE
Status: COMPLETED | OUTPATIENT
Start: 2019-07-03 | End: 2019-07-03

## 2019-07-03 RX ORDER — DIPHENHYDRAMINE HYDROCHLORIDE 50 MG/ML
25 INJECTION, SOLUTION INTRAMUSCULAR; INTRAVENOUS
Status: COMPLETED | OUTPATIENT
Start: 2019-07-03 | End: 2019-07-03

## 2019-07-03 RX ORDER — SODIUM CHLORIDE 0.9 % (FLUSH) 0.9 %
5-10 SYRINGE (ML) INJECTION AS NEEDED
Status: DISCONTINUED | OUTPATIENT
Start: 2019-07-03 | End: 2019-07-03 | Stop reason: HOSPADM

## 2019-07-03 RX ORDER — HALOPERIDOL 5 MG/ML
10 INJECTION INTRAMUSCULAR
Status: COMPLETED | OUTPATIENT
Start: 2019-07-03 | End: 2019-07-03

## 2019-07-03 RX ORDER — IPRATROPIUM BROMIDE AND ALBUTEROL SULFATE 2.5; .5 MG/3ML; MG/3ML
3 SOLUTION RESPIRATORY (INHALATION)
Status: COMPLETED | OUTPATIENT
Start: 2019-07-03 | End: 2019-07-03

## 2019-07-03 RX ORDER — AMOXICILLIN AND CLAVULANATE POTASSIUM 875; 125 MG/1; MG/1
1 TABLET, FILM COATED ORAL
Status: DISCONTINUED | OUTPATIENT
Start: 2019-07-03 | End: 2019-07-03 | Stop reason: HOSPADM

## 2019-07-03 RX ADMIN — SODIUM CHLORIDE 836 ML: 900 INJECTION, SOLUTION INTRAVENOUS at 05:55

## 2019-07-03 RX ADMIN — VANCOMYCIN HYDROCHLORIDE 1250 MG: 10 INJECTION, POWDER, LYOPHILIZED, FOR SOLUTION INTRAVENOUS at 05:50

## 2019-07-03 RX ADMIN — DIPHENHYDRAMINE HYDROCHLORIDE 25 MG: 50 INJECTION, SOLUTION INTRAMUSCULAR; INTRAVENOUS at 07:19

## 2019-07-03 RX ADMIN — SODIUM CHLORIDE 1000 ML: 900 INJECTION, SOLUTION INTRAVENOUS at 04:54

## 2019-07-03 RX ADMIN — HALOPERIDOL LACTATE 10 MG: 5 INJECTION INTRAMUSCULAR at 07:20

## 2019-07-03 RX ADMIN — PIPERACILLIN, TAZOBACTAM 4.5 G: 4; .5 INJECTION, POWDER, LYOPHILIZED, FOR SOLUTION INTRAVENOUS at 04:54

## 2019-07-03 RX ADMIN — IPRATROPIUM BROMIDE AND ALBUTEROL SULFATE 3 ML: .5; 3 SOLUTION RESPIRATORY (INHALATION) at 05:17

## 2019-07-03 NOTE — ED TRIAGE NOTES
C/o lower back pain, chills, urinary pain and dark colored urine. Onset of symptoms approx 2 weeks ago with progressive worsening. Visitor of whom arrives with pt states ARF approx 3 months pta, on dialysis for 3 weeks however signed out AMA, has not had dialysis since. States told \"my kidneys are functioning at 22%\". +etoh, meth. Reports use of meth and etoh daily. Admits to abusing marijuana,xanax and seroquel.

## 2019-07-03 NOTE — DISCHARGE INSTRUCTIONS
Patient Education        Alcohol, Drug, or Poison Ingestion: Care Instructions  Your Care Instructions    A person can become very sick, or die, from swallowing or using alcohol, drugs, or poisons. Alcohol poisoning occurs when a person drinks a large amount of alcohol. Alcohol can stop nerve signals that control breathing. It can also stop the gag reflex that prevents choking. Alcohol poisoning is serious. It can lead to brain damage or death if it's not treated right away. Drugs can be used by accident or on purpose. They can be swallowed, inhaled, injected, or absorbed through the skin. Drugs include over-the-counter medicine (such as aspirin or acetaminophen) and prescription medicine. They also include vitamins and supplements. And they include illegal drugs such as cocaine and heroin. And poisons are all around us. They include household , cosmetics, houseplants, and garden chemicals. The doctor has checked you carefully, but problems can develop later. If you notice any problems or new symptoms, get medical treatment right away. Follow-up care is a key part of your treatment and safety. Be sure to make and go to all appointments, and call your doctor if you are having problems. It's also a good idea to know your test results and keep a list of the medicines you take. How can you care for yourself at home? Alcohol problems  · Talk to your doctor or counselor about programs that can help you stop using alcohol. · Plan ways to avoid being tempted to drink. ? Get rid of all alcohol in your home. ? Avoid places where you tend to drink. ? Stay away from places or events that offer alcohol. ? Stay away from people who drink a lot. Drug problems  · Talk to your doctor about programs that can help you stop using drugs. · Get rid of any drugs you might be tempted to misuse. · Learn how to say no when other people use drugs. · Don't spend time with people who use drugs.   Poison prevention  · Keep products in the containers they came in. Keep them with the original labels. · Be careful when you use cleaning products, paints, solvents, and pesticides. Read labels before use. Use a fan to move strong odors and fumes out of your home. · Do not mix cleaning products. Try to use nontoxic . These include vinegar, lemon juice, and baking soda. When should you call for help? Poison control centers, hospitals, or your doctor can give immediate advice in the case of a poisoning. The [facility] [employer] number is [phone]. Have the poison container with you so you can give complete information to the poison control center, such as what the poison or substance is, how much was taken and when. Do not try to make the person vomit.   Call 911 anytime you think you may need emergency care. For example, call if you or someone else:    · Has used or currently uses alcohol or drugs and is very confused or can't stay awake.     · Has passed out (lost consciousness).     · Has severe trouble breathing.     · Is having a seizure.    Call your doctor now or seek immediate medical care if you or someone else:    · Has new symptoms, or is not acting normally.    Watch closely for changes in your health, and be sure to contact your doctor if:    · You do not get better as expected.     · You need help with drug or alcohol problems.     · You have problems with depression or other mental health issues. Where can you learn more? Go to http://farheen-tatyana.info/. Enter P145 in the search box to learn more about \"Alcohol, Drug, or Poison Ingestion: Care Instructions. \"  Current as of: September 23, 2018  Content Version: 11.9  © 2175-2146 [employer]. Care instructions adapted under license by [employer] (which disclaims liability or warranty for this information).  If you have questions about a medical condition or this instruction, always ask your healthcare professional. [place] [address] any warranty or liability for your use of this information.

## 2019-07-03 NOTE — ED NOTES
I have reviewed discharge instructions with the patient. The patient verbalized understanding. Patient left ED via Discharge Method: ambulatory to Home with yellow cab  Opportunity for questions and clarification provided. Patient given 1 scripts. No e-sign        To continue your aftercare when you leave the hospital, you may receive an automated call from our care team to check in on how you are doing. This is a free service and part of our promise to provide the best care and service to meet your aftercare needs.  If you have questions, or wish to unsubscribe from this service please call [phone]. Thank you for Choosing our [employer] Emergency Department.

## 2019-07-03 NOTE — ED PROVIDER NOTES
78-year-old female complaining of generalized body pain and back pain. Patient's had this before has a history of chronic pain chronic back pain and polysubstance abuse. Patient admits to multiple drugs today including methamphetamines. The history is provided by the patient. Back Pain    This is a chronic problem. The current episode started more than 2 days ago. The problem has not changed since onset. The problem occurs constantly. The pain is associated with no known injury. The pain is present in the thoracic spine, lumbar spine, sacro-iliac joint, upper back, middle back, lower back and generalized. The quality of the pain is described as stabbing and aching. The pain does not radiate. The pain is at a severity of 10/10. The pain is moderate. Past Medical History:   Diagnosis Date    Asthma     Chronic back pain     Chronic lower back pain     Psychiatric disorder     bipolar    Seizures (HCC)        Past Surgical History:   Procedure Laterality Date    HX GYN      2 c-sec, tubal    IR INSERT NON TUNL CVC OVER 5 YRS  4/8/2019    IR INSERT NON TUNL CVC OVER 5 YRS  4/10/2019    IR REMOVE TUNL CVAD W/O PORT / PUMP  4/24/2019         No family history on file.     Social History     Socioeconomic History    Marital status: SINGLE     Spouse name: Not on file    Number of children: Not on file    Years of education: Not on file    Highest education level: Not on file   Occupational History    Not on file   Social Needs    Financial resource strain: Not on file    Food insecurity:     Worry: Not on file     Inability: Not on file    Transportation needs:     Medical: Not on file     Non-medical: Not on file   Tobacco Use    Smoking status: Current Every Day Smoker     Packs/day: 1.00     Years: 1.00     Pack years: 1.00    Smokeless tobacco: Never Used   Substance and Sexual Activity    Alcohol use: Yes     Comment: occassionally    Drug use: No    Sexual activity: Not on file Lifestyle    Physical activity:     Days per week: Not on file     Minutes per session: Not on file    Stress: Not on file   Relationships    Social connections:     Talks on phone: Not on file     Gets together: Not on file     Attends Faith service: Not on file     Active member of club or organization: Not on file     Attends meetings of clubs or organizations: Not on file     Relationship status: Not on file    Intimate partner violence:     Fear of current or ex partner: Not on file     Emotionally abused: Not on file     Physically abused: Not on file     Forced sexual activity: Not on file   Other Topics Concern    Not on file   Social History Narrative    Not on file         ALLERGIES: Dilantin [phenytoin sodium extended] and Keppra [levetiracetam]    Review of Systems   Constitutional: Negative. Negative for activity change. HENT: Negative. Eyes: Negative. Respiratory: Negative. Cardiovascular: Negative. Gastrointestinal: Negative. Genitourinary: Negative. Musculoskeletal: Positive for back pain. Skin: Negative. Neurological: Negative. Psychiatric/Behavioral: Negative. All other systems reviewed and are negative. Vitals:    07/03/19 0817 07/03/19 0830 07/03/19 0900 07/03/19 0915   BP:  133/90 135/78    Pulse: (!) 122 (!) 122  (!) 124   Resp:  28  25   Temp:       SpO2: 100% 97% 98% 90%   Weight:       Height:                Physical Exam   Constitutional: She is oriented to person, place, and time. She appears well-developed and well-nourished. No distress. HENT:   Head: Normocephalic and atraumatic. Right Ear: External ear normal.   Left Ear: External ear normal.   Nose: Nose normal.   Mouth/Throat: Oropharynx is clear and moist. No oropharyngeal exudate. Eyes: Pupils are equal, round, and reactive to light. Conjunctivae and EOM are normal. Right eye exhibits no discharge. Left eye exhibits no discharge. No scleral icterus.    Neck: Normal range of motion. Neck supple. No JVD present. No tracheal deviation present. Cardiovascular: Normal rate, regular rhythm and intact distal pulses. Pulmonary/Chest: Effort normal and breath sounds normal. No stridor. No respiratory distress. She has no wheezes. She exhibits no tenderness. Abdominal: Soft. Bowel sounds are normal. She exhibits no distension and no mass. There is no tenderness. Musculoskeletal: Normal range of motion. She exhibits no edema or tenderness. Neurological: She is alert and oriented to person, place, and time. No cranial nerve deficit. GCS eye subscore is 4. GCS verbal subscore is 5. GCS motor subscore is 6. Skin: Skin is warm and dry. No rash noted. She is not diaphoretic. No erythema. No pallor. Psychiatric: Thought content normal. Her mood appears anxious. Her speech is rapid and/or pressured. She is agitated and hyperactive. She expresses inappropriate judgment. Patient behaving in a way expected with the amount of narcotics she has on board   Nursing note and vitals reviewed. MDM  Number of Diagnoses or Management Options  Polysubstance abuse ([facility] [address]): established and worsening  Urinary tract infection without hematuria, site unspecified: established and worsening  Diagnosis management comments: o signs of sepsis patient is UTI was treated with antibiotics she is advised to stop using illicit drugs and methamphetamines.  Compliance is unlikely       Amount and/or Complexity of Data Reviewed  Clinical lab tests: ordered and reviewed  Tests in the radiology section of CPT®: ordered and reviewed  Tests in the medicine section of CPT®: ordered and reviewed    Risk of Complications, Morbidity, and/or Mortality  Presenting problems: moderate  Diagnostic procedures: moderate  Management options: moderate           Procedures

## 2019-07-03 NOTE — ED NOTES
Report to [facility], RN. Care transferred at this time.

## 2019-07-03 NOTE — ED NOTES
Patient is very difficult stick for blood and IV access. Medic able to get IV in left upper arm, but unable to obtain blood. This RN able to obtain blood from Left AC. Lab called to collect second set of blood cultures. Little Nunez in lab states she will send someone as soon as someone else comes in.

## 2019-07-08 LAB
BACTERIA SPEC CULT: NORMAL
BACTERIA SPEC CULT: NORMAL
SERVICE CMNT-IMP: NORMAL
SERVICE CMNT-IMP: NORMAL

## 2021-03-19 NOTE — PROGRESS NOTES
Hospitalist Progress Note Admit Date:  2019  4:59 PM  
Name:  Gopi Mcgovern Age:  33 y.o. 
:  1990 MRN:  [id number] PCP:  None Treatment Team: Attending Provider: Marivel Valladares MD; Consulting Provider: Jason Jameson MD; Care Manager: Omaira Garrison, RN; Utilization Review: Felicia Smith RN; Consulting Provider: Osmany Victoria MD; Consulting Provider: Brionna Arreola MD; Physical Therapy Assistant: Morgan Deal PTA 
 
HPI/Subjective:  
 
 
Ms. Fina Irvin is a 32 yo female with PMH of polysubstance use who is evaluated with malaise and bilateral hip pain on 19 and admitted with rhabdomyolysis, CIRA, RLE DVT, acute encephalopathy, UTI. She had UDS showing amphetamines, benzo, opiates  CT head negative. CXR negative. Rhabdomyolysis, likely was due to amphetamines, was managed with IVF without improvement and she has progressed to new dialysis start per nephrology. She has been seen by rheumatology/neurology, who recommends steroid taper for myositis. She continued to have LE pain, found to have right peroneal thrombus and has been managed with IV heparin to coumadin. She additionally had LLE pain/ decreased pulses with arterial duplex showing mild to moderate stenosis left femoral artery. She has been seen by vascular and no acute intervention needed. UTI treated with antibiotics, cx negative. BC NGTD. ECHO negative. CTAP showed pelvic fluid and possible PID- ID consulted. G/C negative. GYN evaluated due to vaginal bleeding and there are no acute needs. HIV and hepatitis are negative as well. Plans are for home at discharge once stable. 19 seems improved today, seen on dialysis, has diffuse pains and anxiety, diet tolerant, had BM, has gained weight and has more edema, ambulating if possible, still some vaginal bleeding, no dyspnea,  
 
 
 
Objective:  
 
Patient Vitals for the past 24 hrs: 
 Temp Pulse Resp BP SpO2 04/19/19 0958  98  (!) 155/103   
04/19/19 0930  (!) 103  (!) 132/98   
04/19/19 0900  (!) 102  125/77   
04/19/19 0833  (!) 102  139/84   
04/19/19 0720 98.1 °F (36.7 °C) (!) 110 17 131/87 98 % 04/19/19 0354 98 °F (36.7 °C) 95 17 131/85 96 % 04/18/19 2349 98.3 °F (36.8 °C) (!) 114 17 149/76 96 % 04/18/19 2011 97.9 °F (36.6 °C) (!) 106 19 (!) 132/93 98 % 04/18/19 1531 97.8 °F (36.6 °C) (!) 112 18 130/81 100 % 04/18/19 1118 97.1 °F (36.2 °C) 98 16 142/85 98 % Oxygen Therapy O2 Sat (%): 98 % (04/19/19 0720) Pulse via Oximetry: 127 beats per minute (04/06/19 2119) O2 Device: Room air (04/18/19 1502) Intake/Output Summary (Last 24 hours) at 4/19/2019 1023 Last data filed at 4/19/2019 9325 Gross per 24 hour Intake 480 ml Output 1600 ml Net -1120 ml  
   
*Note that automatically entered I/Os may not be accurate; dependent on patient compliance with collection and accurate  by techs. General:    Well nourished. alert, no distress CV:   RRR. No murmur, rub, or gallop. 2-3+ edema Lungs:   CTAB. No wheezing, rhonchi, or rales. Anterior exam 
Abdomen:   Soft, slightly distended, nontender Skin:     No rashes or jaundice. Neuro:  No gross focal deficits Data Review: 
I have reviewed all labs, meds, and studies from the last 24 hours: 
 
Recent Results (from the past 24 hour(s)) PROTHROMBIN TIME + INR Collection Time: 04/19/19  4:07 AM  
Result Value Ref Range Prothrombin time 21.5 (H) 11.7 - 14.5 sec INR 1.9    
CBC WITH AUTOMATED DIFF Collection Time: 04/19/19  4:07 AM  
Result Value Ref Range WBC 24.9 (H) 4.3 - 11.1 K/uL  
 RBC 2.50 (L) 4.05 - 5.2 M/uL HGB 7.7 (L) 11.7 - 15.4 g/dL HCT 23.4 (L) 35.8 - 46.3 % MCV 93.6 79.6 - 97.8 FL  
 MCH 30.8 26.1 - 32.9 PG  
 MCHC 32.9 31.4 - 35.0 g/dL  
 RDW 15.5 (H) 11.9 - 14.6 % PLATELET 874 380 - 407 K/uL MPV 10.0 9.4 - 12.3 FL ABSOLUTE NRBC 0.00 0.0 - 0.2 K/uL  
 DF AUTOMATED Detail Level: Detailed NEUTROPHILS 84 (H) 43 - 78 % LYMPHOCYTES 6 (L) 13 - 44 % MONOCYTES 6 4.0 - 12.0 % EOSINOPHILS 0 (L) 0.5 - 7.8 % BASOPHILS 0 0.0 - 2.0 % IMMATURE GRANULOCYTES 4 0.0 - 5.0 %  
 ABS. NEUTROPHILS 20.9 (H) 1.7 - 8.2 K/UL  
 ABS. LYMPHOCYTES 1.6 0.5 - 4.6 K/UL  
 ABS. MONOCYTES 1.5 (H) 0.1 - 1.3 K/UL  
 ABS. EOSINOPHILS 0.0 0.0 - 0.8 K/UL  
 ABS. BASOPHILS 0.0 0.0 - 0.2 K/UL  
 ABS. IMM. GRANS. 1.0 (H) 0.0 - 0.5 K/UL METABOLIC PANEL, COMPREHENSIVE Collection Time: 04/19/19  4:07 AM  
Result Value Ref Range Sodium 139 136 - 145 mmol/L Potassium 4.6 3.5 - 5.1 mmol/L Chloride 102 98 - 107 mmol/L  
 CO2 29 21 - 32 mmol/L Anion gap 8 7 - 16 mmol/L Glucose 168 (H) 65 - 100 mg/dL BUN 69 (H) 6 - 23 MG/DL Creatinine 5.23 (H) 0.6 - 1.0 MG/DL  
 GFR est AA 12 (L) >60 ml/min/1.73m2 GFR est non-AA 10 (L) >60 ml/min/1.73m2 Calcium 8.5 8.3 - 10.4 MG/DL Bilirubin, total 0.2 0.2 - 1.1 MG/DL  
 ALT (SGPT) 60 12 - 65 U/L  
 AST (SGOT) 25 15 - 37 U/L Alk. phosphatase 37 (L) 50 - 136 U/L Protein, total 5.2 (L) 6.3 - 8.2 g/dL Albumin 2.4 (L) 3.5 - 5.0 g/dL Globulin 2.8 2.3 - 3.5 g/dL A-G Ratio 0.9 (L) 1.2 - 3.5 All Micro Results Procedure Component Value Units Date/Time CULTURE, BLOOD [[id number]] Collected:  04/07/19 1630 Order Status:  Completed Specimen:  Blood Updated:  04/13/19 0379 Special Requests: --     
  LEFT 
ARM Culture result: NO GROWTH 5 DAYS     
 CULTURE, BLOOD [[id number]] Collected:  04/06/19 2032 Order Status:  Completed Specimen:  Blood Updated:  04/11/19 8244 Special Requests: --     
  LEFT 
HAND Culture result: NO GROWTH 5 DAYS     
 CULTURE, URINE [[id number]] Collected:  04/06/19 1857 Order Status:  Completed Specimen:  Cath Urine Updated:  04/09/19 2434 Special Requests: NO SPECIAL REQUESTS Culture result: NO GROWTH 2 DAYS     
 CULTURE, URINE [[id number]] Collected:  04/06/19 0640 Order Status:  Canceled Specimen:  Urine from Clean catch CULTURE, BLOOD [[id number]] Collected:  19 Order Status:  Canceled Specimen:  Blood Results for orders placed or performed during the hospital encounter of 19  
2D ECHO COMPLETE ADULT (TTE) W OR WO CONTR Narrative [place] [facility] [address] [place] [address] Dr Lucero, [address] [facility] 
[phone] Transthoracic Echocardiogram 
2D, M-mode, Doppler, and Color Doppler Patient: Brodie Juárez MR #: [id number] : 1990 Age: 33 years Gender: Female Study date: 2019 Account #: [de-identified] Height: 59 in 
Weight: 124.7 lb 
BSA: 1.51 mï¾² Status:Routine Location: 205 BP: 120/ 78 Allergies: PHENYTOIN SODIUM EXTENDED, LEVETIRACETAM 
 
Sonographer:  Senora Bamberger, [facility] Group:  [facility] Referring Physician:  Sean Morrison. Brice Steward MD 
Reading Physician:  Anna Gunn. [address] Jameson MD [facility] INDICATIONS: PVD. *Per patient- unable to turn onto left side due to blood clot in leg. * PROCEDURE: This was a routine study. A transthoracic echocardiogram was 
performed. The study included complete 2D imaging, M-mode, complete spectral 
Doppler, and color Doppler. Echocardiographic views were limited by  
restricted 
patient mobility and poor acoustic window availability. Image quality was 
adequate. LEFT VENTRICLE: Size was normal. Systolic function was normal. Ejection 
fraction was estimated in the range of 55 % to 60 %. There were no regional 
wall motion abnormalities. Wall thickness was normal. The E/e' ratio was 6.2. Left ventricular diastolic function parameters were normal. 
 
RIGHT VENTRICLE: The size was normal. Systolic function was normal. The 
tricuspid jet envelope definition was inadequate for estimation of RV  
systolic 
pressure.  
 
LEFT ATRIUM: Size was normal. 
 
RIGHT ATRIUM: Size was normal. 
 
 SYSTEMIC VEINS: IVC: The inferior vena cava was normal in size and course. AORTIC VALVE: The valve was structurally normal, tri-commissural. There was  
no 
evidence for stenosis. There was no insufficiency. MITRAL VALVE: Valve structure was normal. There was no evidence for stenosis. There was trivial regurgitation. TRICUSPID VALVE: The valve structure was normal. There was no evidence for 
stenosis. There was no regurgitation. PULMONIC VALVE: Not well visualized. There was no evidence for stenosis. There 
was trivial regurgitation. PERICARDIUM: There was no pericardial effusion. AORTA: The root exhibited normal size. SUMMARY: 
 
-  Left ventricle: Systolic function was normal. Ejection fraction was 
estimated in the range of 55 % to 60 %. There were no regional wall motion 
abnormalities. SYSTEM MEASUREMENT TABLES 
 
2D mode AoR Diam (2D): 2.5 cm 
LA Dimension (2D): 3 cm Left Atrium Systolic Volume Index; Method of Disks, Biplane; 2D mode;: 15.2 
ml/m2 IVS/LVPW (2D): 1.1 IVSd (2D): 1 cm LVIDd (2D): 3.8 cm LVIDs (2D): 2.5 cm 
LVOT Area (2D): 3.1 cm2 LVPWd (2D): 0.9 cm Tissue Doppler Imaging LV Peak Early Charles Tissue Win; Lateral MA (TDI): 17.2 cm/s LV Peak Early Charles Tissue Win; Medial MA (TDI): 8.2 cm/s Unspecified Scan Mode Peak Grad; Mean; Antegrade Flow: 8 mm[Hg] Vmax; Antegrade Flow: 134 cm/s LVOT Diam: 2 cm 
MV Peak Win/LV Peak Tissue Win E-Wave; Lateral MA: 4 MV Peak Win/LV Peak Tissue Win E-Wave; Medial MA: 8.4 Prepared and signed by 
 
Sona Bhagat MD [facility] Signed 08-Apr-2019 12:22:39 Current Meds: 
Current Facility-Administered Medications Medication Dose Route Frequency  warfarin (COUMADIN) tablet 6 mg  6 mg Oral QPM  
 methylPREDNISolone (PF) (SOLU-MEDROL) injection 10 mg  10 mg IntraVENous Q12H  ALPRAZolam (XANAX) tablet 1 mg  1 mg Oral BID  
 0.9% sodium chloride infusion 250 mL  250 mL IntraVENous PRN  
  morphine injection 2 mg  2 mg IntraVENous Q4H PRN  
 amLODIPine (NORVASC) tablet 10 mg  10 mg Oral DAILY  hydrALAZINE (APRESOLINE) 20 mg/mL injection 10 mg  10 mg IntraVENous Q6H PRN  
 diphenhydrAMINE (BENADRYL) capsule 25 mg  25 mg Oral DIALYSIS PRN  
 HYDROcodone-acetaminophen (NORCO)  mg tablet 1 Tab  1 Tab Oral Q6H PRN  
 diphenhydrAMINE (BENADRYL) capsule 25 mg  25 mg Oral Q6H PRN  thiamine HCL (B-1) tablet 100 mg  100 mg Oral DAILY  folic acid (FOLVITE) tablet 1 mg  1 mg Oral DAILY  sodium chloride (NS) flush 5-10 mL  5-10 mL IntraVENous PRN  
 ondansetron (ZOFRAN) injection 4 mg  4 mg IntraVENous Q6H PRN  
 nicotine (NICODERM CQ) 21 mg/24 hr patch 1 Patch  1 Patch TransDERmal DAILY  LORazepam (ATIVAN) injection 1 mg  1 mg IntraVENous Q6H PRN  
 naloxone (NARCAN) injection 0.4 mg  0.4 mg IntraVENous EVERY 2 MINUTES AS NEEDED Other Studies (last 24 hours): No results found. Assessment and Plan:  
 
Hospital Problems as of 4/19/2019 Date Reviewed: 4/7/2019 Codes Class Noted - Resolved POA Encephalopathy, toxic ICD-10-CM: G92 ICD-9-CM: 349.82  4/8/2019 - Present Unknown Toxic metabolic encephalopathy SQV-79-EX: K57 ICD-9-CM: 349.82  4/8/2019 - Present Unknown Rhabdomyolysis ICD-10-CM: P48.73 ICD-9-CM: 728.88  4/7/2019 - Present Yes * (Principal) Sepsis ([facility] [address] ICD-10-CM: A41.9 ICD-9-CM: 038.9, 995.91  4/6/2019 - Present Yes Hepatitis ICD-10-CM: K75.9 ICD-9-CM: 573.3  4/6/2019 - Present Yes Substance use disorder (Chronic) ICD-10-CM: F19.90 ICD-9-CM: 305.90  4/6/2019 - Present Yes Hip pain ICD-10-CM: M25.559 ICD-9-CM: 719.45  4/6/2019 - Present Yes Hyponatremia ICD-10-CM: E87.1 ICD-9-CM: 276.1  4/6/2019 - Present Yes CIRA (acute kidney injury) ([facility] [address] ICD-10-CM: N17.9 ICD-9-CM: 584.9  4/6/2019 - Present Yes  
   
 UTI (urinary tract infection) ICD-10-CM: N39.0 ICD-9-CM: 599.0  4/6/2019 - Present Yes Hypertension ICD-10-CM: I10 
ICD-9-CM: 401.9  4/6/2019 - Present Yes Asthma (Chronic) ICD-10-CM: J45.909 ICD-9-CM: 493.90  5/19/2016 - Present Yes Overview Signed 4/7/2019  7:19 AM by GARRET Hung Last Assessment & Plan:  
Albuterol prn Borderline personality disorder ([facility] [address] (Chronic) ICD-10-CM: F60.3 ICD-9-CM: 301.83  5/19/2016 - Present Yes Overview Signed 4/7/2019  7:19 AM by GARRET Hung Last Assessment & Plan:  
Continue Prozac, Seroquel, and trazodone Plan: · CIRA/ATN/ rhabdomyolysis:  Dialysis per nephrology, on steroid taper for · Myositis: continue steroid taper · Polysubstance use: needs cessation, continued folate and thiamine, nicotine patch · UTI: treated · RLE DVT: on coumadin, pharmacy dosing, followup daily INR · Leukocytosis:  Partially steroid contribution, further infectious workup negative, followup CBC/ repeat CXR/UA 
· HTN: started norvasc, prn hydralazine · Toxic encephalopathy: resolved · Anemia: CBC currently stable · Anxiety: continued xanax DC planning/Dispo:  pending Diet:  DIET RENAL 
DVT ppx:  Coumadin, INR 1.9 Signed: Ronny Ba MD

## 2022-03-18 PROBLEM — A41.9 SEPSIS (HCC): Status: ACTIVE | Noted: 2019-04-06

## 2022-03-19 PROBLEM — K59.00 CONSTIPATION: Status: ACTIVE | Noted: 2019-04-07

## 2022-03-19 PROBLEM — G92.9 ENCEPHALOPATHY, TOXIC: Status: ACTIVE | Noted: 2019-04-08

## 2022-03-19 PROBLEM — I10 HYPERTENSION: Status: ACTIVE | Noted: 2019-04-06

## 2022-03-19 PROBLEM — N17.9 AKI (ACUTE KIDNEY INJURY) (HCC): Status: ACTIVE | Noted: 2019-04-06

## 2022-03-19 PROBLEM — F19.90 SUBSTANCE USE DISORDER: Status: ACTIVE | Noted: 2019-04-06

## 2022-03-19 PROBLEM — E87.1 HYPONATREMIA: Status: ACTIVE | Noted: 2019-04-06

## 2022-03-19 PROBLEM — G92.8 TOXIC METABOLIC ENCEPHALOPATHY: Status: ACTIVE | Noted: 2019-04-08

## 2022-03-19 PROBLEM — N39.0 UTI (URINARY TRACT INFECTION): Status: ACTIVE | Noted: 2019-04-06

## 2022-03-19 PROBLEM — M62.82 RHABDOMYOLYSIS: Status: ACTIVE | Noted: 2019-04-07

## 2022-03-20 PROBLEM — M25.559 HIP PAIN: Status: ACTIVE | Noted: 2019-04-06

## 2022-03-20 PROBLEM — K75.9 HEPATITIS: Status: ACTIVE | Noted: 2019-04-06

## 2024-04-03 NOTE — PROGRESS NOTES
Assumed care of patient at change of shift sitting up in chair. AO x4. NSR on tele monitor. O2 sat adequate on RA. Denies chest pain and shortness of breath. O2 walk completed. Plan of care updated. Bed locked, in lowest position. Call light and personal items in reach. All needs met.    Problem: RESPIRATORY - ADULT  Goal: Achieves optimal ventilation and oxygenation  Description: INTERVENTIONS:  - Assess for changes in respiratory status  - Assess for changes in mentation and behavior  - Position to facilitate oxygenation and minimize respiratory effort  - Oxygen supplementation based on oxygen saturation or ABGs  - Provide Smoking Cessation handout, if applicable  - Encourage broncho-pulmonary hygiene including cough, deep breathe, Incentive Spirometry  - Assess the need for suctioning and perform as needed  - Assess and instruct to report SOB or any respiratory difficulty  - Respiratory Therapy support as indicated  - Manage/alleviate anxiety  - Monitor for signs/symptoms of CO2 retention  Outcome: Progressing     Problem: SAFETY ADULT - FALL  Goal: Free from fall injury  Description: INTERVENTIONS:  - Assess pt frequently for physical needs  - Identify cognitive and physical deficits and behaviors that affect risk of falls.  - [place] fall precautions as indicated by assessment.  - Educate pt/family on patient safety including physical limitations  - Instruct pt to call for assistance with activity based on assessment  - Modify environment to reduce risk of injury  - Provide assistive devices as appropriate  - Consider OT/PT consult to assist with strengthening/mobility  - Encourage toileting schedule  Outcome: Progressing     Problem: CARDIOVASCULAR - ADULT  Goal: Maintains optimal cardiac output and hemodynamic stability  Description: INTERVENTIONS:  - Monitor vital signs, rhythm, and trends  - Monitor for bleeding, hypotension and signs of decreased cardiac output  - Evaluate effectiveness of vasoactive  END OF SHIFT NOTE: 
 
INTAKE/OUTPUT 
04/06 0701 - 04/07 0700 In: -  
Out: 500 [Urine:500] Voiding: NO 
Catheter: NO 
Drain:   
 
 
 
 
 
Flatus: Patient does have flatus present. Stool:  0 occurrences. Characteristics: 
  
Emesis: 0 occurrences. Characteristics: VITAL SIGNS Patient Vitals for the past 12 hrs: 
 Temp Pulse Resp BP SpO2  
04/07/19 0340 97.5 °F (36.4 °C) (!) 125 18 125/86 100 % 04/06/19 2300 97.5 °F (36.4 °C) (!) 131 18 110/85 100 % 04/06/19 2119 97.8 °F (36.6 °C)   (!) 126/98 98 % 04/06/19 2058    136/83 99 % Pain Assessment Pain Intensity 1: 10 (04/07/19 0250) Pain Location 1: Leg 
Pain Intervention(s) 1: Medication (see MAR) Patient Stated Pain Goal: 0 Ambulating Yes Shift report given to oncoming nurse at the bedside.  
 
Norbert Tomlinson RN 
 
 medications to optimize hemodynamic stability  - Monitor arterial and/or venous puncture sites for bleeding and/or hematoma  - Assess quality of pulses, skin color and temperature  - Assess for signs of decreased coronary artery perfusion - ex. Angina  - Evaluate fluid balance, assess for edema, trend weights  Outcome: Progressing  Goal: Absence of cardiac arrhythmias or at baseline  Description: INTERVENTIONS:  - Continuous cardiac monitoring, monitor vital signs, obtain 12 lead EKG if indicated  - Evaluate effectiveness of antiarrhythmic and heart rate control medications as ordered  - Initiate emergency measures for life threatening arrhythmias  - Monitor electrolytes and administer replacement therapy as ordered  Outcome: Progressing     Problem: Patient/Family Goals  Goal: Patient/Family Long Term Goal  Description: Patient's Long Term Goal: \"Stay out of the hospital\"     Interventions:  - Take medications as prescribed  - Attend follow-up appointments  - Increase activity as tolerated   - See additional Care Plan goals for specific interventions  Outcome: Progressing  Goal: Patient/Family Short Term Goal  Description: Patient's Short Term Goal: \"Go home\"     Interventions:   - Wean oxygen as tolerated  - Oxygen walk   - Incentive spirometry and flutter valve   - Tamiflu BID   - IV abx  - Nebs   - See additional Care Plan goals for specific interventions  Outcome: Progressing

## 2024-04-19 NOTE — CONSULTS
GYN CONSULT - patient's chart history reviewed and reviewed with patient as well, and brief exam done:  Called to see this 33 yr old WF  for vaginal bleeding, who has had 2 C sections and a tubal ligation with second delivery 8 years ago. Reports she had a Mirena IUD prior to second pregnancy that resulted in PID. The Mirena was pulled and she immediately got pregnant. Her menses have been very unpredictable since the tubal. She may go 2-3 months w/o one, then bleed 7-10 days. We briefly discussed PCOS. She has now been hospitalized for 9 days with rhabdomyolysis, perhaps from substance  abuse. She is getting scheduled dialysis. She states she is now having vaginal bleeding since being anticoagulated, getting heparin and coumadin. Hgb levels have dropped from 8.3 two days ago, 8.0 yesterday and now 7.0 this morning. She has had a pelvic U/S which showed no abnormal findings 1 weeks ago. So no need for a D and C to remove any polyps of fibroids. Do not recommend any hormonal therapy at this time, but if absolutely necessary would use progesterone to stabilize endometrial lining. It looks like she is very anticoagulated and it has been recommended that her levels drop a little, this may help reduce the bleeding some. For now would agree with transfusion as needed to maintain Hgb 9-10 grams. No GYN intervention at present. Discussed long term with her of possible options of endometrial ablation or even hysterectomy as she has completed her childbearing. Patient counseled face to face for 30 minutes regarding her complaints, differential diagnosis and disposition with greater than 50% of the time spent in this way. No

## 2024-12-22 ENCOUNTER — APPOINTMENT (OUTPATIENT)
Dept: GENERAL RADIOLOGY | Age: 34
End: 2024-12-22

## 2024-12-22 ENCOUNTER — HOSPITAL ENCOUNTER (EMERGENCY)
Age: 34
Discharge: HOME | End: 2024-12-22

## 2024-12-22 VITALS
DIASTOLIC BLOOD PRESSURE: 82 MMHG | RESPIRATION RATE: 16 BRPM | BODY MASS INDEX: 27.21 KG/M2 | SYSTOLIC BLOOD PRESSURE: 134 MMHG | HEART RATE: 92 BPM | WEIGHT: 135 LBS | HEIGHT: 59 IN | OXYGEN SATURATION: 100 % | TEMPERATURE: 98 F

## 2024-12-22 DIAGNOSIS — R21 RASH AND OTHER NONSPECIFIC SKIN ERUPTION: Primary | ICD-10-CM

## 2024-12-22 DIAGNOSIS — B34.8 RHINOVIRUS: ICD-10-CM

## 2024-12-22 LAB
ALBUMIN SERPL-MCNC: 3.7 G/DL (ref 3.5–5)
ALBUMIN/GLOB SERPL: 1.1 (ref 1–1.9)
ALP SERPL-CCNC: 48 U/L (ref 35–104)
ALT SERPL-CCNC: 14 U/L (ref 8–45)
ANION GAP SERPL CALC-SCNC: 13 MMOL/L (ref 7–16)
AST SERPL-CCNC: 23 U/L (ref 15–37)
B PERT DNA SPEC QL NAA+PROBE: NOT DETECTED
BACTERIA URNS QL MICRO: 0 /HPF
BASOPHILS # BLD: 0.1 K/UL (ref 0–0.2)
BASOPHILS NFR BLD: 1 % (ref 0–2)
BILIRUB SERPL-MCNC: 0.4 MG/DL (ref 0–1.2)
BILIRUB UR QL: ABNORMAL
BORDETELLA PARAPERTUSSIS BY PCR: NOT DETECTED
BUN SERPL-MCNC: 15 MG/DL (ref 6–23)
C PNEUM DNA SPEC QL NAA+PROBE: NOT DETECTED
CALCIUM SERPL-MCNC: 9.1 MG/DL (ref 8.8–10.2)
CASTS URNS QL MICRO: 0 /LPF
CHLORIDE SERPL-SCNC: 101 MMOL/L (ref 98–107)
CO2 SERPL-SCNC: 24 MMOL/L (ref 20–29)
CREAT SERPL-MCNC: 0.95 MG/DL (ref 0.6–1.1)
CRYSTALS URNS QL MICRO: NORMAL /LPF
D DIMER PPP FEU-MCNC: 0.28 UG/ML(FEU)
DIFFERENTIAL METHOD BLD: ABNORMAL
EOSINOPHIL # BLD: 0.9 K/UL (ref 0–0.8)
EOSINOPHIL NFR BLD: 9 % (ref 0.5–7.8)
EPI CELLS #/AREA URNS HPF: NORMAL /HPF
ERYTHROCYTE [DISTWIDTH] IN BLOOD BY AUTOMATED COUNT: 14.2 % (ref 11.9–14.6)
FLUAV SUBTYP SPEC NAA+PROBE: NOT DETECTED
FLUBV RNA SPEC QL NAA+PROBE: NOT DETECTED
GLOBULIN SER CALC-MCNC: 3.4 G/DL (ref 2.3–3.5)
GLUCOSE SERPL-MCNC: 141 MG/DL (ref 70–99)
GLUCOSE UR QL STRIP.AUTO: NEGATIVE MG/DL
HADV DNA SPEC QL NAA+PROBE: NOT DETECTED
HCG UR QL: NEGATIVE
HCOV 229E RNA SPEC QL NAA+PROBE: NOT DETECTED
HCOV HKU1 RNA SPEC QL NAA+PROBE: NOT DETECTED
HCOV NL63 RNA SPEC QL NAA+PROBE: NOT DETECTED
HCOV OC43 RNA SPEC QL NAA+PROBE: NOT DETECTED
HCT VFR BLD AUTO: 39.7 % (ref 35.8–46.3)
HGB BLD-MCNC: 13.4 G/DL (ref 11.7–15.4)
HMPV RNA SPEC QL NAA+PROBE: NOT DETECTED
HPIV1 RNA SPEC QL NAA+PROBE: NOT DETECTED
HPIV2 RNA SPEC QL NAA+PROBE: NOT DETECTED
HPIV3 RNA SPEC QL NAA+PROBE: NOT DETECTED
HPIV4 RNA SPEC QL NAA+PROBE: NOT DETECTED
IMM GRANULOCYTES # BLD AUTO: 0 K/UL (ref 0–0.5)
IMM GRANULOCYTES NFR BLD AUTO: 0 % (ref 0–5)
KETONES UR-MCNC: NEGATIVE MG/DL
LACTATE SERPL-SCNC: 1.9 MMOL/L (ref 0.5–2)
LEUKOCYTE ESTERASE UR QL STRIP: NEGATIVE
LYMPHOCYTES # BLD: 2.4 K/UL (ref 0.5–4.6)
LYMPHOCYTES NFR BLD: 24 % (ref 13–44)
M PNEUMO DNA SPEC QL NAA+PROBE: NOT DETECTED
MAGNESIUM SERPL-MCNC: 2.2 MG/DL (ref 1.8–2.4)
MCH RBC QN AUTO: 28.6 PG (ref 26.1–32.9)
MCHC RBC AUTO-ENTMCNC: 33.8 G/DL (ref 31.4–35)
MCV RBC AUTO: 84.6 FL (ref 82–102)
MONOCYTES # BLD: 0.7 K/UL (ref 0.1–1.3)
MONOCYTES NFR BLD: 7 % (ref 4–12)
MUCOUS THREADS URNS QL MICRO: NORMAL /LPF
NEUTS SEG # BLD: 6.2 K/UL (ref 1.7–8.2)
NEUTS SEG NFR BLD: 59 % (ref 43–78)
NITRITE UR QL: NEGATIVE
NRBC # BLD: 0 K/UL (ref 0–0.2)
OTHER OBSERVATIONS: NORMAL
PH UR: 5.5 (ref 5–9)
PLATELET # BLD AUTO: 241 K/UL (ref 150–450)
PMV BLD AUTO: 10.2 FL (ref 9.4–12.3)
POTASSIUM SERPL-SCNC: 3.6 MMOL/L (ref 3.5–5.1)
PROCALCITONIN SERPL-MCNC: <0.02 NG/ML (ref 0–0.1)
PROT SERPL-MCNC: 7.1 G/DL (ref 6.3–8.2)
PROT UR QL: 30 MG/DL
RBC # BLD AUTO: 4.69 M/UL (ref 4.05–5.2)
RBC # UR STRIP: NEGATIVE
RBC #/AREA URNS HPF: NORMAL /HPF
RSV RNA SPEC QL NAA+PROBE: NOT DETECTED
RV+EV RNA SPEC QL NAA+PROBE: DETECTED
SARS-COV-2 RNA RESP QL NAA+PROBE: NOT DETECTED
SERVICE CMNT-IMP: ABNORMAL
SODIUM SERPL-SCNC: 137 MMOL/L (ref 136–145)
SP GR UR: >1.03 (ref 1–1.02)
STREP, MOLECULAR: NOT DETECTED
UROBILINOGEN UR QL: 0.2 EU/DL (ref 0.2–1)
WBC # BLD AUTO: 10.2 K/UL (ref 4.3–11.1)
WBC URNS QL MICRO: NORMAL /HPF

## 2024-12-22 PROCEDURE — 0202U NFCT DS 22 TRGT SARS-COV-2: CPT

## 2024-12-22 PROCEDURE — 71046 X-RAY EXAM CHEST 2 VIEWS: CPT

## 2024-12-22 PROCEDURE — 94760 N-INVAS EAR/PLS OXIMETRY 1: CPT

## 2024-12-22 PROCEDURE — 80053 COMPREHEN METABOLIC PANEL: CPT

## 2024-12-22 PROCEDURE — 6370000000 HC RX 637 (ALT 250 FOR IP): Performed by: PHYSICIAN ASSISTANT

## 2024-12-22 PROCEDURE — 99284 EMERGENCY DEPT VISIT MOD MDM: CPT

## 2024-12-22 PROCEDURE — 81025 URINE PREGNANCY TEST: CPT

## 2024-12-22 PROCEDURE — 84145 PROCALCITONIN (PCT): CPT

## 2024-12-22 PROCEDURE — 87651 STREP A DNA AMP PROBE: CPT

## 2024-12-22 PROCEDURE — 85379 FIBRIN DEGRADATION QUANT: CPT

## 2024-12-22 PROCEDURE — 83735 ASSAY OF MAGNESIUM: CPT

## 2024-12-22 PROCEDURE — 6360000002 HC RX W HCPCS: Performed by: PHYSICIAN ASSISTANT

## 2024-12-22 PROCEDURE — 81001 URINALYSIS AUTO W/SCOPE: CPT

## 2024-12-22 PROCEDURE — 94640 AIRWAY INHALATION TREATMENT: CPT

## 2024-12-22 PROCEDURE — 85025 COMPLETE CBC W/AUTO DIFF WBC: CPT

## 2024-12-22 PROCEDURE — 96374 THER/PROPH/DIAG INJ IV PUSH: CPT

## 2024-12-22 PROCEDURE — 83605 ASSAY OF LACTIC ACID: CPT

## 2024-12-22 RX ORDER — TRIAMCINOLONE ACETONIDE 1 MG/G
CREAM TOPICAL
Qty: 453.6 G | Refills: 0 | Status: SHIPPED | OUTPATIENT
Start: 2024-12-22

## 2024-12-22 RX ORDER — DEXAMETHASONE SODIUM PHOSPHATE 10 MG/ML
10 INJECTION INTRAMUSCULAR; INTRAVENOUS ONCE
Status: COMPLETED | OUTPATIENT
Start: 2024-12-22 | End: 2024-12-22

## 2024-12-22 RX ORDER — HYDROXYZINE HYDROCHLORIDE 25 MG/1
25 TABLET, FILM COATED ORAL EVERY 8 HOURS PRN
Qty: 30 TABLET | Refills: 0 | Status: SHIPPED | OUTPATIENT
Start: 2024-12-22 | End: 2025-01-01

## 2024-12-22 RX ORDER — IPRATROPIUM BROMIDE AND ALBUTEROL SULFATE 2.5; .5 MG/3ML; MG/3ML
1 SOLUTION RESPIRATORY (INHALATION)
Status: COMPLETED | OUTPATIENT
Start: 2024-12-22 | End: 2024-12-22

## 2024-12-22 RX ORDER — HYDROXYZINE PAMOATE 25 MG/1
25 CAPSULE ORAL
Status: COMPLETED | OUTPATIENT
Start: 2024-12-22 | End: 2024-12-22

## 2024-12-22 RX ORDER — PREDNISONE 10 MG/1
TABLET ORAL
Qty: 45 TABLET | Refills: 0 | Status: SHIPPED | OUTPATIENT
Start: 2024-12-22 | End: 2025-01-05

## 2024-12-22 RX ADMIN — IPRATROPIUM BROMIDE AND ALBUTEROL SULFATE 1 DOSE: .5; 3 SOLUTION RESPIRATORY (INHALATION) at 18:42

## 2024-12-22 RX ADMIN — HYDROXYZINE PAMOATE 25 MG: 25 CAPSULE ORAL at 19:23

## 2024-12-22 RX ADMIN — DEXAMETHASONE SODIUM PHOSPHATE 10 MG: 10 INJECTION INTRAMUSCULAR; INTRAVENOUS at 19:23

## 2024-12-22 ASSESSMENT — PAIN SCALES - GENERAL: PAINLEVEL_OUTOF10: 7

## 2024-12-22 ASSESSMENT — LIFESTYLE VARIABLES
HOW MANY STANDARD DRINKS CONTAINING ALCOHOL DO YOU HAVE ON A TYPICAL DAY: 5 OR 6
HOW OFTEN DO YOU HAVE A DRINK CONTAINING ALCOHOL: 2-4 TIMES A MONTH

## 2024-12-22 ASSESSMENT — PAIN DESCRIPTION - LOCATION: LOCATION: GENERALIZED

## 2024-12-22 ASSESSMENT — PAIN - FUNCTIONAL ASSESSMENT: PAIN_FUNCTIONAL_ASSESSMENT: 0-10

## 2024-12-22 NOTE — ED TRIAGE NOTES
Pt arrives seated on wheelchair with c/o \"rash all over my body\", imbalance, subjective fever, chills,  and intermittent exertional shortness of breath; onset four days.

## 2024-12-22 NOTE — ED PROVIDER NOTES
Emergency Department Provider Note       PCP: No primary care provider on file.   Age: 34 y.o.   Sex: female     DISPOSITION Decision To Discharge 12/22/2024 08:14:55 PM   DISPOSITION CONDITION Stable            ICD-10-CM    1. Rash and other nonspecific skin eruption  R21       2. Rhinovirus  B34.8           Medical Decision Making     Patient's rhinovirus is positive.  This more than likely is causing the tachycardia.  She is dehydrated as well.  No infection in the urine.  The rest of her blood work was reassuring.  She has nonspecific dermatitis with the itching.  I did give a dose of IV Decadron here and hydroxyzine.  She was encouraged to moisturize her skin twice daily with good moisturizing lotion and I have written a prescription for Kenalog 0.1% ointment to apply to the areas for itching if needed along with a tapering dose of prednisone and a prescription for hydroxyzine.  Patient has a history of substance abuse.  It is unclear whether it could be coming from that.  White count was reassuring.  Patient looks well other than rhinovirus symptoms.  She was encouraged to drink plenty of fluids and rest, return to the ED if worsening in any way and otherwise follow-up with her primary care physician for recheck.  She is stable for discharge and ambulatory out of the ED without difficulty at this time.     1 or more acute illnesses that pose a threat to life or bodily function.   Over the counter drug management performed.  Prescription drug management performed.  Shared medical decision making was utilized in creating the patients health plan today.  Considerations: The following items were considered but not ordered: Further evaluation.    I independently ordered and reviewed each unique test.  I reviewed external records: ED visit note from a different ED.    The patients assessment required an independent historian: None.  The reason they were needed is .                History     Patient is here with

## 2024-12-23 NOTE — DISCHARGE INSTRUCTIONS
Avoid itching or scratching.  Take the prednisone as directed, you did receive a dose of IV steroids here, start the prednisone in the morning.  Use the steroid cream to the areas 3 times daily to help with the itching.  I did prescribe a pill for the itching.  Make sure you are drinking plenty of water and use moisturizing lotion twice daily such as [place].  Your rhinovirus was positive which is a viral URI/common cold.  It can make you have congestion and bodyaches.  The chest x-ray did not show any acute abnormality.  You were dehydrated.  The urine was sent for microscopic analysis and if it shows you need antibiotics, we will call you and let you know, the strep was negative.  The blood work was all reassuring.